# Patient Record
Sex: FEMALE | Race: WHITE | NOT HISPANIC OR LATINO | Employment: FULL TIME | RURAL
[De-identification: names, ages, dates, MRNs, and addresses within clinical notes are randomized per-mention and may not be internally consistent; named-entity substitution may affect disease eponyms.]

---

## 2018-09-10 ENCOUNTER — CONVERSION ENCOUNTER (OUTPATIENT)
Dept: FAMILY MEDICINE CLINIC | Facility: CLINIC | Age: 63
End: 2018-09-10

## 2018-09-11 LAB
ALBUMIN SERPL-MCNC: 4 G/DL (ref 3.6–5.1)
ALP SERPL-CCNC: 137 U/L (ref 33–130)
ALT SERPL-CCNC: 26 U/L (ref 6–29)
AST SERPL-CCNC: 21 U/L (ref 10–35)
BASOPHILS # BLD AUTO: 39 CELLS/UL (ref 0–200)
BASOPHILS NFR BLD AUTO: 0.7 %
BILIRUB SERPL-MCNC: 0.7 MG/DL (ref 0.2–1.2)
BUN SERPL-MCNC: 21 MG/DL (ref 7–25)
BUN/CREAT SERPL: ABNORMAL (CALC) (ref 6–22)
CALCIUM SERPL-MCNC: 10.3 MG/DL (ref 8.6–10.4)
CHLORIDE SERPL-SCNC: 106 MMOL/L (ref 98–110)
CHOLEST SERPL-MCNC: 159 MG/DL
CHOLEST/HDLC SERPL: 3.8 (CALC)
CONV CO2: 25 MMOL/L (ref 20–32)
CONV TOTAL PROTEIN: 6.7 G/DL (ref 6.1–8.1)
CREAT UR-MCNC: 0.92 MG/DL (ref 0.5–0.99)
EOSINOPHIL # BLD AUTO: 151 CELLS/UL (ref 15–500)
EOSINOPHIL # BLD AUTO: 2.7 %
ERYTHROCYTE [DISTWIDTH] IN BLOOD BY AUTOMATED COUNT: 14.4 % (ref 11–15)
GLOBULIN UR ELPH-MCNC: 2.7 MG/DL (ref 1.9–3.7)
GLUCOSE UR QL: 134 MG/DL (ref 65–99)
HCT VFR BLD AUTO: 42.7 % (ref 35–45)
HDLC SERPL-MCNC: 42 MG/DL
HGB BLD-MCNC: 14 G/DL (ref 11.7–15.5)
INSULIN SERPL-ACNC: 1.5 (CALC) (ref 1–2.5)
LDLC SERPL CALC-MCNC: 90 MG/DL
LYMPHOCYTES # BLD AUTO: 1462 CELLS/UL (ref 850–3900)
LYMPHOCYTES NFR BLD AUTO: 26.1 %
MCH RBC QN AUTO: 28.1 PG (ref 27–33)
MCHC RBC AUTO-ENTMCNC: 32.8 G/DL (ref 32–36)
MCV RBC AUTO: 85.6 FL (ref 80–100)
MONOCYTES # BLD AUTO: 381 CELLS/UL (ref 200–950)
MONOCYTES NFR BLD AUTO: 6.8 %
NEUTROPHILS # BLD AUTO: 3567 CELLS/UL (ref 1500–7800)
NEUTROPHILS NFR BLD AUTO: 63.7 %
NONHDLC SERPL-MCNC: 117 MG/DL
PLATELET # BLD AUTO: 204 10*3/UL (ref 140–400)
PMV BLD AUTO: 10.2 FL (ref 7.5–12.5)
POTASSIUM SERPL-SCNC: 4.2 MMOL/L (ref 3.5–5.3)
RBC # BLD AUTO: 4.99 MILLION/UL (ref 3.8–5.1)
SODIUM SERPL-SCNC: 138 MMOL/L (ref 135–146)
TRIGL SERPL-MCNC: 161 MG/DL
TSH SERPL-ACNC: 2.07 MIU/L (ref 0.4–4.5)
WBC # BLD AUTO: 5.6 10*3/UL (ref 3.8–10.8)

## 2019-04-06 ENCOUNTER — CONVERSION ENCOUNTER (OUTPATIENT)
Dept: FAMILY MEDICINE CLINIC | Facility: CLINIC | Age: 64
End: 2019-04-06

## 2019-04-07 LAB
ALBUMIN SERPL-MCNC: 4 G/DL (ref 3.6–5.1)
ALP SERPL-CCNC: 161 U/L (ref 33–130)
ALT SERPL-CCNC: 30 U/L (ref 6–29)
AST SERPL-CCNC: 19 U/L (ref 10–35)
BILIRUB SERPL-MCNC: 0.6 MG/DL (ref 0.2–1.2)
BUN SERPL-MCNC: 21 MG/DL (ref 7–25)
BUN/CREAT SERPL: ABNORMAL (CALC) (ref 6–22)
CALCIUM SERPL-MCNC: 10.2 MG/DL (ref 8.6–10.4)
CHLORIDE SERPL-SCNC: 106 MMOL/L (ref 98–110)
CHOLEST SERPL-MCNC: 146 MG/DL
CHOLEST/HDLC SERPL: 3.7 (CALC)
CONV CO2: 26 MMOL/L (ref 20–32)
CONV TOTAL PROTEIN: 6.4 G/DL (ref 6.1–8.1)
CREAT UR-MCNC: 0.94 MG/DL (ref 0.5–0.99)
GLOBULIN UR ELPH-MCNC: 2.4 MG/DL (ref 1.9–3.7)
GLUCOSE UR QL: 122 MG/DL (ref 65–99)
HBA1C MFR BLD: 5.6 %
HDLC SERPL-MCNC: 39 MG/DL
INSULIN SERPL-ACNC: 1.7 (CALC) (ref 1–2.5)
LDLC SERPL CALC-MCNC: 82 MG/DL
NONHDLC SERPL-MCNC: 107 MG/DL
POTASSIUM SERPL-SCNC: 4.5 MMOL/L (ref 3.5–5.3)
SODIUM SERPL-SCNC: 139 MMOL/L (ref 135–146)
TRIGL SERPL-MCNC: 152 MG/DL

## 2019-06-17 ENCOUNTER — OFFICE VISIT (OUTPATIENT)
Dept: FAMILY MEDICINE CLINIC | Facility: CLINIC | Age: 64
End: 2019-06-17

## 2019-06-17 VITALS
DIASTOLIC BLOOD PRESSURE: 89 MMHG | OXYGEN SATURATION: 99 % | HEART RATE: 69 BPM | WEIGHT: 247 LBS | RESPIRATION RATE: 18 BRPM | HEIGHT: 64 IN | TEMPERATURE: 98.7 F | SYSTOLIC BLOOD PRESSURE: 136 MMHG | BODY MASS INDEX: 42.17 KG/M2

## 2019-06-17 DIAGNOSIS — M54.50 ACUTE LEFT-SIDED LOW BACK PAIN WITHOUT SCIATICA: Primary | ICD-10-CM

## 2019-06-17 DIAGNOSIS — I10 ESSENTIAL HYPERTENSION: ICD-10-CM

## 2019-06-17 DIAGNOSIS — E66.01 MORBID OBESITY (HCC): ICD-10-CM

## 2019-06-17 PROCEDURE — 99213 OFFICE O/P EST LOW 20 MIN: CPT | Performed by: FAMILY MEDICINE

## 2019-06-17 PROCEDURE — 96372 THER/PROPH/DIAG INJ SC/IM: CPT | Performed by: FAMILY MEDICINE

## 2019-06-17 RX ORDER — KETOROLAC TROMETHAMINE 30 MG/ML
60 INJECTION, SOLUTION INTRAMUSCULAR; INTRAVENOUS ONCE
Status: COMPLETED | OUTPATIENT
Start: 2019-06-17 | End: 2019-06-17

## 2019-06-17 RX ORDER — POTASSIUM CHLORIDE 1.5 G/1.77G
POWDER, FOR SOLUTION ORAL
COMMUNITY
Start: 2013-04-19 | End: 2019-10-18

## 2019-06-17 RX ORDER — METHOCARBAMOL 500 MG/1
500 TABLET, FILM COATED ORAL 4 TIMES DAILY PRN
Qty: 30 TABLET | Refills: 2 | Status: SHIPPED | OUTPATIENT
Start: 2019-06-17 | End: 2019-10-18

## 2019-06-17 RX ORDER — PREDNISONE 20 MG/1
20 TABLET ORAL 2 TIMES DAILY
Qty: 10 TABLET | Refills: 0 | Status: SHIPPED | OUTPATIENT
Start: 2019-06-17 | End: 2019-06-22

## 2019-06-17 RX ORDER — RAMIPRIL 10 MG/1
CAPSULE ORAL
COMMUNITY
Start: 2013-04-19 | End: 2019-11-19 | Stop reason: SDUPTHER

## 2019-06-17 RX ORDER — MONTELUKAST SODIUM 10 MG/1
1 TABLET ORAL DAILY
COMMUNITY
Start: 2013-04-19 | End: 2019-11-19 | Stop reason: SDUPTHER

## 2019-06-17 RX ORDER — FUROSEMIDE 20 MG/1
TABLET ORAL
COMMUNITY
Start: 2013-04-19 | End: 2019-11-22

## 2019-06-17 RX ADMIN — KETOROLAC TROMETHAMINE 60 MG: 30 INJECTION, SOLUTION INTRAMUSCULAR; INTRAVENOUS at 14:40

## 2019-06-17 NOTE — PROGRESS NOTES
"Subjective   Brianna Caldera is a 63 y.o. female.     Back Pain   This is a new problem. The current episode started in the past 7 days. The problem occurs constantly. The problem is unchanged. The pain is present in the lumbar spine. The quality of the pain is described as shooting. The pain radiates to the left thigh. The pain is at a severity of 6/10. The pain is moderate. The symptoms are aggravated by sitting, bending, standing and position. Pertinent negatives include no abdominal pain, fever, numbness, pelvic pain, perianal numbness, tingling or weakness. She has tried NSAIDs and heat (alternating Aleve and Tylenol) for the symptoms. The treatment provided mild (temporary relief) relief.   May have started after lifting her 42 lb granddaughter.    The following portions of the patient's history were reviewed and updated as appropriate: allergies, current medications, past family history, past medical history, past social history, past surgical history and problem list.    Review of Systems   Constitutional: Positive for activity change. Negative for fever.   Gastrointestinal: Negative for abdominal pain and constipation.   Genitourinary: Negative.  Negative for decreased urine volume and pelvic pain.   Musculoskeletal: Positive for back pain and gait problem.   Neurological: Negative for dizziness, tingling, weakness and numbness.       Objective    Vitals:    06/17/19 1334   BP: 136/89   BP Location: Left arm   Patient Position: Sitting   Cuff Size: Large Adult   Pulse: 69   Resp: 18   Temp: 98.7 °F (37.1 °C)   TempSrc: Oral   SpO2: 99%   Weight: 112 kg (247 lb)   Height: 162.6 cm (64\")     Physical Exam   HENT:   Head: Normocephalic and atraumatic.   Mouth/Throat: Oropharynx is clear and moist.   Eyes: Conjunctivae and EOM are normal. Pupils are equal, round, and reactive to light.   Neck: Normal range of motion. Neck supple. No edema present.   Cardiovascular: Normal rate, regular rhythm and normal heart " sounds.   Pulmonary/Chest: Effort normal and breath sounds normal.   Abdominal: Soft. Bowel sounds are normal.   Musculoskeletal:        Lumbar back: She exhibits tenderness and spasm. She exhibits no swelling, no edema and no deformity.        Back:    Neurological: She is alert. She has normal strength. No cranial nerve deficit.   Reflex Scores:       Patellar reflexes are 0 on the right side and 0 on the left side.  Bilateral LE strength equal   Skin: No rash noted.   Psychiatric: She has a normal mood and affect. Thought content normal.         Assessment/Plan   Brianna was seen today for back pain.    Diagnoses and all orders for this visit:    Acute left-sided low back pain without sciatica  Comments:  Likely lumbar strain.  Treatment as below.  Warning signs discussed.  F/U in 5-7 days if not improved.  Orders:  -     methocarbamol (ROBAXIN) 500 MG tablet; Take 1 tablet by mouth 4 (Four) Times a Day As Needed for Muscle Spasms.  -     predniSONE (DELTASONE) 20 MG tablet; Take 1 tablet by mouth 2 (Two) Times a Day for 5 days.  -     ketorolac (TORADOL) injection 60 mg    Essential hypertension  Comments:  Stable.  Continue current medication.    Morbid obesity (CMS/HCC)    Other orders  -     Cancel: X-ray lumbar spine 2 or 3 views

## 2019-06-17 NOTE — PATIENT INSTRUCTIONS
Acute Back Pain, Adult  Acute back pain is sudden and usually short-lived. It is often caused by an injury to the muscles and tissues in the back. The injury may result from:  · A muscle or ligament getting overstretched or torn (strained). Ligaments are tissues that connect bones to each other. Lifting something improperly can cause a back strain.  · Wear and tear (degeneration) of the spinal disks. Spinal disks are circular tissue that provides cushioning between the bones of the spine (vertebrae).  · Twisting motions, such as while playing sports or doing yard work.  · A hit to the back.  · Arthritis.    You may have a physical exam, lab tests, and imaging tests to find the cause of your pain. Acute back pain usually goes away with rest and home care.  Follow these instructions at home:  Managing pain, stiffness, and swelling  · Take over-the-counter and prescription medicines only as told by your health care provider.  · Your health care provider may recommend applying ice during the first 24-48 hours after your pain starts. To do this:  ? Put ice in a plastic bag.  ? Place a towel between your skin and the bag.  ? Leave the ice on for 20 minutes, 2-3 times a day.  · If directed, apply heat to the affected area as often as told by your health care provider. Use the heat source that your health care provider recommends, such as a moist heat pack or a heating pad.  ? Place a towel between your skin and the heat source.  ? Leave the heat on for 20-30 minutes.  ? Remove the heat if your skin turns bright red. This is especially important if you are unable to feel pain, heat, or cold. You have a greater risk of getting burned.  Activity  · Do not stay in bed. Staying in bed for more than 1-2 days can delay your recovery.  · Sit up and stand up straight. Avoid leaning forward when you sit, or hunching over when you stand.  ? If you work at a desk, sit close to it so you do not need to lean over. Keep your chin  "tucked in. Keep your neck drawn back, and keep your elbows bent at a right angle. Your arms should look like the letter \"L.\"  ? Sit high and close to the steering wheel when you drive. Add lower back (lumbar) support to your car seat, if needed.  · Take short walks on even surfaces as soon as you are able. Try to increase the length of time you walk each day.  · Do not sit, drive, or  one place for more than 30 minutes at a time. Sitting or standing for long periods of time can put stress on your back.  · Do not drive or use heavy machinery while taking prescription pain medicine.  · Use proper lifting techniques. When you bend and lift, use positions that put less stress on your back:  ? Bend your knees.  ? Keep the load close to your body.  ? Avoid twisting.  · Exercise regularly as told by your health care provider. Exercising helps your back heal faster and helps prevent back injuries by keeping muscles strong and flexible.  · Work with a physical therapist to make a safe exercise program, as recommended by your health care provider. Do any exercises as told by your physical therapist.  Lifestyle  · Maintain a healthy weight. Extra weight puts stress on your back and makes it difficult to have good posture.  · Avoid activities or situations that make you feel anxious or stressed. Stress and anxiety increase muscle tension and can make back pain worse. Learn ways to manage anxiety and stress, such as through exercise.  General instructions  · Sleep on a firm mattress in a comfortable position. Try lying on your side with your knees slightly bent. If you lie on your back, put a pillow under your knees.  · Follow your treatment plan as told by your health care provider. This may include:  ? Cognitive or behavioral therapy.  ? Acupuncture or massage therapy.  ? Meditation or yoga.  Contact a health care provider if:  · You have pain that is not relieved with rest or medicine.  · You have increasing pain " going down into your legs or buttocks.  · Your pain does not improve after 2 weeks.  · You have pain at night.  · You lose weight without trying.  · You have a fever or chills.  Get help right away if:  · You develop new bowel or bladder control problems.  · You have unusual weakness or numbness in your arms or legs.  · You develop nausea or vomiting.  · You develop abdominal pain.  · You feel faint.  Summary  · Acute back pain is sudden and usually short-lived.  · Use proper lifting techniques. When you bend and lift, use positions that put less stress on your back.  · Take over-the-counter and prescription medicines and apply heat or ice as directed by your health care provider.  This information is not intended to replace advice given to you by your health care provider. Make sure you discuss any questions you have with your health care provider.  Document Released: 12/18/2006 Document Revised: 08/01/2018 Document Reviewed: 08/01/2018  [facility] Interactive Patient Education © 2019 [employer]

## 2019-06-17 NOTE — ASSESSMENT & PLAN NOTE
Hypertension is unchanged/stable.  Continue current treatment regimen.  Blood pressure will be reassessed at the next regular appointment.

## 2019-06-26 PROBLEM — E78.49 OTHER HYPERLIPIDEMIA: Status: ACTIVE | Noted: 2019-06-26

## 2019-06-26 PROBLEM — Z12.31 ENCOUNTER FOR SCREENING MAMMOGRAM FOR MALIGNANT NEOPLASM OF BREAST: Status: ACTIVE | Noted: 2019-06-26

## 2019-06-26 PROBLEM — Z13.9 ENCOUNTER FOR SCREENING: Status: ACTIVE | Noted: 2019-06-26

## 2019-06-26 PROBLEM — K21.9 GERD (GASTROESOPHAGEAL REFLUX DISEASE): Status: ACTIVE | Noted: 2019-06-26

## 2019-06-26 PROBLEM — R73.02 GLUCOSE INTOLERANCE (IMPAIRED GLUCOSE TOLERANCE): Status: ACTIVE | Noted: 2019-06-26

## 2019-06-26 PROBLEM — E66.01 MORBID OBESITY: Status: ACTIVE | Noted: 2019-06-26

## 2019-06-26 RX ORDER — POTASSIUM CHLORIDE 750 MG/1
10 TABLET, EXTENDED RELEASE ORAL DAILY PRN
COMMUNITY
Start: 2018-08-28

## 2019-06-26 RX ORDER — OMEPRAZOLE 10 MG/1
10 CAPSULE, DELAYED RELEASE ORAL EVERY EVENING
COMMUNITY
Start: 2018-08-27

## 2019-10-14 DIAGNOSIS — I10 ESSENTIAL HYPERTENSION: Primary | ICD-10-CM

## 2019-10-14 DIAGNOSIS — E78.49 OTHER HYPERLIPIDEMIA: ICD-10-CM

## 2019-10-14 DIAGNOSIS — I10 ESSENTIAL HYPERTENSION: ICD-10-CM

## 2019-10-14 DIAGNOSIS — R73.02 GLUCOSE INTOLERANCE (IMPAIRED GLUCOSE TOLERANCE): ICD-10-CM

## 2019-10-15 LAB
ALBUMIN SERPL-MCNC: 4.7 G/DL (ref 3.6–4.8)
ALBUMIN/GLOB SERPL: 2 {RATIO} (ref 1.2–2.2)
ALP SERPL-CCNC: 155 IU/L (ref 39–117)
ALT SERPL-CCNC: 37 IU/L (ref 0–32)
AST SERPL-CCNC: 24 IU/L (ref 0–40)
BASOPHILS # BLD AUTO: 0 X10E3/UL (ref 0–0.2)
BASOPHILS NFR BLD AUTO: 1 %
BILIRUB SERPL-MCNC: 0.8 MG/DL (ref 0–1.2)
BUN SERPL-MCNC: 16 MG/DL (ref 8–27)
BUN/CREAT SERPL: 23 (ref 12–28)
CALCIUM SERPL-MCNC: 10.9 MG/DL (ref 8.7–10.3)
CHLORIDE SERPL-SCNC: 103 MMOL/L (ref 96–106)
CHOLEST SERPL-MCNC: 175 MG/DL (ref 100–199)
CHOLEST/HDLC SERPL: 3.8 RATIO (ref 0–4.4)
CO2 SERPL-SCNC: 24 MMOL/L (ref 20–29)
CREAT SERPL-MCNC: 0.71 MG/DL (ref 0.57–1)
EOSINOPHIL # BLD AUTO: 0.2 X10E3/UL (ref 0–0.4)
EOSINOPHIL NFR BLD AUTO: 3 %
ERYTHROCYTE [DISTWIDTH] IN BLOOD BY AUTOMATED COUNT: 14.9 % (ref 12.3–15.4)
GLOBULIN SER CALC-MCNC: 2.3 G/DL (ref 1.5–4.5)
GLUCOSE SERPL-MCNC: 88 MG/DL (ref 65–99)
HBA1C MFR BLD: 5.7 % (ref 4.8–5.6)
HCT VFR BLD AUTO: 43.4 % (ref 34–46.6)
HDLC SERPL-MCNC: 46 MG/DL
HGB BLD-MCNC: 14.3 G/DL (ref 11.1–15.9)
IMM GRANULOCYTES # BLD AUTO: 0 X10E3/UL (ref 0–0.1)
IMM GRANULOCYTES NFR BLD AUTO: 0 %
LDLC SERPL CALC-MCNC: 101 MG/DL (ref 0–99)
LYMPHOCYTES # BLD AUTO: 2.4 X10E3/UL (ref 0.7–3.1)
LYMPHOCYTES NFR BLD AUTO: 35 %
MCH RBC QN AUTO: 29.3 PG (ref 26.6–33)
MCHC RBC AUTO-ENTMCNC: 32.9 G/DL (ref 31.5–35.7)
MCV RBC AUTO: 89 FL (ref 79–97)
MONOCYTES # BLD AUTO: 0.4 X10E3/UL (ref 0.1–0.9)
MONOCYTES NFR BLD AUTO: 6 %
NEUTROPHILS # BLD AUTO: 3.9 X10E3/UL (ref 1.4–7)
NEUTROPHILS NFR BLD AUTO: 55 %
PLATELET # BLD AUTO: 244 X10E3/UL (ref 150–450)
POTASSIUM SERPL-SCNC: 4.4 MMOL/L (ref 3.5–5.2)
PROT SERPL-MCNC: 7 G/DL (ref 6–8.5)
RBC # BLD AUTO: 4.88 X10E6/UL (ref 3.77–5.28)
SODIUM SERPL-SCNC: 141 MMOL/L (ref 134–144)
TRIGL SERPL-MCNC: 141 MG/DL (ref 0–149)
VLDLC SERPL CALC-MCNC: 28 MG/DL (ref 5–40)
WBC # BLD AUTO: 6.9 X10E3/UL (ref 3.4–10.8)

## 2019-10-18 ENCOUNTER — OFFICE VISIT (OUTPATIENT)
Dept: FAMILY MEDICINE CLINIC | Facility: CLINIC | Age: 64
End: 2019-10-18

## 2019-10-18 VITALS
DIASTOLIC BLOOD PRESSURE: 84 MMHG | TEMPERATURE: 97.9 F | OXYGEN SATURATION: 98 % | HEART RATE: 87 BPM | HEIGHT: 64 IN | BODY MASS INDEX: 41.38 KG/M2 | SYSTOLIC BLOOD PRESSURE: 128 MMHG | RESPIRATION RATE: 16 BRPM | WEIGHT: 242.4 LBS

## 2019-10-18 DIAGNOSIS — R10.33 PERIUMBILICAL ABDOMINAL PAIN: ICD-10-CM

## 2019-10-18 DIAGNOSIS — R73.02 GLUCOSE INTOLERANCE (IMPAIRED GLUCOSE TOLERANCE): Primary | ICD-10-CM

## 2019-10-18 DIAGNOSIS — Z23 FLU VACCINE NEED: ICD-10-CM

## 2019-10-18 LAB
BILIRUB BLD-MCNC: NEGATIVE MG/DL
CLARITY, POC: CLEAR
COLOR UR: YELLOW
GLUCOSE BLDC GLUCOMTR-MCNC: 83 MG/DL (ref 70–130)
GLUCOSE UR STRIP-MCNC: NEGATIVE MG/DL
KETONES UR QL: NEGATIVE
LEUKOCYTE EST, POC: NEGATIVE
NITRITE UR-MCNC: NEGATIVE MG/ML
PH UR: 6 [PH] (ref 5–8)
POC MICROALBUMIN URINE: 20
PROT UR STRIP-MCNC: NEGATIVE MG/DL
RBC # UR STRIP: NEGATIVE /UL
SP GR UR: 1.01 (ref 1–1.03)
UROBILINOGEN UR QL: NORMAL

## 2019-10-18 PROCEDURE — 90471 IMMUNIZATION ADMIN: CPT | Performed by: FAMILY MEDICINE

## 2019-10-18 PROCEDURE — 81003 URINALYSIS AUTO W/O SCOPE: CPT | Performed by: FAMILY MEDICINE

## 2019-10-18 PROCEDURE — 82962 GLUCOSE BLOOD TEST: CPT | Performed by: FAMILY MEDICINE

## 2019-10-18 PROCEDURE — 82044 UR ALBUMIN SEMIQUANTITATIVE: CPT | Performed by: FAMILY MEDICINE

## 2019-10-18 PROCEDURE — 99214 OFFICE O/P EST MOD 30 MIN: CPT | Performed by: FAMILY MEDICINE

## 2019-10-18 PROCEDURE — 90674 CCIIV4 VAC NO PRSV 0.5 ML IM: CPT | Performed by: FAMILY MEDICINE

## 2019-10-18 NOTE — PROGRESS NOTES
Chief Complaint   Patient presents with   • Diabetes       Subjective   Brianna Caldera is a 64 y.o. female.     Diabetes   She presents for her follow-up diabetic visit. She has type 2 diabetes mellitus. Her disease course has been stable. Pertinent negatives for hypoglycemia include no confusion, dizziness, headaches or sweats. Associated symptoms include blurred vision (has cataracts, being evaluated.). There are no hypoglycemic complications. Symptoms are stable. There are no diabetic complications. Current diabetic treatment includes diet. She is compliant with treatment most of the time.     Abdominal pain   is described as the following:  The onset of the abdominal pain has been gradual and has been occurring in a persistent pattern for months.  Last discussion of this complaint April 2019.  The course has been recurrent. The abdominal pain is described as a moderate. The abdominal pain is described as being located in the epigastrium/umbilicus.   The abdominal pain does not radiate. The symptoms have been associated with abdominal distention.       I have reviewed and updated her medications, medical history and problem list during today's office visit.     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   • Arthralgia 11/21/2013   • Allergic rhinitis 04/19/2013   • Encounter for screening mammogram for malignant neoplasm of breast 06/26/2019   • Fasting hyperglycemia 11/21/2013   • GERD (gastroesophageal reflux disease) 06/26/2019   • Glucose intolerance (impaired glucose tolerance) 06/26/2019   • Morbid obesity (CMS/[facility]) 06/26/2019   • Need for immunization against influenza 01/26/2012   • Other hyperlipidemia 06/26/2019   • Encounter for screening 06/26/2019   • Transient ischemic attack 01/26/2012     Resolved Ambulatory Problems     Diagnosis Date Noted   • No Resolved Ambulatory Problems     Past Medical History:   Diagnosis  "Date   • Candidiasis    • Detached vitreous humor    • Encounter for screening mammogram for malignant neoplasm of breast    • GERD (gastroesophageal reflux disease)    • Glucose intolerance (impaired glucose tolerance)    • Hypertension    • Morbid obesity (CMS/HCC)    • Other hyperlipidemia    • Overweight    • Physical exam, annual    • Screening for depression    • Seasonal allergic rhinitis      Current Outpatient Medications on File Prior to Visit   Medication Sig Dispense Refill   • furosemide (LASIX) 20 MG tablet FUROSEMIDE 20 MG TABS     • montelukast (SINGULAIR) 10 MG tablet Take 1 tablet by mouth Daily.     • omeprazole (PRILOSEC) 10 MG capsule Take  by mouth Daily.     • potassium chloride (K-DUR,KLOR-CON) 10 MEQ CR tablet Take  by mouth.     • ramipril (ALTACE) 10 MG capsule RAMIPRIL 10 MG CAPS       No current facility-administered medications on file prior to visit.          Social History     Tobacco Use   • Smoking status: Never Smoker   Substance Use Topics   • Alcohol use: No     Frequency: Never       Review of Systems   Eyes: Positive for blurred vision (has cataracts, being evaluated.).   Gastrointestinal: Positive for abdominal distention (epigastric), abdominal pain (epigastric) and constipation (a few months ago). Negative for diarrhea, vomiting, GERD and indigestion.   Neurological: Negative for dizziness and confusion.       Objective   Vitals:    10/18/19 1616   BP: 128/84   Pulse: 87   Resp: 16   Temp: 97.9 °F (36.6 °C)   SpO2: 98%   Weight: 110 kg (242 lb 6.4 oz)   Height: 162.6 cm (64\")     Body mass index is 41.61 kg/m².  Physical Exam   Constitutional: She appears well-developed and well-nourished. No distress.   HENT:   Head: Normocephalic and atraumatic.   Mouth/Throat: Oropharynx is clear and moist.   Eyes: Conjunctivae and EOM are normal. Pupils are equal, round, and reactive to light. No scleral icterus.   Neck: Normal range of motion. Neck supple. No JVD present. No edema " present.   Cardiovascular: Normal rate, regular rhythm and normal heart sounds.   Pulmonary/Chest: Effort normal and breath sounds normal.   Abdominal: Soft. Normal appearance and bowel sounds are normal. There is tenderness in the periumbilical area. There is no rigidity, no rebound and no guarding.   Musculoskeletal: She exhibits no edema.   Neurological: She is alert. She has normal strength. No cranial nerve deficit.   Reflex Scores:       Patellar reflexes are 0 on the right side and 0 on the left side.  Bilateral LE strength equal   Skin: Skin is warm. Capillary refill takes less than 2 seconds. No rash noted.   Psychiatric: She has a normal mood and affect. Thought content normal.         Lab Results   Component Value Date    GLU 88 10/14/2019    BUN 16 10/14/2019    CREATININE 0.71 10/14/2019    EGFRIFNONA 92 10/14/2019    EGFRIFAFRI 106 10/14/2019     10/14/2019    K 4.4 10/14/2019     10/14/2019    CALCIUM 10.9 (H) 10/14/2019    ALBUMIN 4.7 10/14/2019    BILITOT 0.8 10/14/2019    ALKPHOS 155 (H) 10/14/2019    AST 24 10/14/2019    ALT 37 (H) 10/14/2019    CHLPL 175 10/14/2019    TRIG 141 10/14/2019    HDL 46 10/14/2019    VLDL 28 10/14/2019     (H) 10/14/2019    WBC 6.9 10/14/2019    RBC 4.88 10/14/2019    HCT 43.4 10/14/2019    MCV 89 10/14/2019    MCH 29.3 10/14/2019        Lab Results   Component Value Date    HGBA1C 5.7 (H) 10/14/2019    HGBA1C 5.6 04/06/2019    HGBA1C 6.2 09/14/2018         Assessment/Plan       Diagnoses and all orders for this visit:    1. Glucose intolerance (impaired glucose tolerance) (Primary)  -     POC Glucose  -     POC Urinalysis Dipstick, Automated  -     POC Microalbumin    2. Flu vaccine need  -     Flucelvax Quad=>4Years (PFS)    3. Periumbilical abdominal pain  -     CT Abdomen Pelvis With Contrast; Future      Blood sugars stable.  Continue current treatment.  Proceed with CT abd/pel to eval for hernia.    Return in about 6 months (around 4/18/2020)  for Recheck - blood sugars and blood pressure.

## 2019-11-01 ENCOUNTER — TELEPHONE (OUTPATIENT)
Dept: FAMILY MEDICINE CLINIC | Facility: CLINIC | Age: 64
End: 2019-11-01

## 2019-11-01 DIAGNOSIS — R10.33 PERIUMBILICAL ABDOMINAL PAIN: ICD-10-CM

## 2019-11-01 DIAGNOSIS — K43.9 ABDOMINAL WALL HERNIA: Primary | ICD-10-CM

## 2019-11-07 ENCOUNTER — OFFICE VISIT (OUTPATIENT)
Dept: SURGERY | Facility: CLINIC | Age: 64
End: 2019-11-07

## 2019-11-07 ENCOUNTER — PREP FOR SURGERY (OUTPATIENT)
Dept: OTHER | Facility: HOSPITAL | Age: 64
End: 2019-11-07

## 2019-11-07 VITALS
DIASTOLIC BLOOD PRESSURE: 82 MMHG | HEART RATE: 69 BPM | HEIGHT: 64 IN | SYSTOLIC BLOOD PRESSURE: 150 MMHG | TEMPERATURE: 98.2 F | OXYGEN SATURATION: 98 % | WEIGHT: 241.4 LBS | BODY MASS INDEX: 41.21 KG/M2

## 2019-11-07 DIAGNOSIS — K43.2 INCISIONAL HERNIA: Primary | ICD-10-CM

## 2019-11-07 DIAGNOSIS — K43.0 INCISIONAL HERNIA WITH OBSTRUCTION BUT NO GANGRENE: Primary | ICD-10-CM

## 2019-11-07 PROCEDURE — 99203 OFFICE O/P NEW LOW 30 MIN: CPT | Performed by: SURGERY

## 2019-11-07 RX ORDER — SODIUM CHLORIDE 9 MG/ML
100 INJECTION, SOLUTION INTRAVENOUS CONTINUOUS
Status: CANCELLED | OUTPATIENT
Start: 2019-11-07

## 2019-11-07 NOTE — PROGRESS NOTES
Subjective   Brianna Caldera is a 64 y.o. female.     History of present illness  Ms. Caldera is seen in the office today at the request of Dr. Mohr for an abdominal wall hernia.  She had an exploratory laparotomy for small bowel intussusception and resection a year or so ago and has developed a midline incisional hernia above the umbilicus between the umbilicus and the xiphoid.  CT shows wide separation of the muscles so she is going to need component release bilaterally to be able to reconstruct this with mesh.  The office we discussed that with her and drawn her some pictures.  She understands and agrees to proceed.  She also understands she is at increased risk for infection with mesh and that will need to leave drain tubes both intraperitoneal and in the subcu layer.    Past Medical History:   Diagnosis Date   • Candidiasis    • Detached vitreous humor     Comments: right   • Encounter for screening mammogram for malignant neoplasm of breast    • GERD (gastroesophageal reflux disease)     Impression: Continue OTC medications.   • Glucose intolerance (impaired glucose tolerance)     Impression: With very mild elevation in triglycerides. Improved with diet and weight loss. Continue dietary modifications discussed. No medications needed at this time. Follow-up 6 mo.   • Hypertension     Impression: Stable.   • Morbid obesity (CMS/HCC)     >40   • Other hyperlipidemia     Impression: I recommended dietary modifications. Try Mediterranean diet. Follow-up 6 mo with fasting labs prior to visit.   • Overweight     >25   • Physical exam, annual     Impression: Questions and concerns addressed. Pap smear no longer needed. Colon cancer screening current. Mammogram ordered. Fasting labs ordered. Follow-up yearly or as needed.   • Screening for depression     Negative Depression Screening (4 or less) ()   • Seasonal allergic rhinitis        Past Surgical History:   Procedure Laterality Date   •  SECTION       two   • EXPLORATORY LAPAROTOMY  2017    for small bowel obstruction/intussusception   • LAPAROSCOPIC CHOLECYSTECTOMY  2001   • TONSILLECTOMY  1967   • TOTAL ABDOMINAL HYSTERECTOMY WITH SALPINGO OOPHORECTOMY  2003   • WRIST FRACTURE SURGERY      s/p pin placement in 1990 and repair (bone shortening) in 2016 - [facility]       Outpatient Encounter Medications as of 11/7/2019   Medication Sig Dispense Refill   • furosemide (LASIX) 20 MG tablet FUROSEMIDE 20 MG TABS     • montelukast (SINGULAIR) 10 MG tablet Take 1 tablet by mouth Daily.     • omeprazole (PRILOSEC) 10 MG capsule Take  by mouth Daily.     • potassium chloride (K-DUR,KLOR-CON) 10 MEQ CR tablet Take  by mouth.     • ramipril (ALTACE) 10 MG capsule RAMIPRIL 10 MG CAPS       No facility-administered encounter medications on file as of 11/7/2019.        No Known Allergies    Family History   Problem Relation Age of Onset   • Colon cancer Mother    • Kidney cancer Father    • Brain cancer Brother    • Breast cancer Maternal Grandmother    • Cancer Maternal Grandfather         Bladder       Social History     Socioeconomic History   • Marital status:      Spouse name: Not on file   • Number of children: Not on file   • Years of education: Not on file   • Highest education level: Not on file   Tobacco Use   • Smoking status: Never Smoker   • Smokeless tobacco: Never Used   Substance and Sexual Activity   • Alcohol use: No     Frequency: Never   • Drug use: No   Lifestyle   • Physical activity:     Days per week: 3 days     Minutes per session: 30 min   • Stress: Not at all       The following portions of the patient's history were reviewed and updated as appropriate: allergies, current medications, past family history, past medical history, past social history, past surgical history and problem list.    Objective       Assessment/Plan   There are no diagnoses linked to this encounter.    Complete review of systems is done and unremarkable with  exception of the chief complaint.    School exam shows a pleasant 64-year-old female.  HEENT is negative.  Heart regular.  Lungs are clear.  Abdomen is soft.  She has wide separation of the muscles in the midline as noted above.  Extremities show equal range of motion in the upper and lower extremities.  She has symmetrical strength and usage.  Neuro shows no obvious focal deficit.    Impression: Incisional hernia with wide separation of the muscle.    Recommendation open incisional hernia repair with bilateral component release and mesh placement           Tomas Alamo DO  11/7/2019  9:29 AM

## 2019-11-07 NOTE — H&P
Subjective   Brianna Caldera is a 64 y.o. female.     History of present illness  Ms. Caldera is seen in the office today at the request of Dr. Mohr for an abdominal wall hernia.  She had an exploratory laparotomy for small bowel intussusception and resection a year or so ago and has developed a midline incisional hernia above the umbilicus between the umbilicus and the xiphoid.  CT shows wide separation of the muscles so she is going to need component release bilaterally to be able to reconstruct this with mesh.  The office we discussed that with her and drawn her some pictures.  She understands and agrees to proceed.  She also understands she is at increased risk for infection with mesh and that will need to leave drain tubes both intraperitoneal and in the subcu layer.    Past Medical History:   Diagnosis Date   • Candidiasis    • Detached vitreous humor     Comments: right   • Encounter for screening mammogram for malignant neoplasm of breast    • GERD (gastroesophageal reflux disease)     Impression: Continue OTC medications.   • Glucose intolerance (impaired glucose tolerance)     Impression: With very mild elevation in triglycerides. Improved with diet and weight loss. Continue dietary modifications discussed. No medications needed at this time. Follow-up 6 mo.   • Hypertension     Impression: Stable.   • Morbid obesity (CMS/HCC)     >40   • Other hyperlipidemia     Impression: I recommended dietary modifications. Try Mediterranean diet. Follow-up 6 mo with fasting labs prior to visit.   • Overweight     >25   • Physical exam, annual     Impression: Questions and concerns addressed. Pap smear no longer needed. Colon cancer screening current. Mammogram ordered. Fasting labs ordered. Follow-up yearly or as needed.   • Screening for depression     Negative Depression Screening (4 or less) ()   • Seasonal allergic rhinitis        Past Surgical History:   Procedure Laterality Date   •  SECTION       two   • EXPLORATORY LAPAROTOMY  2017    for small bowel obstruction/intussusception   • LAPAROSCOPIC CHOLECYSTECTOMY  2001   • TONSILLECTOMY  1967   • TOTAL ABDOMINAL HYSTERECTOMY WITH SALPINGO OOPHORECTOMY  2003   • WRIST FRACTURE SURGERY      s/p pin placement in 1990 and repair (bone shortening) in 2016 - [facility]       Outpatient Encounter Medications as of 11/7/2019   Medication Sig Dispense Refill   • furosemide (LASIX) 20 MG tablet FUROSEMIDE 20 MG TABS     • montelukast (SINGULAIR) 10 MG tablet Take 1 tablet by mouth Daily.     • omeprazole (PRILOSEC) 10 MG capsule Take  by mouth Daily.     • potassium chloride (K-DUR,KLOR-CON) 10 MEQ CR tablet Take  by mouth.     • ramipril (ALTACE) 10 MG capsule RAMIPRIL 10 MG CAPS       No facility-administered encounter medications on file as of 11/7/2019.        No Known Allergies    Family History   Problem Relation Age of Onset   • Colon cancer Mother    • Kidney cancer Father    • Brain cancer Brother    • Breast cancer Maternal Grandmother    • Cancer Maternal Grandfather         Bladder       Social History     Socioeconomic History   • Marital status:      Spouse name: Not on file   • Number of children: Not on file   • Years of education: Not on file   • Highest education level: Not on file   Tobacco Use   • Smoking status: Never Smoker   • Smokeless tobacco: Never Used   Substance and Sexual Activity   • Alcohol use: No     Frequency: Never   • Drug use: No   Lifestyle   • Physical activity:     Days per week: 3 days     Minutes per session: 30 min   • Stress: Not at all       The following portions of the patient's history were reviewed and updated as appropriate: allergies, current medications, past family history, past medical history, past social history, past surgical history and problem list.    Objective       Assessment/Plan   There are no diagnoses linked to this encounter.    Complete review of systems is done and unremarkable with  exception of the chief complaint.    School exam shows a pleasant 64-year-old female.  HEENT is negative.  Heart regular.  Lungs are clear.  Abdomen is soft.  She has wide separation of the muscles in the midline as noted above.  Extremities show equal range of motion in the upper and lower extremities.  She has symmetrical strength and usage.  Neuro shows no obvious focal deficit.    Impression: Incisional hernia with wide separation of the muscle.    Recommendation open incisional hernia repair with bilateral component release and mesh placement           Tomas Alamo DO  11/7/2019  9:31 AM

## 2019-11-19 RX ORDER — RAMIPRIL 10 MG/1
CAPSULE ORAL
Qty: 180 CAPSULE | Refills: 1 | Status: SHIPPED | OUTPATIENT
Start: 2019-11-19 | End: 2019-11-22

## 2019-11-19 RX ORDER — MONTELUKAST SODIUM 10 MG/1
TABLET ORAL
Qty: 90 TABLET | Refills: 1 | Status: SHIPPED | OUTPATIENT
Start: 2019-11-19 | End: 2020-05-28

## 2019-11-22 ENCOUNTER — HOSPITAL ENCOUNTER (OUTPATIENT)
Dept: GENERAL RADIOLOGY | Facility: HOSPITAL | Age: 64
Discharge: HOME | End: 2019-11-22
Admitting: SURGERY

## 2019-11-22 ENCOUNTER — APPOINTMENT (OUTPATIENT)
Dept: PREADMISSION TESTING | Facility: HOSPITAL | Age: 64
End: 2019-11-22

## 2019-11-22 VITALS
HEART RATE: 79 BPM | HEIGHT: 64 IN | SYSTOLIC BLOOD PRESSURE: 99 MMHG | OXYGEN SATURATION: 97 % | BODY MASS INDEX: 41.19 KG/M2 | WEIGHT: 241.25 LBS | DIASTOLIC BLOOD PRESSURE: 47 MMHG

## 2019-11-22 DIAGNOSIS — K43.2 INCISIONAL HERNIA: ICD-10-CM

## 2019-11-22 LAB
ABO GROUP BLD: NORMAL
ALBUMIN SERPL-MCNC: 4.1 G/DL (ref 3.5–5.2)
ALBUMIN/GLOB SERPL: 1.3 G/DL
ALP SERPL-CCNC: 126 U/L (ref 39–117)
ALT SERPL W P-5'-P-CCNC: 18 U/L (ref 1–33)
ANION GAP SERPL CALCULATED.3IONS-SCNC: 11 MMOL/L (ref 5–15)
APTT PPP: 25.3 SECONDS (ref 24–31)
AST SERPL-CCNC: 14 U/L (ref 1–32)
BASOPHILS # BLD AUTO: 0 10*3/MM3 (ref 0–0.2)
BASOPHILS NFR BLD AUTO: 0.7 % (ref 0–1.5)
BILIRUB SERPL-MCNC: 0.5 MG/DL (ref 0.2–1.2)
BLD GP AB SCN SERPL QL: NEGATIVE
BUN BLD-MCNC: 16 MG/DL (ref 8–23)
BUN/CREAT SERPL: 15 (ref 7–25)
CALCIUM SPEC-SCNC: 10.7 MG/DL (ref 8.6–10.5)
CHLORIDE SERPL-SCNC: 104 MMOL/L (ref 98–107)
CO2 SERPL-SCNC: 27 MMOL/L (ref 22–29)
CREAT BLD-MCNC: 1.07 MG/DL (ref 0.57–1)
DEPRECATED RDW RBC AUTO: 45.1 FL (ref 37–54)
EOSINOPHIL # BLD AUTO: 0.1 10*3/MM3 (ref 0–0.4)
EOSINOPHIL NFR BLD AUTO: 1.5 % (ref 0.3–6.2)
ERYTHROCYTE [DISTWIDTH] IN BLOOD BY AUTOMATED COUNT: 14.5 % (ref 12.3–15.4)
GFR SERPL CREATININE-BSD FRML MDRD: 52 ML/MIN/1.73
GLOBULIN UR ELPH-MCNC: 3.1 GM/DL
GLUCOSE BLD-MCNC: 117 MG/DL (ref 65–99)
HCT VFR BLD AUTO: 40 % (ref 34–46.6)
HGB BLD-MCNC: 13.8 G/DL (ref 12–15.9)
INR PPP: 0.96 (ref 0.9–1.1)
LYMPHOCYTES # BLD AUTO: 1.4 10*3/MM3 (ref 0.7–3.1)
LYMPHOCYTES NFR BLD AUTO: 24.8 % (ref 19.6–45.3)
MCH RBC QN AUTO: 30.1 PG (ref 26.6–33)
MCHC RBC AUTO-ENTMCNC: 34.4 G/DL (ref 31.5–35.7)
MCV RBC AUTO: 87.6 FL (ref 79–97)
MONOCYTES # BLD AUTO: 0.4 10*3/MM3 (ref 0.1–0.9)
MONOCYTES NFR BLD AUTO: 7 % (ref 5–12)
NEUTROPHILS # BLD AUTO: 3.8 10*3/MM3 (ref 1.7–7)
NEUTROPHILS NFR BLD AUTO: 66 % (ref 42.7–76)
NRBC BLD AUTO-RTO: 0.1 /100 WBC (ref 0–0.2)
PLATELET # BLD AUTO: 193 10*3/MM3 (ref 140–450)
PMV BLD AUTO: 7.7 FL (ref 6–12)
POTASSIUM BLD-SCNC: 3.7 MMOL/L (ref 3.5–5.2)
PROT SERPL-MCNC: 7.2 G/DL (ref 6–8.5)
PROTHROMBIN TIME: 10.1 SECONDS (ref 9.6–11.7)
RBC # BLD AUTO: 4.57 10*6/MM3 (ref 3.77–5.28)
RH BLD: NEGATIVE
SODIUM BLD-SCNC: 142 MMOL/L (ref 136–145)
T&S EXPIRATION DATE: NORMAL
WBC NRBC COR # BLD: 5.8 10*3/MM3 (ref 3.4–10.8)

## 2019-11-22 PROCEDURE — 93005 ELECTROCARDIOGRAM TRACING: CPT

## 2019-11-22 PROCEDURE — 86850 RBC ANTIBODY SCREEN: CPT | Performed by: SURGERY

## 2019-11-22 PROCEDURE — 80053 COMPREHEN METABOLIC PANEL: CPT | Performed by: SURGERY

## 2019-11-22 PROCEDURE — 36415 COLL VENOUS BLD VENIPUNCTURE: CPT

## 2019-11-22 PROCEDURE — 86900 BLOOD TYPING SEROLOGIC ABO: CPT

## 2019-11-22 PROCEDURE — 71046 X-RAY EXAM CHEST 2 VIEWS: CPT

## 2019-11-22 PROCEDURE — 93010 ELECTROCARDIOGRAM REPORT: CPT | Performed by: INTERNAL MEDICINE

## 2019-11-22 PROCEDURE — 87081 CULTURE SCREEN ONLY: CPT | Performed by: SURGERY

## 2019-11-22 PROCEDURE — 86900 BLOOD TYPING SEROLOGIC ABO: CPT | Performed by: SURGERY

## 2019-11-22 PROCEDURE — 86901 BLOOD TYPING SEROLOGIC RH(D): CPT | Performed by: SURGERY

## 2019-11-22 PROCEDURE — 85610 PROTHROMBIN TIME: CPT | Performed by: SURGERY

## 2019-11-22 PROCEDURE — 85730 THROMBOPLASTIN TIME PARTIAL: CPT | Performed by: SURGERY

## 2019-11-22 PROCEDURE — 86901 BLOOD TYPING SEROLOGIC RH(D): CPT

## 2019-11-22 PROCEDURE — 85025 COMPLETE CBC W/AUTO DIFF WBC: CPT | Performed by: SURGERY

## 2019-11-22 RX ORDER — RAMIPRIL 10 MG/1
20 CAPSULE ORAL EVERY MORNING
COMMUNITY
End: 2020-05-28

## 2019-11-22 RX ORDER — FUROSEMIDE 20 MG/1
20 TABLET ORAL DAILY PRN
COMMUNITY
End: 2020-07-02 | Stop reason: SDUPTHER

## 2019-11-23 LAB — MRSA SPEC QL CULT: NORMAL

## 2019-12-02 ENCOUNTER — HOSPITAL ENCOUNTER (INPATIENT)
Facility: HOSPITAL | Age: 64
LOS: 4 days | Discharge: HOME | End: 2019-12-06
Attending: SURGERY | Admitting: SURGERY

## 2019-12-02 ENCOUNTER — ANESTHESIA (OUTPATIENT)
Dept: PERIOP | Facility: HOSPITAL | Age: 64
End: 2019-12-02

## 2019-12-02 ENCOUNTER — ANESTHESIA EVENT (OUTPATIENT)
Dept: PERIOP | Facility: HOSPITAL | Age: 64
End: 2019-12-02

## 2019-12-02 DIAGNOSIS — K43.2 INCISIONAL HERNIA: ICD-10-CM

## 2019-12-02 PROCEDURE — C1781 MESH (IMPLANTABLE): HCPCS | Performed by: SURGERY

## 2019-12-02 PROCEDURE — 25010000002 PROPOFOL 200 MG/20ML EMULSION: Performed by: ANESTHESIOLOGIST ASSISTANT

## 2019-12-02 PROCEDURE — 49568 PR IMPLANT MESH HERNIA REPAIR/DEBRIDEMENT CLOSURE: CPT | Performed by: SURGERY

## 2019-12-02 PROCEDURE — 25010000002 DEXAMETHASONE PER 1 MG: Performed by: ANESTHESIOLOGIST ASSISTANT

## 2019-12-02 PROCEDURE — 15734 MUSCLE-SKIN GRAFT TRUNK: CPT | Performed by: SURGERY

## 2019-12-02 PROCEDURE — 25010000003 CEFAZOLIN PER 500 MG: Performed by: SURGERY

## 2019-12-02 PROCEDURE — 25010000002 HYDROMORPHONE PER 4 MG: Performed by: SURGERY

## 2019-12-02 PROCEDURE — 25010000002 FENTANYL CITRATE (PF) 100 MCG/2ML SOLUTION: Performed by: ANESTHESIOLOGIST ASSISTANT

## 2019-12-02 PROCEDURE — 49560 PR REPAIR INCISIONAL HERNIA,REDUCIBLE: CPT | Performed by: REGISTERED NURSE

## 2019-12-02 PROCEDURE — 99222 1ST HOSP IP/OBS MODERATE 55: CPT | Performed by: HOSPITALIST

## 2019-12-02 PROCEDURE — 25010000002 DIPHENHYDRAMINE PER 50 MG: Performed by: ANESTHESIOLOGIST ASSISTANT

## 2019-12-02 PROCEDURE — 49560 PR REPAIR INCISIONAL HERNIA,REDUCIBLE: CPT | Performed by: SURGERY

## 2019-12-02 PROCEDURE — 25010000002 MIDAZOLAM PER 1 MG: Performed by: ANESTHESIOLOGIST ASSISTANT

## 2019-12-02 PROCEDURE — 15734 MUSCLE-SKIN GRAFT TRUNK: CPT | Performed by: REGISTERED NURSE

## 2019-12-02 PROCEDURE — 49568 PR IMPLANT MESH HERNIA REPAIR/DEBRIDEMENT CLOSURE: CPT | Performed by: REGISTERED NURSE

## 2019-12-02 PROCEDURE — 0WQF0ZZ REPAIR ABDOMINAL WALL, OPEN APPROACH: ICD-10-PCS | Performed by: SURGERY

## 2019-12-02 PROCEDURE — 25010000002 ONDANSETRON PER 1 MG: Performed by: ANESTHESIOLOGIST ASSISTANT

## 2019-12-02 PROCEDURE — 88302 TISSUE EXAM BY PATHOLOGIST: CPT | Performed by: SURGERY

## 2019-12-02 PROCEDURE — 25010000002 HYDROMORPHONE PER 4 MG: Performed by: ANESTHESIOLOGIST ASSISTANT

## 2019-12-02 DEVICE — BARD COMPOSIX L/P MESH
Type: IMPLANTABLE DEVICE | Site: ABDOMEN | Status: FUNCTIONAL
Brand: BARD COMPOSIX L/P MESH

## 2019-12-02 DEVICE — BARD MESH
Type: IMPLANTABLE DEVICE | Site: ABDOMEN | Status: FUNCTIONAL
Brand: BARD MESH

## 2019-12-02 RX ORDER — FLUMAZENIL 0.1 MG/ML
0.2 INJECTION INTRAVENOUS AS NEEDED
Status: DISCONTINUED | OUTPATIENT
Start: 2019-12-02 | End: 2019-12-02 | Stop reason: HOSPADM

## 2019-12-02 RX ORDER — PHENYLEPHRINE HCL IN 0.9% NACL 0.5 MG/5ML
SYRINGE (ML) INTRAVENOUS AS NEEDED
Status: DISCONTINUED | OUTPATIENT
Start: 2019-12-02 | End: 2019-12-02 | Stop reason: SURG

## 2019-12-02 RX ORDER — NALOXONE HCL 0.4 MG/ML
0.4 VIAL (ML) INJECTION
Status: DISCONTINUED | OUTPATIENT
Start: 2019-12-02 | End: 2019-12-06 | Stop reason: HOSPADM

## 2019-12-02 RX ORDER — NALOXONE HCL 0.4 MG/ML
0.1 VIAL (ML) INJECTION
Status: DISCONTINUED | OUTPATIENT
Start: 2019-12-02 | End: 2019-12-06 | Stop reason: HOSPADM

## 2019-12-02 RX ORDER — GLYCOPYRROLATE 0.2 MG/ML
INJECTION INTRAMUSCULAR; INTRAVENOUS AS NEEDED
Status: DISCONTINUED | OUTPATIENT
Start: 2019-12-02 | End: 2019-12-02 | Stop reason: SURG

## 2019-12-02 RX ORDER — SODIUM CHLORIDE 9 MG/ML
100 INJECTION, SOLUTION INTRAVENOUS CONTINUOUS
Status: DISCONTINUED | OUTPATIENT
Start: 2019-12-02 | End: 2019-12-06 | Stop reason: HOSPADM

## 2019-12-02 RX ORDER — MORPHINE SULFATE 4 MG/ML
4 INJECTION, SOLUTION INTRAMUSCULAR; INTRAVENOUS
Status: DISCONTINUED | OUTPATIENT
Start: 2019-12-02 | End: 2019-12-06 | Stop reason: HOSPADM

## 2019-12-02 RX ORDER — ONDANSETRON 2 MG/ML
4 INJECTION INTRAMUSCULAR; INTRAVENOUS ONCE AS NEEDED
Status: DISCONTINUED | OUTPATIENT
Start: 2019-12-02 | End: 2019-12-02 | Stop reason: HOSPADM

## 2019-12-02 RX ORDER — LABETALOL HYDROCHLORIDE 5 MG/ML
5 INJECTION, SOLUTION INTRAVENOUS
Status: DISCONTINUED | OUTPATIENT
Start: 2019-12-02 | End: 2019-12-02 | Stop reason: HOSPADM

## 2019-12-02 RX ORDER — PHENYLEPHRINE HCL IN 0.9% NACL 0.5 MG/5ML
.5-3 SYRINGE (ML) INTRAVENOUS
Status: DISCONTINUED | OUTPATIENT
Start: 2019-12-02 | End: 2019-12-02 | Stop reason: HOSPADM

## 2019-12-02 RX ORDER — ONDANSETRON 2 MG/ML
4 INJECTION INTRAMUSCULAR; INTRAVENOUS EVERY 6 HOURS PRN
Status: DISCONTINUED | OUTPATIENT
Start: 2019-12-02 | End: 2019-12-06 | Stop reason: HOSPADM

## 2019-12-02 RX ORDER — HYDRALAZINE HYDROCHLORIDE 20 MG/ML
5 INJECTION INTRAMUSCULAR; INTRAVENOUS
Status: DISCONTINUED | OUTPATIENT
Start: 2019-12-02 | End: 2019-12-02 | Stop reason: HOSPADM

## 2019-12-02 RX ORDER — NALOXONE HCL 0.4 MG/ML
0.4 VIAL (ML) INJECTION AS NEEDED
Status: DISCONTINUED | OUTPATIENT
Start: 2019-12-02 | End: 2019-12-02 | Stop reason: HOSPADM

## 2019-12-02 RX ORDER — HYDROMORPHONE HCL 110MG/55ML
0.25 PATIENT CONTROLLED ANALGESIA SYRINGE INTRAVENOUS
Status: DISCONTINUED | OUTPATIENT
Start: 2019-12-02 | End: 2019-12-02 | Stop reason: HOSPADM

## 2019-12-02 RX ORDER — MIDAZOLAM HYDROCHLORIDE 1 MG/ML
1 INJECTION INTRAMUSCULAR; INTRAVENOUS
Status: DISCONTINUED | OUTPATIENT
Start: 2019-12-02 | End: 2019-12-02 | Stop reason: HOSPADM

## 2019-12-02 RX ORDER — RAMIPRIL 5 MG/1
20 CAPSULE ORAL EVERY MORNING
Status: DISCONTINUED | OUTPATIENT
Start: 2019-12-02 | End: 2019-12-06 | Stop reason: HOSPADM

## 2019-12-02 RX ORDER — PANTOPRAZOLE SODIUM 40 MG/1
40 TABLET, DELAYED RELEASE ORAL
Status: DISCONTINUED | OUTPATIENT
Start: 2019-12-03 | End: 2019-12-06 | Stop reason: HOSPADM

## 2019-12-02 RX ORDER — PROMETHAZINE HYDROCHLORIDE 25 MG/ML
6.25 INJECTION, SOLUTION INTRAMUSCULAR; INTRAVENOUS ONCE AS NEEDED
Status: DISCONTINUED | OUTPATIENT
Start: 2019-12-02 | End: 2019-12-02 | Stop reason: HOSPADM

## 2019-12-02 RX ORDER — PROMETHAZINE HYDROCHLORIDE 25 MG/1
25 TABLET ORAL ONCE AS NEEDED
Status: DISCONTINUED | OUTPATIENT
Start: 2019-12-02 | End: 2019-12-02 | Stop reason: HOSPADM

## 2019-12-02 RX ORDER — DIPHENHYDRAMINE HYDROCHLORIDE 50 MG/ML
INJECTION INTRAMUSCULAR; INTRAVENOUS AS NEEDED
Status: DISCONTINUED | OUTPATIENT
Start: 2019-12-02 | End: 2019-12-02 | Stop reason: SURG

## 2019-12-02 RX ORDER — PROMETHAZINE HYDROCHLORIDE 25 MG/1
25 SUPPOSITORY RECTAL ONCE AS NEEDED
Status: DISCONTINUED | OUTPATIENT
Start: 2019-12-02 | End: 2019-12-02 | Stop reason: HOSPADM

## 2019-12-02 RX ORDER — LABETALOL HYDROCHLORIDE 5 MG/ML
INJECTION, SOLUTION INTRAVENOUS AS NEEDED
Status: DISCONTINUED | OUTPATIENT
Start: 2019-12-02 | End: 2019-12-02 | Stop reason: SURG

## 2019-12-02 RX ORDER — DIPHENHYDRAMINE HYDROCHLORIDE 50 MG/ML
12.5 INJECTION INTRAMUSCULAR; INTRAVENOUS
Status: DISCONTINUED | OUTPATIENT
Start: 2019-12-02 | End: 2019-12-02 | Stop reason: HOSPADM

## 2019-12-02 RX ORDER — ONDANSETRON 4 MG/1
4 TABLET, FILM COATED ORAL EVERY 6 HOURS PRN
Status: DISCONTINUED | OUTPATIENT
Start: 2019-12-02 | End: 2019-12-06 | Stop reason: HOSPADM

## 2019-12-02 RX ORDER — FENTANYL CITRATE 50 UG/ML
INJECTION, SOLUTION INTRAMUSCULAR; INTRAVENOUS AS NEEDED
Status: DISCONTINUED | OUTPATIENT
Start: 2019-12-02 | End: 2019-12-02 | Stop reason: SURG

## 2019-12-02 RX ORDER — LIDOCAINE HYDROCHLORIDE 10 MG/ML
INJECTION, SOLUTION EPIDURAL; INFILTRATION; INTRACAUDAL; PERINEURAL AS NEEDED
Status: DISCONTINUED | OUTPATIENT
Start: 2019-12-02 | End: 2019-12-02 | Stop reason: SURG

## 2019-12-02 RX ORDER — MONTELUKAST SODIUM 10 MG/1
10 TABLET ORAL DAILY
Status: DISCONTINUED | OUTPATIENT
Start: 2019-12-02 | End: 2019-12-06 | Stop reason: HOSPADM

## 2019-12-02 RX ORDER — FENTANYL CITRATE 50 UG/ML
25 INJECTION, SOLUTION INTRAMUSCULAR; INTRAVENOUS
Status: DISCONTINUED | OUTPATIENT
Start: 2019-12-02 | End: 2019-12-02 | Stop reason: HOSPADM

## 2019-12-02 RX ORDER — HYDROCODONE BITARTRATE AND ACETAMINOPHEN 7.5; 325 MG/1; MG/1
1 TABLET ORAL EVERY 4 HOURS PRN
Status: DISCONTINUED | OUTPATIENT
Start: 2019-12-02 | End: 2019-12-06 | Stop reason: HOSPADM

## 2019-12-02 RX ORDER — ROCURONIUM BROMIDE 10 MG/ML
INJECTION, SOLUTION INTRAVENOUS AS NEEDED
Status: DISCONTINUED | OUTPATIENT
Start: 2019-12-02 | End: 2019-12-02 | Stop reason: SURG

## 2019-12-02 RX ORDER — SODIUM CHLORIDE, SODIUM LACTATE, POTASSIUM CHLORIDE, CALCIUM CHLORIDE 600; 310; 30; 20 MG/100ML; MG/100ML; MG/100ML; MG/100ML
INJECTION, SOLUTION INTRAVENOUS CONTINUOUS PRN
Status: DISCONTINUED | OUTPATIENT
Start: 2019-12-02 | End: 2019-12-02 | Stop reason: SURG

## 2019-12-02 RX ORDER — PROPOFOL 10 MG/ML
INJECTION, EMULSION INTRAVENOUS AS NEEDED
Status: DISCONTINUED | OUTPATIENT
Start: 2019-12-02 | End: 2019-12-02 | Stop reason: SURG

## 2019-12-02 RX ORDER — OXYCODONE HYDROCHLORIDE 5 MG/1
10 TABLET ORAL EVERY 4 HOURS PRN
Status: DISCONTINUED | OUTPATIENT
Start: 2019-12-02 | End: 2019-12-06 | Stop reason: HOSPADM

## 2019-12-02 RX ORDER — HYDROMORPHONE HCL 110MG/55ML
PATIENT CONTROLLED ANALGESIA SYRINGE INTRAVENOUS AS NEEDED
Status: DISCONTINUED | OUTPATIENT
Start: 2019-12-02 | End: 2019-12-02 | Stop reason: SURG

## 2019-12-02 RX ORDER — DEXAMETHASONE SODIUM PHOSPHATE 4 MG/ML
INJECTION, SOLUTION INTRA-ARTICULAR; INTRALESIONAL; INTRAMUSCULAR; INTRAVENOUS; SOFT TISSUE AS NEEDED
Status: DISCONTINUED | OUTPATIENT
Start: 2019-12-02 | End: 2019-12-02 | Stop reason: SURG

## 2019-12-02 RX ORDER — ALUMINA, MAGNESIA, AND SIMETHICONE 2400; 2400; 240 MG/30ML; MG/30ML; MG/30ML
15 SUSPENSION ORAL EVERY 4 HOURS PRN
Status: DISCONTINUED | OUTPATIENT
Start: 2019-12-02 | End: 2019-12-06 | Stop reason: HOSPADM

## 2019-12-02 RX ORDER — MIDAZOLAM HYDROCHLORIDE 1 MG/ML
INJECTION INTRAMUSCULAR; INTRAVENOUS AS NEEDED
Status: DISCONTINUED | OUTPATIENT
Start: 2019-12-02 | End: 2019-12-02 | Stop reason: SURG

## 2019-12-02 RX ORDER — HYDROMORPHONE HCL 110MG/55ML
0.5 PATIENT CONTROLLED ANALGESIA SYRINGE INTRAVENOUS
Status: DISCONTINUED | OUTPATIENT
Start: 2019-12-02 | End: 2019-12-06 | Stop reason: HOSPADM

## 2019-12-02 RX ORDER — IPRATROPIUM BROMIDE AND ALBUTEROL SULFATE 2.5; .5 MG/3ML; MG/3ML
3 SOLUTION RESPIRATORY (INHALATION) ONCE AS NEEDED
Status: DISCONTINUED | OUTPATIENT
Start: 2019-12-02 | End: 2019-12-02 | Stop reason: HOSPADM

## 2019-12-02 RX ORDER — PROMETHAZINE HYDROCHLORIDE 25 MG/ML
12.5 INJECTION, SOLUTION INTRAMUSCULAR; INTRAVENOUS EVERY 6 HOURS PRN
Status: DISCONTINUED | OUTPATIENT
Start: 2019-12-02 | End: 2019-12-06 | Stop reason: HOSPADM

## 2019-12-02 RX ORDER — NEOSTIGMINE METHYLSULFATE 5 MG/5 ML
SYRINGE (ML) INTRAVENOUS AS NEEDED
Status: DISCONTINUED | OUTPATIENT
Start: 2019-12-02 | End: 2019-12-02 | Stop reason: SURG

## 2019-12-02 RX ORDER — EPHEDRINE SULFATE 50 MG/ML
5 INJECTION, SOLUTION INTRAVENOUS ONCE AS NEEDED
Status: DISCONTINUED | OUTPATIENT
Start: 2019-12-02 | End: 2019-12-02 | Stop reason: HOSPADM

## 2019-12-02 RX ADMIN — HYDROMORPHONE HYDROCHLORIDE 0.5 MG: 2 INJECTION, SOLUTION INTRAMUSCULAR; INTRAVENOUS; SUBCUTANEOUS at 15:04

## 2019-12-02 RX ADMIN — DEXAMETHASONE SODIUM PHOSPHATE 4 MG: 4 INJECTION, SOLUTION INTRAMUSCULAR; INTRAVENOUS at 07:42

## 2019-12-02 RX ADMIN — FENTANYL CITRATE 100 MCG: 50 INJECTION, SOLUTION INTRAMUSCULAR; INTRAVENOUS at 07:26

## 2019-12-02 RX ADMIN — MIDAZOLAM 2 MG: 1 INJECTION INTRAMUSCULAR; INTRAVENOUS at 07:26

## 2019-12-02 RX ADMIN — HYDROMORPHONE HYDROCHLORIDE 0.5 MG: 2 INJECTION INTRAMUSCULAR; INTRAVENOUS; SUBCUTANEOUS at 10:25

## 2019-12-02 RX ADMIN — SODIUM CHLORIDE 100 ML/HR: 900 INJECTION, SOLUTION INTRAVENOUS at 06:44

## 2019-12-02 RX ADMIN — ROCURONIUM BROMIDE 50 MG: 10 INJECTION, SOLUTION INTRAVENOUS at 07:26

## 2019-12-02 RX ADMIN — LIDOCAINE HYDROCHLORIDE 50 MG: 10 INJECTION, SOLUTION EPIDURAL; INFILTRATION; INTRACAUDAL; PERINEURAL at 07:26

## 2019-12-02 RX ADMIN — FENTANYL CITRATE 25 MCG: 50 INJECTION, SOLUTION INTRAMUSCULAR; INTRAVENOUS at 10:55

## 2019-12-02 RX ADMIN — HYDROMORPHONE HYDROCHLORIDE 0.5 MG: 2 INJECTION INTRAMUSCULAR; INTRAVENOUS; SUBCUTANEOUS at 08:40

## 2019-12-02 RX ADMIN — CEFAZOLIN SODIUM 2 G: 1 INJECTION, POWDER, FOR SOLUTION INTRAMUSCULAR; INTRAVENOUS at 07:39

## 2019-12-02 RX ADMIN — ALUMINUM HYDROXIDE, MAGNESIUM HYDROXIDE, AND DIMETHICONE 15 ML: 400; 400; 40 SUSPENSION ORAL at 19:06

## 2019-12-02 RX ADMIN — PROPOFOL 200 MG: 10 INJECTION, EMULSION INTRAVENOUS at 07:26

## 2019-12-02 RX ADMIN — SODIUM CHLORIDE, SODIUM LACTATE, POTASSIUM CHLORIDE, AND CALCIUM CHLORIDE: .6; .31; .03; .02 INJECTION, SOLUTION INTRAVENOUS at 08:20

## 2019-12-02 RX ADMIN — RAMIPRIL 20 MG: 5 CAPSULE ORAL at 13:29

## 2019-12-02 RX ADMIN — FENTANYL CITRATE 25 MCG: 50 INJECTION, SOLUTION INTRAMUSCULAR; INTRAVENOUS at 11:16

## 2019-12-02 RX ADMIN — Medication 3 MG: at 10:22

## 2019-12-02 RX ADMIN — HYDROMORPHONE HYDROCHLORIDE 0.5 MG: 2 INJECTION INTRAMUSCULAR; INTRAVENOUS; SUBCUTANEOUS at 08:50

## 2019-12-02 RX ADMIN — ONDANSETRON 4 MG: 2 INJECTION INTRAMUSCULAR; INTRAVENOUS at 10:15

## 2019-12-02 RX ADMIN — LABETALOL 20 MG/4 ML (5 MG/ML) INTRAVENOUS SYRINGE 5 MG: at 09:03

## 2019-12-02 RX ADMIN — PHENYLEPHRINE HYDROCHLORIDE 100 MCG: 10 INJECTION INTRAVENOUS at 07:45

## 2019-12-02 RX ADMIN — FENTANYL CITRATE 25 MCG: 50 INJECTION, SOLUTION INTRAMUSCULAR; INTRAVENOUS at 08:18

## 2019-12-02 RX ADMIN — OXYCODONE HYDROCHLORIDE 10 MG: 5 TABLET ORAL at 19:06

## 2019-12-02 RX ADMIN — ROCURONIUM BROMIDE 10 MG: 10 INJECTION, SOLUTION INTRAVENOUS at 08:23

## 2019-12-02 RX ADMIN — CEFAZOLIN 1 G: 1 INJECTION, POWDER, FOR SOLUTION INTRAMUSCULAR; INTRAVENOUS at 13:29

## 2019-12-02 RX ADMIN — MONTELUKAST SODIUM 10 MG: 10 TABLET, COATED ORAL at 13:29

## 2019-12-02 RX ADMIN — SODIUM CHLORIDE 100 ML/HR: 900 INJECTION, SOLUTION INTRAVENOUS at 12:40

## 2019-12-02 RX ADMIN — HYDROMORPHONE HYDROCHLORIDE 0.5 MG: 2 INJECTION INTRAMUSCULAR; INTRAVENOUS; SUBCUTANEOUS at 08:58

## 2019-12-02 RX ADMIN — FENTANYL CITRATE 50 MCG: 50 INJECTION, SOLUTION INTRAMUSCULAR; INTRAVENOUS at 07:49

## 2019-12-02 RX ADMIN — HYDROMORPHONE HYDROCHLORIDE 0.5 MG: 2 INJECTION, SOLUTION INTRAMUSCULAR; INTRAVENOUS; SUBCUTANEOUS at 13:03

## 2019-12-02 RX ADMIN — ROCURONIUM BROMIDE 10 MG: 10 INJECTION, SOLUTION INTRAVENOUS at 09:03

## 2019-12-02 RX ADMIN — CEFAZOLIN 1 G: 1 INJECTION, POWDER, FOR SOLUTION INTRAMUSCULAR; INTRAVENOUS at 22:43

## 2019-12-02 RX ADMIN — GLYCOPYRROLATE 0.4 MG: 0.2 INJECTION, SOLUTION INTRAMUSCULAR; INTRAVENOUS at 10:22

## 2019-12-02 RX ADMIN — DIPHENHYDRAMINE HYDROCHLORIDE 12.5 MG: 50 INJECTION, SOLUTION INTRAMUSCULAR; INTRAVENOUS at 07:34

## 2019-12-02 RX ADMIN — FENTANYL CITRATE 25 MCG: 50 INJECTION, SOLUTION INTRAMUSCULAR; INTRAVENOUS at 08:23

## 2019-12-02 RX ADMIN — ALUMINUM HYDROXIDE, MAGNESIUM HYDROXIDE, AND DIMETHICONE 15 ML: 400; 400; 40 SUSPENSION ORAL at 13:29

## 2019-12-02 NOTE — ANESTHESIA PROCEDURE NOTES
Airway  Urgency: elective    Date/Time: 12/2/2019 7:30 AM  Airway not difficult    General Information and Staff    Patient location during procedure: OR  Anesthesiologist: Desean Alberto MD  CRNA: Otf Walker AA    Indications and Patient Condition  Indications for airway management: airway protection    Preoxygenated: yes  MILS maintained throughout  Mask difficulty assessment: 1 - vent by mask    Final Airway Details  Final airway type: endotracheal airway      Successful airway: ETT  Cuffed: yes   Successful intubation technique: direct laryngoscopy  Endotracheal tube insertion site: oral  Blade: Shantal  Blade size: 3  ETT size (mm): 7.5  Cormack-Lehane Classification: grade IIa - partial view of glottis  Placement verified by: chest auscultation and capnometry   Cuff volume (mL): 10  Measured from: lips  ETT/EBT  to lips (cm): 23  Number of attempts at approach: 1  Assessment: lips, teeth, and gum same as pre-op and atraumatic intubation    Additional Comments  Atraumatic intubation. Soft gauze bite block inserted

## 2019-12-02 NOTE — OP NOTE
VENTRAL/INCISIONAL HERNIA REPAIR  Procedure Report    Patient Name:  Brianna Caldera  YOB: 1955    Date of Surgery:  12/2/2019     Indications: Large incisional hernia with wide separation of muscle    Pre-op Diagnosis:   Incisional hernia [K43.2]       Post-Op Diagnosis Codes:     * Incisional hernia [K43.2]    Procedure/CPT® Codes:      Procedure(s):  OPEN INCISIONAL HERNIA REPAIR BILATERAL COMPONENT RELEASE WITH MESH.    Staff:  Surgeon(s):  Tomas Alamo DO    Assistant: Palma Goldberg RNFA    Anesthesia: General    Anesthetist: PAVEL Hartman    Estimated Blood Loss: 200 mL    Implants:    Implant Name Type Inv. Item Serial No.  Lot No. LRB No. Used   MESH BRIELLE COMPOSIX LP 8X10IN ELIPS - [id number] Implant MESH BRIELLE COMPOSIX LP 8X10IN ELIPS  [employer]  ([employer]) [id number] N/A 1   MESH BRIELLE FLUT SHT [id number] - [id number] Implant MESH BRIELLE FLUT SHT [id number]  [employer]  ([employer]) [id number] N/A 1       Specimen:          Specimens     ID Source Type Tests Collected By Collected At Frozen?      A Hernia, Sac Tissue · TISSUE PATHOLOGY EXAM   Tomas Alamo DO 12/2/19 0804 No     Description: HERNIA SAC              Findings: Large upper midline incisional hernia with wide muscle separation    Complications: None    Description of Procedure: Mrs. Caldera is a very pleasant female with a large incisional hernia in the upper midline.  Exploratory surgery for bowel issues sometime ago and has developed wide separation of the muscles with a large hernia sac.  Is getting larger and she is ready at this point to proceed with definitive surgical treatment.  I have explained because of the wide separation of the muscle that she would of necessity require an open repair with bilateral component release and placement of mesh.  We discussed the procedure and risks of mesh infection etc.  She understands those accepts those and wishes to proceed.    Patient was taken operating room  placed in the supine position.  General was done by PAVEL Hartman and covering anesthesiologist.  Abdomen prepped and draped in the usual manner after 3-minute dry time.  Timeout done and identity verified.  Proposed incision was outlined with a skin marker from the xiphoid to just below the umbilicus.  Incision was carried through skin with scalpel and then deeper with cautery through the thick abdominal fat layer down to the muscle.  The muscle and the hernia sac were identified and the hernia sac entered.  Finger was then placed in the hernia sac protecting intra-abdominal contents while we continued to extend the incision.  She had some small bowel and omentum stuck in the hernia sac and this was taken down.  Once all the omentum and bowel was freed from the anterior abdominal wall on both sides then we excised the large hernia sac using cautery.  It then became apparent that we were going to have to do a bilateral component release to get the muscle to touch in the midline so we then got in the correct plane above the anterior abdominal wall musculature and carried it out laterally to the anterior axillary line on the both sides. Bleeders  were pinpoint cauterized.  We then incised the external oblique fascia from  the rib cage to down near the hip bone on both sides.  This allowed the muscle then to be able to be pulled back to the midline and touch.  We then chose a Davol dual Composix 8 x 10 inch mesh for the intraperitoneal portion and it was secured transversely with the 10inch wide portion  being placed transversely .It was secured with interrupted #1 Prolene stitches and 2 rows on each side.  This gave us good support to the closure of the muscle at the midline.  Once the mesh was secured then the muscle of the midline was closed with interrupted #1 Prolenes and with each bite of the Prolene we incorporated the polypropylene portion of the mesh to hold the mesh to the anterior abdominal wall  appropriately.  Once that was all accomplished then we felt we needed to place polypropylene mesh over the divided external oblique for fear that she would develop a hernia there so 10 x 14 mesh was chosen for onlay and it was trimmed to size and then it was secured with interrupted 2-0 Prolenes throughout firmly securing the mesh to the anterior abdominal wall.  Prior to closing the muscle we placed a vial of Irrisept within the abdominal cavity and we also placed a 19 Regan drain that exited in the midline above the pubis and secured it with a 2-0 nylon.  We then after the muscle was closed and the second mesh applied we irrigated the subcu space with another vial of Irrisept leaving it for 2 minutes.  We then placed 2 #15 Regan drains one on each side of the abdominal wall to place in the subcu space to keep it adequately drained while the mesh incorporates over the next week or so.  We then approximated the dermis with interrupted 0 Vicryl stitches and then the skin was approximated with a skin stapler.  Sterile dressing was applied and an abdominal binder was then applied.  She tolerated the procedure well was awakened and transferred to recovery in satisfactory condition, the final sponge, instrument and needle counts were correct .  She was transferred to the recovery room in satisfactory condition.  Tomas Alamo DO     Date: 12/2/2019  Time: 10:28 AM

## 2019-12-02 NOTE — ANESTHESIA PREPROCEDURE EVALUATION
Anesthesia Evaluation     Patient summary reviewed and Nursing notes reviewed   NPO Solid Status: > 8 hours  NPO Liquid Status: > 8 hours           Airway   Mallampati: II  TM distance: >3 FB  Neck ROM: full  No difficulty expected  Dental      Pulmonary    Cardiovascular   Exercise tolerance: good (4-7 METS)    (+) hypertension, hyperlipidemia,       Neuro/Psych  (+) TIA,     GI/Hepatic/Renal/Endo    (+) obesity, morbid obesity, GERD,      Musculoskeletal     Abdominal    Substance History      OB/GYN          Other                        Anesthesia Plan    ASA 3     general   (Benadryl proph for h/o post op itching)    Anesthetic plan, all risks, benefits, and alternatives have been provided, discussed and informed consent has been obtained with: patient.

## 2019-12-02 NOTE — ANESTHESIA POSTPROCEDURE EVALUATION
Patient: Brianna Caldera    Procedure Summary     Date:  12/02/19 Room / Location:  [facility] OR 08 / [facility] MAIN OR    Anesthesia Start:  0722 Anesthesia Stop:  1038    Procedure:  OPEN INCISIONAL HERNIA REPAIR BILATERAL COMPONENT RELEASE WITH MESH. (N/A Abdomen) Diagnosis:       Incisional hernia      (Incisional hernia [K43.2])    Surgeon:  Tomas Alamo DO Provider:  Desean Alberto MD    Anesthesia Type:  general ASA Status:  3          Anesthesia Type: general  Last vitals  BP   143/98 (12/02/19 1050)   Temp   98.5 °F (36.9 °C) (12/02/19 1035)   Pulse   72 (12/02/19 1050)   Resp   20 (12/02/19 1050)     SpO2   94 % (12/02/19 1050)     Post Anesthesia Care and Evaluation    Patient location during evaluation: PACU  Patient participation: complete - patient participated  Level of consciousness: awake and alert  Pain score: 1  Pain management: adequate  Airway patency: patent  Anesthetic complications: No anesthetic complications  PONV Status: none  Cardiovascular status: acceptable  Respiratory status: acceptable  Hydration status: acceptable

## 2019-12-03 LAB
ALBUMIN SERPL-MCNC: 3.6 G/DL (ref 3.5–5.2)
ALBUMIN/GLOB SERPL: 1.2 G/DL
ALP SERPL-CCNC: 116 U/L (ref 39–117)
ALT SERPL W P-5'-P-CCNC: 18 U/L (ref 1–33)
ANION GAP SERPL CALCULATED.3IONS-SCNC: 9 MMOL/L (ref 5–15)
AST SERPL-CCNC: 17 U/L (ref 1–32)
BASOPHILS # BLD AUTO: 0 10*3/MM3 (ref 0–0.2)
BASOPHILS NFR BLD AUTO: 0.3 % (ref 0–1.5)
BILIRUB SERPL-MCNC: 0.7 MG/DL (ref 0.2–1.2)
BUN BLD-MCNC: 17 MG/DL (ref 8–23)
BUN/CREAT SERPL: 21.8 (ref 7–25)
CALCIUM SPEC-SCNC: 9.8 MG/DL (ref 8.6–10.5)
CHLORIDE SERPL-SCNC: 103 MMOL/L (ref 98–107)
CO2 SERPL-SCNC: 24 MMOL/L (ref 22–29)
CREAT BLD-MCNC: 0.78 MG/DL (ref 0.57–1)
DEPRECATED RDW RBC AUTO: 45.5 FL (ref 37–54)
EOSINOPHIL # BLD AUTO: 0.1 10*3/MM3 (ref 0–0.4)
EOSINOPHIL NFR BLD AUTO: 0.7 % (ref 0.3–6.2)
ERYTHROCYTE [DISTWIDTH] IN BLOOD BY AUTOMATED COUNT: 14.8 % (ref 12.3–15.4)
GFR SERPL CREATININE-BSD FRML MDRD: 75 ML/MIN/1.73
GLOBULIN UR ELPH-MCNC: 2.9 GM/DL
GLUCOSE BLD-MCNC: 136 MG/DL (ref 65–99)
HCT VFR BLD AUTO: 40.1 % (ref 34–46.6)
HGB BLD-MCNC: 13.7 G/DL (ref 12–15.9)
LYMPHOCYTES # BLD AUTO: 0.9 10*3/MM3 (ref 0.7–3.1)
LYMPHOCYTES NFR BLD AUTO: 9 % (ref 19.6–45.3)
MAGNESIUM SERPL-MCNC: 1.9 MG/DL (ref 1.6–2.4)
MCH RBC QN AUTO: 29.9 PG (ref 26.6–33)
MCHC RBC AUTO-ENTMCNC: 34.2 G/DL (ref 31.5–35.7)
MCV RBC AUTO: 87.5 FL (ref 79–97)
MONOCYTES # BLD AUTO: 1 10*3/MM3 (ref 0.1–0.9)
MONOCYTES NFR BLD AUTO: 9.7 % (ref 5–12)
NEUTROPHILS # BLD AUTO: 8.2 10*3/MM3 (ref 1.7–7)
NEUTROPHILS NFR BLD AUTO: 80.3 % (ref 42.7–76)
NRBC BLD AUTO-RTO: 0 /100 WBC (ref 0–0.2)
PHOSPHATE SERPL-MCNC: 1.8 MG/DL (ref 2.5–4.5)
PLATELET # BLD AUTO: 243 10*3/MM3 (ref 140–450)
PMV BLD AUTO: 7.7 FL (ref 6–12)
POTASSIUM BLD-SCNC: 3.7 MMOL/L (ref 3.5–5.2)
PROT SERPL-MCNC: 6.5 G/DL (ref 6–8.5)
RBC # BLD AUTO: 4.58 10*6/MM3 (ref 3.77–5.28)
SODIUM BLD-SCNC: 136 MMOL/L (ref 136–145)
WBC NRBC COR # BLD: 10.2 10*3/MM3 (ref 3.4–10.8)

## 2019-12-03 PROCEDURE — 99232 SBSQ HOSP IP/OBS MODERATE 35: CPT | Performed by: HOSPITALIST

## 2019-12-03 PROCEDURE — 83735 ASSAY OF MAGNESIUM: CPT | Performed by: SURGERY

## 2019-12-03 PROCEDURE — 80053 COMPREHEN METABOLIC PANEL: CPT | Performed by: SURGERY

## 2019-12-03 PROCEDURE — 85025 COMPLETE CBC W/AUTO DIFF WBC: CPT | Performed by: SURGERY

## 2019-12-03 PROCEDURE — 25010000003 CEFAZOLIN PER 500 MG: Performed by: SURGERY

## 2019-12-03 PROCEDURE — 84100 ASSAY OF PHOSPHORUS: CPT | Performed by: SURGERY

## 2019-12-03 RX ORDER — ONDANSETRON 2 MG/ML
INJECTION INTRAMUSCULAR; INTRAVENOUS AS NEEDED
Status: DISCONTINUED | OUTPATIENT
Start: 2019-12-02 | End: 2019-12-03 | Stop reason: SURG

## 2019-12-03 RX ADMIN — HYDROCODONE BITARTRATE AND ACETAMINOPHEN 1 TABLET: 7.5; 325 TABLET ORAL at 22:32

## 2019-12-03 RX ADMIN — HYDROCODONE BITARTRATE AND ACETAMINOPHEN 1 TABLET: 7.5; 325 TABLET ORAL at 02:21

## 2019-12-03 RX ADMIN — CEFAZOLIN 1 G: 1 INJECTION, POWDER, FOR SOLUTION INTRAMUSCULAR; INTRAVENOUS at 08:13

## 2019-12-03 RX ADMIN — HYDROCODONE BITARTRATE AND ACETAMINOPHEN 1 TABLET: 7.5; 325 TABLET ORAL at 12:59

## 2019-12-03 RX ADMIN — RAMIPRIL 20 MG: 5 CAPSULE ORAL at 06:11

## 2019-12-03 RX ADMIN — CEFAZOLIN 1 G: 1 INJECTION, POWDER, FOR SOLUTION INTRAMUSCULAR; INTRAVENOUS at 16:35

## 2019-12-03 RX ADMIN — PANTOPRAZOLE SODIUM 40 MG: 40 TABLET, DELAYED RELEASE ORAL at 06:10

## 2019-12-03 RX ADMIN — CEFAZOLIN 1 G: 1 INJECTION, POWDER, FOR SOLUTION INTRAMUSCULAR; INTRAVENOUS at 22:32

## 2019-12-03 RX ADMIN — HYDROCODONE BITARTRATE AND ACETAMINOPHEN 1 TABLET: 7.5; 325 TABLET ORAL at 08:13

## 2019-12-03 RX ADMIN — SODIUM CHLORIDE 100 ML/HR: 900 INJECTION, SOLUTION INTRAVENOUS at 08:15

## 2019-12-03 RX ADMIN — HYDROCODONE BITARTRATE AND ACETAMINOPHEN 1 TABLET: 7.5; 325 TABLET ORAL at 18:21

## 2019-12-03 RX ADMIN — MONTELUKAST SODIUM 10 MG: 10 TABLET, COATED ORAL at 08:13

## 2019-12-03 NOTE — PLAN OF CARE
Problem: Patient Care Overview  Goal: Plan of Care Review  Outcome: Ongoing (interventions implemented as appropriate)   12/03/19 0000   Coping/Psychosocial   Plan of Care Reviewed With patient   Plan of Care Review   Progress improving   OTHER   Outcome Summary Patient pain is controlled and minimal. States more gas discomfort. Patient encouraged to ambulate

## 2019-12-03 NOTE — PROGRESS NOTES
LOS: 1 day   Patient Care Team:  Maegan Mohr MD as PCP - General (Family Medicine)    Reason for follow-up: Postop    Subjective   Patient seen and examined.  Is been up with help to the chair.  Barragan not removed yet    Objective   Dressings clean dry and intact.  Drain tubes with appropriate color and output    Vital Signs  Vitals:    12/02/19 1837 12/02/19 2209 12/03/19 0240 12/03/19 0607   BP: 155/89 155/87 136/79 132/78   BP Location:  Right arm  Right arm   Patient Position:  Lying  Lying   Pulse: 86 100 92 85   Resp: 16 20 12 14   Temp: 99.5 °F (37.5 °C) 98.7 °F (37.1 °C) 98.6 °F (37 °C) 98.3 °F (36.8 °C)   TempSrc:  Oral  Oral   SpO2: 90% 92% 93% 92%   Weight:       Height:             Results Review:       Lab Results (last 24 hours)     Procedure Component Value Units Date/Time    Tissue Pathology Exam [[id number]] Collected:  12/02/19 0804    Specimen:  Tissue from Hernia, Sac Updated:  12/03/19 0627    Phosphorus [[id number]]  (Abnormal) Collected:  12/03/19 0333    Specimen:  Blood Updated:  12/03/19 0449     Phosphorus 1.8 mg/dL     Comprehensive Metabolic Panel [[id number]]  (Abnormal) Collected:  12/03/19 0333    Specimen:  Blood Updated:  12/03/19 0443     Glucose 136 mg/dL      BUN 17 mg/dL      Creatinine 0.78 mg/dL      Sodium 136 mmol/L      Potassium 3.7 mmol/L      Chloride 103 mmol/L      CO2 24.0 mmol/L      Calcium 9.8 mg/dL      Total Protein 6.5 g/dL      Albumin 3.60 g/dL      ALT (SGPT) 18 U/L      AST (SGOT) 17 U/L      Alkaline Phosphatase 116 U/L      Total Bilirubin 0.7 mg/dL      eGFR Non African Amer 75 mL/min/1.73      Globulin 2.9 gm/dL      A/G Ratio 1.2 g/dL      BUN/Creatinine Ratio 21.8     Anion Gap 9.0 mmol/L     Narrative:       GFR Normal >60  Chronic Kidney Disease <60  Kidney Failure <15    Magnesium [[id number]]  (Normal) Collected:  12/03/19 0333    Specimen:  Blood Updated:  12/03/19 0443     Magnesium 1.9 mg/dL     CBC & Differential [[id number]]  Collected:  12/03/19 0333    Specimen:  Blood Updated:  12/03/19 0434    Narrative:       The following orders were created for panel order CBC & Differential.  Procedure                               Abnormality         Status                     ---------                               -----------         ------                     CBC Auto Differential[[id number]]        Abnormal            Final result                 Please view results for these tests on the individual orders.    CBC Auto Differential [[id number]]  (Abnormal) Collected:  12/03/19 0333    Specimen:  Blood Updated:  12/03/19 0434     WBC 10.20 10*3/mm3      RBC 4.58 10*6/mm3      Hemoglobin 13.7 g/dL      Hematocrit 40.1 %      MCV 87.5 fL      MCH 29.9 pg      MCHC 34.2 g/dL      RDW 14.8 %      RDW-SD 45.5 fl      MPV 7.7 fL      Platelets 243 10*3/mm3      Neutrophil % 80.3 %      Lymphocyte % 9.0 %      Monocyte % 9.7 %      Eosinophil % 0.7 %      Basophil % 0.3 %      Neutrophils, Absolute 8.20 10*3/mm3      Lymphocytes, Absolute 0.90 10*3/mm3      Monocytes, Absolute 1.00 10*3/mm3      Eosinophils, Absolute 0.10 10*3/mm3      Basophils, Absolute 0.00 10*3/mm3      nRBC 0.0 /100 WBC            Imaging Results (Last 24 Hours)     ** No results found for the last 24 hours. **          Medication Review:   Current Facility-Administered Medications:   •  aluminum-magnesium hydroxide-simethicone (MAALOX MAX) 400-400-40 MG/5ML suspension 15 mL, 15 mL, Oral, Q4H PRN, Tomas Alamo DO, 15 mL at 12/02/19 1906  •  ceFAZolin 1 gm IVPB in 100 mL NS (MBP), 1 g, Intravenous, Q8H, Tomas Alamo DO, 1 g at 12/03/19 0813  •  HYDROcodone-acetaminophen (NORCO) 7.5-325 MG per tablet 1 tablet, 1 tablet, Oral, Q4H PRN, Tomas Alamo DO, 1 tablet at 12/03/19 0813  •  HYDROmorphone (DILAUDID) injection 0.5 mg, 0.5 mg, Intravenous, Q2H PRN, 0.5 mg at 12/02/19 1504 **AND** naloxone (NARCAN) injection 0.1 mg, 0.1 mg, Intravenous, Q5 Min PRN, Tomas Alamo,  DO  •  montelukast (SINGULAIR) tablet 10 mg, 10 mg, Oral, Daily, Tomas Alamo, DO, 10 mg at 12/03/19 0813  •  Morphine sulfate (PF) injection 4 mg, 4 mg, Intravenous, Q2H PRN **AND** naloxone (NARCAN) injection 0.4 mg, 0.4 mg, Intravenous, Q5 Min PRN, Tomas Alamo, DO  •  ondansetron (ZOFRAN) tablet 4 mg, 4 mg, Oral, Q6H PRN **OR** ondansetron (ZOFRAN) injection 4 mg, 4 mg, Intravenous, Q6H PRN, Tomas Alamo, DO  •  oxyCODONE (ROXICODONE) immediate release tablet 10 mg, 10 mg, Oral, Q4H PRN, Tomas Alamo, DO, 10 mg at 12/02/19 1906  •  pantoprazole (PROTONIX) EC tablet 40 mg, 40 mg, Oral, Q AM, Tomas Alamo, DO, 40 mg at 12/03/19 0610  •  promethazine (PHENERGAN) IVPB 12.5 mg, 12.5 mg, Intravenous, Q6H PRN **OR** promethazine (PHENERGAN) injection 12.5 mg, 12.5 mg, Intramuscular, Q6H PRN, Tomas Alamo, DO  •  ramipril (ALTACE) capsule 20 mg, 20 mg, Oral, QAM, Tomas Alamo, DO, 20 mg at 12/03/19 0611  •  sodium chloride 0.9 % infusion, 100 mL/hr, Intravenous, Continuous, Tomas Alamo DO, Last Rate: 100 mL/hr at 12/02/19 1240, 100 mL/hr at 12/02/19 1240  •  sodium chloride 0.9 % infusion, 100 mL/hr, Intravenous, Continuous, Tomas Alamo DO, Last Rate: 100 mL/hr at 12/03/19 0815, 100 mL/hr at 12/03/19 0815    Assessment/Plan         Incisional hernia      Impression: Stop day #1 open incisional hernia repair with bilateral component release and mesh placement    Plan: Ambulation, incentive spirometry, full liquid diet until passing flatus        Tomas Alamo DO  12/03/19  9:18 AM

## 2019-12-03 NOTE — PROGRESS NOTES
Discharge Planning Assessment  [facility]     Patient Name: Brianna Caldera  MRN: [id number]  Today's Date: 12/3/2019    Admit Date: 12/2/2019    Discharge Needs Assessment     Row Name 12/03/19 1521       Living Environment    Lives With  spouse    Current Living Arrangements  home/apartment/condo    Primary Care Provided by  self    Provides Primary Care For  no one    Family Caregiver if Needed  spouse    Quality of Family Relationships  helpful;involved    Able to Return to Prior Arrangements  yes       Resource/Environmental Concerns    Resource/Environmental Concerns  none    Transportation Concerns  car, none       Transition Planning    Patient/Family Anticipates Transition to  home with family    Patient/Family Anticipated Services at Transition  none    Transportation Anticipated  family or friend will provide       Discharge Needs Assessment    Readmission Within the Last 30 Days  no previous admission in last 30 days    Concerns to be Addressed  denies needs/concerns at this time;discharge planning    Equipment Currently Used at Home  none    Anticipated Changes Related to Illness  none    Equipment Needed After Discharge  none        Discharge Plan     Row Name 12/03/19 1521       Plan    Plan  Anticipate routine home.     Patient/Family in Agreement with Plan  yes    Plan Comments  Met with patient at bedside. She lives at home with spouse. She is normally IADLs. Patient still drives. No DME reported. PCP YASMINE Newton. No issues with affording medications. Currently denies any discharge needs or concerns at this time. DC barriers: IVF, full liquid diet.           Expected Discharge Date and Time     Expected Discharge Date Expected Discharge Time    Dec 5, 2019         Demographic Summary     Row Name 12/03/19 1518       General Information    Admission Type  inpatient    Arrived From  home    Referral Source  admission list    Reason for Consult  discharge planning    Preferred Language  English      Used During This Interaction  no        Functional Status     Row Name 12/03/19 1521       Functional Status    Usual Activity Tolerance  good    Current Activity Tolerance  good       Functional Status, IADL    Medications  independent    Meal Preparation  independent    Housekeeping  independent    Laundry  independent    Shopping  independent       Mental Status    General Appearance WDL  WDL       Mental Status Summary    Recent Changes in Mental Status/Cognitive Functioning  no changes              Sharyn Savage RN

## 2019-12-03 NOTE — H&P
[facility] Medicine Services      Patient Name: Brianna Caldera  : 1955  MRN: [id number]  Primary Care Physician: Maegan Mohr MD  Date of admission: 2019    Patient Care Team:  Maegan Mohr MD as PCP - General (Family Medicine)          Subjective   History Present Illness     Chief Complaint: No chief complaint on file.  Medical management of chronic medical issues especially hypertension and other chronic medical issues.    HPI    Ms. Caldera is a 64 y.o.  presents to [facility] underwent repair of incision hernia with OPEN INCISIONAL HERNIA REPAIR BILATERAL COMPONENT RELEASE WITH MESH. we were asked to see the patient in consult post op for medical management of hypertension, GERD and other chronic medical issues. Patient denies for any chest pain or any short of breath. .          History of Present Illness    Review of Systems   All other systems reviewed and are negative.          Personal History     Past Medical History:   Past Medical History:   Diagnosis Date   • Ankle edema, bilateral    • Cataract     bilateral   • Detached vitreous humor     Comments: right   • Encounter for screening mammogram for malignant neoplasm of breast    • GERD (gastroesophageal reflux disease)     Impression: Continue OTC medications.   • Glucose intolerance (impaired glucose tolerance)     Impression: With very mild elevation in triglycerides. Improved with diet and weight loss. Continue dietary modifications discussed. No medications needed at this time. Follow-up 6 mo.   • Hypertension     Impression: Stable.   • Lesion of lung     was told has spots on lungs, and was biopsied (looked like cancer), but was benign   • Morbid obesity (CMS/HCC)     >40   • Other hyperlipidemia     Impression: I recommended dietary modifications. Try Mediterranean diet. Follow-up 6 mo with fasting labs prior to visit.   • Overweight     >25   • Physical exam, annual      Impression: Questions and concerns addressed. Pap smear no longer needed. Colon cancer screening current. Mammogram ordered. Fasting labs ordered. Follow-up yearly or as needed.   • Prediabetes     diet controlled.  No meds.     • Screening for depression     Negative Depression Screening (4 or less) ()   • Seasonal allergic rhinitis        Surgical History:      Past Surgical History:   Procedure Laterality Date   •  SECTION      two   • COLONOSCOPY      polyps removed   • EXPLORATORY LAPAROTOMY      for small bowel obstruction/intussusception   • LAPAROSCOPIC CHOLECYSTECTOMY     • LUNG BIOPSY     • TONSILLECTOMY     • TOTAL ABDOMINAL HYSTERECTOMY WITH SALPINGO OOPHORECTOMY     • WRIST FRACTURE SURGERY      s/p pin placement in  and repair (bone shortening) in 2016 - [facility]           Family History: family history includes Brain cancer in her brother; Breast cancer in her maternal grandmother; Cancer in her maternal grandfather; Colon cancer in her mother; Kidney cancer in her father. Otherwise pertinent FHx was reviewed and unremarkable.     Social History:  reports that she has never smoked. She has never used smokeless tobacco. She reports that she does not drink alcohol or use drugs.      Medications:  Prior to Admission medications    Medication Sig Start Date End Date Taking? Authorizing Provider   omeprazole (PRILOSEC) 10 MG capsule Take  by mouth Daily As Needed. 18  Yes Rios Zuñiga MD   furosemide (LASIX) 20 MG tablet Take 20 mg by mouth Daily As Needed. Do not take day of surgery    Rios Zuñiga MD   montelukast (SINGULAIR) 10 MG tablet TAKE 1 TABLET BY MOUTH EVERY DAY 19   Maegan Mohr MD   Multiple Vitamins-Minerals (MULTIVITAMIN ADULTS PO) Take 1 tablet by mouth Daily. Stop 19 for surgery    Rios Zuñiga MD   potassium chloride (K-DUR,KLOR-CON) 10 MEQ CR tablet Take 10 mEq by mouth Daily As  Needed. 8/28/18   ProviderRios MD   ramipril (ALTACE) 10 MG capsule Take 20 mg by mouth Every Morning. Do not take 24 hours prior to surgery    ProviderRios MD       Allergies:  No Known Allergies    Objective   Objective     Vital Signs  Temp:  [97.7 °F (36.5 °C)-99.5 °F (37.5 °C)] 99.5 °F (37.5 °C)  Heart Rate:  [69-86] 86  Resp:  [13-20] 16  BP: (119-167)/(73-98) 155/89  SpO2:  [90 %-98 %] 90 %  on  Flow (L/min):  [2-4] 2;   Device (Oxygen Therapy): room air  Body mass index is 40.72 kg/m².    Physical Exam   Constitutional: She is oriented to person, place, and time. She appears well-developed and well-nourished. No distress.   HENT:   Head: Normocephalic and atraumatic.   Right Ear: External ear normal.   Left Ear: External ear normal.   Nose: Nose normal.   Mouth/Throat: Oropharynx is clear and moist. No oropharyngeal exudate.   Eyes: Conjunctivae and EOM are normal. Pupils are equal, round, and reactive to light. Right eye exhibits no discharge. Left eye exhibits no discharge. No scleral icterus.   Neck: Normal range of motion. No JVD present. No tracheal deviation present. No thyromegaly present.   Cardiovascular: Normal rate, regular rhythm, normal heart sounds and intact distal pulses. Exam reveals no gallop and no friction rub.   No murmur heard.  Pulmonary/Chest: Effort normal and breath sounds normal. No stridor. No respiratory distress. She has no wheezes. She has no rales. She exhibits no tenderness.   Abdominal: Soft. She exhibits no distension and no mass. There is no tenderness. There is no rebound and no guarding. No hernia.   Decreased.    Musculoskeletal: Normal range of motion. She exhibits no edema, tenderness or deformity.   Lymphadenopathy:     She has no cervical adenopathy.   Neurological: She is alert and oriented to person, place, and time. No cranial nerve deficit or sensory deficit. She exhibits normal muscle tone. Coordination normal.   Skin: Skin is warm and dry. No  rash noted. She is not diaphoretic. No erythema.   Psychiatric: She has a normal mood and affect. Her behavior is normal.   Nursing note and vitals reviewed.      Results Review:  I have personally reviewed most recent lab results and agree with findings, most notably: .              Invalid input(s):  ALKPHOS  Estimated Creatinine Clearance: 65.4 mL/min (A) (by C-G formula based on SCr of 1.07 mg/dL (H)).  Brief Urine Lab Results  (Last result in the past 365 days)      Color   Clarity   Blood   Leuk Est   Nitrite   Protein   CREAT   Urine HCG        10/18/19 1624 Yellow Clear Negative Negative Negative Negative               Microbiology Results (last 10 days)     ** No results found for the last 240 hours. **          ECG/EMG Results (most recent)     None                    No radiology results for the last 7 days      Estimated Creatinine Clearance: 65.4 mL/min (A) (by C-G formula based on SCr of 1.07 mg/dL (H)).    Assessment/Plan   Assessment/Plan       Active Hospital Problems:  No notes have been filed under this hospital service.  Service: Hospitalist    Assessment / Plan    Status post OPEN INCISIONAL HERNIA REPAIR BILATERAL COMPONENT RELEASE WITH MESH, post op care as per primary, advance diet as per primary    Hypertension ..... Continue Ramipril, monitor vitals ... Reviewed.    GERD ..... Continue protonix, monitor for symptoms.    Seasonal allergy .... Noted on singular    Dvt prophylaxis with SCD.    Thanks for consult    Will follow the patient with you. .            VTE Prophylaxis - SCDs.    CODE STATUS:    Code Status and Medical Interventions:   Ordered at: 12/02/19 1154     Code Status:    CPR     Medical Interventions (Level of Support Prior to Arrest):    Full       Admission Status:  I believe this patient meets inpatient  criteria.      I discussed the patients findings and my recommendations with patient.        Electronically signed by Hayden Mcdowell MD, 12/02/19, 8:31 PM.  [facility]  [facility] Hospitalist Team

## 2019-12-04 LAB
LAB AP CASE REPORT: NORMAL
PATH REPORT.FINAL DX SPEC: NORMAL
PATH REPORT.GROSS SPEC: NORMAL

## 2019-12-04 PROCEDURE — 25010000002 PROMETHAZINE PER 50 MG: Performed by: SURGERY

## 2019-12-04 PROCEDURE — 99232 SBSQ HOSP IP/OBS MODERATE 35: CPT | Performed by: HOSPITALIST

## 2019-12-04 PROCEDURE — 25010000002 ONDANSETRON PER 1 MG: Performed by: SURGERY

## 2019-12-04 PROCEDURE — 25010000002 METHYLNALTREXONE 12 MG/0.6ML SOLUTION: Performed by: SURGERY

## 2019-12-04 RX ADMIN — HYDROCODONE BITARTRATE AND ACETAMINOPHEN 1 TABLET: 7.5; 325 TABLET ORAL at 14:44

## 2019-12-04 RX ADMIN — PANTOPRAZOLE SODIUM 40 MG: 40 TABLET, DELAYED RELEASE ORAL at 05:00

## 2019-12-04 RX ADMIN — HYDROCODONE BITARTRATE AND ACETAMINOPHEN 1 TABLET: 7.5; 325 TABLET ORAL at 09:34

## 2019-12-04 RX ADMIN — MONTELUKAST SODIUM 10 MG: 10 TABLET, COATED ORAL at 07:47

## 2019-12-04 RX ADMIN — METHYLNALTREXONE BROMIDE 12 MG: 12 INJECTION, SOLUTION SUBCUTANEOUS at 11:37

## 2019-12-04 RX ADMIN — ALUMINUM HYDROXIDE, MAGNESIUM HYDROXIDE, AND DIMETHICONE 15 ML: 400; 400; 40 SUSPENSION ORAL at 13:08

## 2019-12-04 RX ADMIN — ONDANSETRON 4 MG: 2 INJECTION INTRAMUSCULAR; INTRAVENOUS at 14:44

## 2019-12-04 RX ADMIN — PROMETHAZINE HYDROCHLORIDE 12.5 MG: 25 INJECTION INTRAMUSCULAR; INTRAVENOUS at 17:45

## 2019-12-04 RX ADMIN — RAMIPRIL 20 MG: 5 CAPSULE ORAL at 05:00

## 2019-12-04 RX ADMIN — ALUMINUM HYDROXIDE, MAGNESIUM HYDROXIDE, AND DIMETHICONE 15 ML: 400; 400; 40 SUSPENSION ORAL at 20:24

## 2019-12-04 RX ADMIN — HYDROCODONE BITARTRATE AND ACETAMINOPHEN 1 TABLET: 7.5; 325 TABLET ORAL at 04:59

## 2019-12-04 NOTE — PLAN OF CARE
Problem: Patient Care Overview  Goal: Plan of Care Review  Outcome: Ongoing (interventions implemented as appropriate)   12/04/19 0122   Coping/Psychosocial   Plan of Care Reviewed With patient   Plan of Care Review   Progress improving   OTHER   Outcome Summary Patient pain controlled. Awaiting pt. bowel function to return prior to DC.

## 2019-12-04 NOTE — PLAN OF CARE
Problem: Patient Care Overview  Goal: Plan of Care Review  Outcome: Ongoing (interventions implemented as appropriate)   12/04/19 1131   Coping/Psychosocial   Plan of Care Reviewed With patient   Plan of Care Review   Progress improving       Problem: Surgery Nonspecified (Adult)  Goal: Signs and Symptoms of Listed Potential Problems Will be Absent, Minimized or Managed (Surgery Nonspecified)  Outcome: Ongoing (interventions implemented as appropriate)

## 2019-12-04 NOTE — PROGRESS NOTES
"      [facility] Medicine Services Daily Progress Note      Hospitalist Team  LOS 2 days      Patient Care Team:  Maegan Mohr MD as PCP - General (Family Medicine)    Patient Location: 4112/1      Subjective   Subjective   Says still not passing gas,     Chief Complaint / Subjective  Says not passing gas yet, no bowel movement, still has hypoactive bowel movement, denies for any chest pain, no nausea or vomiting.     Present on Admission:  **None**      Brief Synopsis of Hospital Course/HPI    Ms. Caldera is a 64 y.o.  presents to [facility] underwent repair of incision hernia with OPEN INCISIONAL HERNIA REPAIR BILATERAL COMPONENT RELEASE WITH MESH. we were asked to see the patient in consult post op for medical management of hypertension, GERD and other chronic medical issues. Patient denies for any chest pain or any short of breath.          Date::          ROS      Objective   Objective      Vital Signs  Temp:  [98.2 °F (36.8 °C)-99.2 °F (37.3 °C)] 98.5 °F (36.9 °C)  Heart Rate:  [79-96] 94  Resp:  [12-18] 13  BP: ()/(64-85) 106/69  Oxygen Therapy  SpO2: 95 %  Pulse Oximetry Type: Intermittent  Device (Oxygen Therapy): room air  Flow (L/min): 2  Flowsheet Rows      First Filed Value   Admission Height  162.6 cm (64\") Documented at 12/02/2019 0617   Admission Weight  108 kg (237 lb 3.4 oz) Documented at 12/02/2019 0617        Intake & Output (last 3 days)       12/01 0701 - 12/02 0700 12/02 0701 - 12/03 0700 12/03 0701 - 12/04 0700 12/04 0701 - 12/05 0700    P.O.  490 1300 900    I.V. (mL/kg)  2500 (23.1)      Total Intake(mL/kg)  2990 (27.7) 1300 (12) 900 (8.3)    Urine (mL/kg/hr)  1175 (0.5) 2100 (0.8) 1400 (1.5)    Drains  510 185 150    Blood  200      Total Output  1885 2285 1550    Net  +1105 -985 -650                Lines, Drains & Airways    Active LDAs     Name:   Placement date:   Placement time:   Site:   Days:    Peripheral IV 12/02/19 0640 " Left;Posterior Hand   12/02/19    0640    Hand   1    Closed/Suction Drain 1 Anterior Abdomen Bulb 19 Fr.   12/02/19    0859    Abdomen   1    Closed/Suction Drain 2 Anterior;Right Abdomen Bulb 15 Fr.   12/02/19    0900    Abdomen   1    Closed/Suction Drain 3 Anterior;Left Abdomen Bulb 15 Fr.   12/02/19    1017    Abdomen   1                  Physical Exam:    Physical Exam   Constitutional: She is oriented to person, place, and time. She appears well-developed and well-nourished. No distress.   HENT:   Head: Normocephalic and atraumatic.   Right Ear: External ear normal.   Left Ear: External ear normal.   Nose: Nose normal.   Mouth/Throat: Oropharynx is clear and moist. No oropharyngeal exudate.   Eyes: Conjunctivae and EOM are normal. Pupils are equal, round, and reactive to light. Right eye exhibits no discharge. Left eye exhibits no discharge. No scleral icterus.   Neck: Normal range of motion. No JVD present. No tracheal deviation present. No thyromegaly present.   Cardiovascular: Normal rate, regular rhythm, normal heart sounds and intact distal pulses. Exam reveals no gallop and no friction rub.   No murmur heard.  Pulmonary/Chest: Effort normal and breath sounds normal. No stridor. No respiratory distress. She has no wheezes. She has no rales. She exhibits no tenderness.   Abdominal: Soft. Bowel sounds are normal. She exhibits no distension and no mass. There is no tenderness. There is no rebound and no guarding. No hernia.   Musculoskeletal: Normal range of motion. She exhibits no edema, tenderness or deformity.   Lymphadenopathy:     She has no cervical adenopathy.   Neurological: She is alert and oriented to person, place, and time. No cranial nerve deficit or sensory deficit. She exhibits normal muscle tone. Coordination normal.   Skin: Skin is warm and dry. No rash noted. She is not diaphoretic. No erythema.   Psychiatric: She has a normal mood and affect. Her behavior is normal.   Nursing note and  "vitals reviewed.        Procedures:    Procedure(s):  OPEN INCISIONAL HERNIA REPAIR BILATERAL COMPONENT RELEASE WITH MESH.          Results Review:     I reviewed the patient's new clinical results.      Lab Results (last 24 hours)     Procedure Component Value Units Date/Time    Tissue Pathology Exam [[id number]] Collected:  12/02/19 0804    Specimen:  Tissue from Hernia, Sac Updated:  12/04/19 1325     Case Report --     Surgical Pathology Report                         Case: [id number]                                  Authorizing Provider:  Tomas Alamo DO        Collected:           12/02/2019 08:04 AM          Ordering Location:     [facility] MAIN  Received:            12/03/2019 06:27 AM                                 OR                                                                           Pathologist:           Devan Fernández MD                                                            Specimen:    Hernia, Sac, HERNIA SAC                                                                     Final Diagnosis --     Soft tissue, site not specified, herniorrhaphy:    Benign mesothelial lined fibrovascular and adipose tissue consistent with clinical history    DEBRA/tkd        Gross Description --     Received in formalin designated \"Hernia sac\" are two unoriented portion of yellow to pink fibrofatty and membranous tissue measuring 12 x 5.3 x 2.2 cm in aggregate. Sectioning reveals generally thin walls. No nodules or masses are identified. Representative sections of fibrofatty tissue are submitted in one cassette.     DEBRA/tkd            No results found for: HGBA1C                Microbiology Results (last 10 days)     ** No results found for the last 240 hours. **          ECG/EMG Results (most recent)     None                    No radiology results for the last 7 days    Xrays, labs reviewed personally by physician.    Medication Review:   I have reviewed the patient's current medication " list      Scheduled Meds    methylnaltrexone 12 mg Subcutaneous Daily   montelukast 10 mg Oral Daily   pantoprazole 40 mg Oral Q AM   ramipril 20 mg Oral QAM       Meds Infusions    sodium chloride 100 mL/hr Last Rate: 100 mL/hr (12/02/19 1240)   sodium chloride 100 mL/hr Last Rate: 100 mL/hr (12/03/19 0815)       Meds PRN  •  aluminum-magnesium hydroxide-simethicone  •  HYDROcodone-acetaminophen  •  HYDROmorphone **AND** naloxone  •  Morphine **AND** naloxone  •  ondansetron **OR** ondansetron  •  oxyCODONE  •  promethazine **OR** promethazine        Assessment/Plan   Assessment/Plan     Active Hospital Problems:  No notes have been filed under this hospital service.  Service: Hospitalist          Resolved Hospital Problems:  No notes have been filed under this hospital service.  Service: Hospitalist    Assessment and Plan.    Status post OPEN INCISIONAL HERNIA REPAIR BILATERAL COMPONENT RELEASE WITH MESH, post op care as per primary, advance diet as per primary, labs reviewed.      Hypertension vital reviewed. .... Continue Ramipril, monitor vitals ... Reviewed.     GERD ..... Continue protonix, monitor for symptoms.     Seasonal allergy .... Noted on singular     Dvt prophylaxis with SCD.          VTE Prophylaxis - SCDs.      Code Status -   Code Status and Medical Interventions:   Ordered at: 12/02/19 1154     Code Status:    CPR     Medical Interventions (Level of Support Prior to Arrest):    Full       Discharge Planning    Destination      No service coordination in this encounter.      Durable Medical Equipment      No service coordination in this encounter.      Dialysis/Infusion      No service coordination in this encounter.      Home Medical Care      No service coordination in this encounter.      Therapy      No service coordination in this encounter.      Community Resources      No service coordination in this encounter.            Electronically signed by Hayden Mcdowell MD, 12/04/19, 3:36  PM.  [facility] Hospitalist Team

## 2019-12-04 NOTE — PAYOR COMM NOTE
"Sahil Caldera (64 y.o. Female)   Request for continued stay review attached  [id number]    AUTHORIZATION PENDING.  PLEASE CALL OR FAX FINAL DETERMINATION TO:    RETURN CONTACT:  PANCHO STEINER RN  [facility]  [facility].R./  PH:[phone]  FAX:[phone]      Date of Birth Social Security Number Address Home Phone MRN    1955  [address] [id number] [phone] [id number]    Latter-day Marital Status          Unknown        Admission Date Admission Type Admitting Provider Attending Provider Department, Room/Bed    19 Elective Tomas Alamo, Tomas Vásquez DO [facility] SURGICAL INPATIENT,     Discharge Date Discharge Disposition Discharge Destination                       Attending Provider:  Tomas Alamo DO    Allergies:  No Known Allergies    Isolation:  None   Infection:  None   Code Status:  CPR    Ht:  162.6 cm (64\")   Wt:  108 kg (237 lb 3.4 oz)    Admission Cmt:  None   Principal Problem:  Incisional hernia [K43.2] More...                 Active Insurance as of 2019     Primary Coverage     Payor Plan Insurance Group Employer/Plan Group    [employer]HE[employer] BLUE CROSS [employer] BLUE CROSS BLUE SHIELD PPO [id number]     Payor Plan Address Payor Plan Phone Number Payor Plan Fax Number Effective Dates    [address] [phone]  2019 - None Entered    [place] [address]       Subscriber Name Subscriber Birth Date Member ID       SAHIL CALDERA 1955 [id number]                 Emergency Contacts      (Rel.) Home Phone Work Phone Mobile Phone    SERAFIN CALDERA \"LOUISA\" (Spouse) -- -- [phone]    Mai Floyd (Daughter) -- -- --               History & Physical      Hayden Mcdowell MD at 19                [facility] Medicine Services      Patient Name: Sahil Caldera  : 1955  MRN: [id number]  Primary Care Physician: Maegan Mohr MD  Date of admission: " 12/2/2019    Patient Care Team:  Maegan Mohr MD as PCP - General (Family Medicine)          Subjective   History Present Illness     Chief Complaint: No chief complaint on file.  Medical management of chronic medical issues especially hypertension and other chronic medical issues.    HPI    Ms. Caldera is a 64 y.o.  [facility] incision hernia with OPEN INCISIONAL HERNIA REPAIR BILATERAL COMPONENT RELEASE WITH MESH. we were asked to see the patient in consult post op for medical management of hypertension, GERD and other chronic medical issues. Patient denies for any chest pain or any short of breath. .          History of Present Illness    Review of Systems   All other systems reviewed and are negative.          Personal History     Past Medical History:   Past Medical History:   Diagnosis Date   • Ankle edema, bilateral    • Cataract     bilateral   • Detached vitreous humor     Comments: right   • Encounter for screening mammogram for malignant neoplasm of breast    • GERD (gastroesophageal reflux disease)     Impression: Continue OTC medications.   • Glucose intolerance (impaired glucose tolerance)     Impression: With very mild elevation in triglycerides. Improved with diet and weight loss. Continue dietary modifications discussed. No medications needed at this time. Follow-up 6 mo.   • Hypertension     Impression: Stable.   • Lesion of lung     was told has spots on lungs, and was biopsied (looked like cancer), but was benign   • Morbid obesity (CMS/HCC)     >40   • Other hyperlipidemia     Impression: I recommended dietary modifications. Try Mediterranean diet. Follow-up 6 mo with fasting labs prior to visit.   • Overweight     >25   • Physical exam, annual     Impression: Questions and concerns addressed. Pap smear no longer needed. Colon cancer screening current. Mammogram ordered. Fasting labs ordered. Follow-up yearly or as needed.   • Prediabetes      diet controlled.  No meds.     • Screening for depression     Negative Depression Screening (4 or less) ()   • Seasonal allergic rhinitis        Surgical History:      Past Surgical History:   Procedure Laterality Date   •  SECTION      two   • COLONOSCOPY      polyps removed   • EXPLORATORY LAPAROTOMY      for small bowel obstruction/intussusception   • LAPAROSCOPIC CHOLECYSTECTOMY     • LUNG BIOPSY     • TONSILLECTOMY     • TOTAL ABDOMINAL HYSTERECTOMY WITH SALPINGO OOPHORECTOMY     • WRIST FRACTURE SURGERY      s/p pin placement in  and repair (bone shortening) in  - [place]           Family History: family history includes Brain cancer in her brother; Breast cancer in her maternal grandmother; Cancer in her maternal grandfather; Colon cancer in her mother; Kidney cancer in her father. Otherwise pertinent FHx was reviewed and unremarkable.     Social History:  reports that she has never smoked. She has never used smokeless tobacco. She reports that she does not drink alcohol or use drugs.      Medications:  Prior to Admission medications    Medication Sig Start Date End Date Taking? Authorizing Provider   omeprazole (PRILOSEC) 10 MG capsule Take  by mouth Daily As Needed. 18  Yes Rios Zuñiga MD   furosemide (LASIX) 20 MG tablet Take 20 mg by mouth Daily As Needed. Do not take day of surgery    Rios Zuñiga MD   montelukast (SINGULAIR) 10 MG tablet TAKE 1 TABLET BY MOUTH EVERY DAY 19   Maegan Mohr MD   Multiple Vitamins-Minerals (MULTIVITAMIN ADULTS PO) Take 1 tablet by mouth Daily. Stop 19 for surgery    Rios Zuñiga MD   potassium chloride (K-DUR,KLOR-CON) 10 MEQ CR tablet Take 10 mEq by mouth Daily As Needed. 18   Riso Zuñiga MD   ramipril (ALTACE) 10 MG capsule Take 20 mg by mouth Every Morning. Do not take 24 hours prior to surgery    Rios Zuñiga MD       Allergies:  No  Known Allergies    Objective   Objective     Vital Signs  Temp:  [97.7 °F (36.5 °C)-99.5 °F (37.5 °C)] 99.5 °F (37.5 °C)  Heart Rate:  [69-86] 86  Resp:  [13-20] 16  BP: (119-167)/(73-98) 155/89  SpO2:  [90 %-98 %] 90 %  on  Flow (L/min):  [2-4] 2;   Device (Oxygen Therapy): room air  Body mass index is 40.72 kg/m².    Physical Exam   Constitutional: She is oriented to person, place, and time. She appears well-developed and well-nourished. No distress.   HENT:   Head: Normocephalic and atraumatic.   Right Ear: External ear normal.   Left Ear: External ear normal.   Nose: Nose normal.   Mouth/Throat: Oropharynx is clear and moist. No oropharyngeal exudate.   Eyes: Conjunctivae and EOM are normal. Pupils are equal, round, and reactive to light. Right eye exhibits no discharge. Left eye exhibits no discharge. No scleral icterus.   Neck: Normal range of motion. No JVD present. No tracheal deviation present. No thyromegaly present.   Cardiovascular: Normal rate, regular rhythm, normal heart sounds and intact distal pulses. Exam reveals no gallop and no friction rub.   No murmur heard.  Pulmonary/Chest: Effort normal and breath sounds normal. No stridor. No respiratory distress. She has no wheezes. She has no rales. She exhibits no tenderness.   Abdominal: Soft. She exhibits no distension and no mass. There is no tenderness. There is no rebound and no guarding. No hernia.   Decreased.    Musculoskeletal: Normal range of motion. She exhibits no edema, tenderness or deformity.   Lymphadenopathy:     She has no cervical adenopathy.   Neurological: She is alert and oriented to person, place, and time. No cranial nerve deficit or sensory deficit. She exhibits normal muscle tone. Coordination normal.   Skin: Skin is warm and dry. No rash noted. She is not diaphoretic. No erythema.   Psychiatric: She has a normal mood and affect. Her behavior is normal.   Nursing note and vitals reviewed.      Results Review:  I have personally  reviewed most recent lab results and agree with findings, most notably: .              Invalid input(s):  ALKPHOS  Estimated Creatinine Clearance: 65.4 mL/min (A) (by C-G formula based on SCr of 1.07 mg/dL (H)).  Brief Urine Lab Results  (Last result in the past 365 days)      Color   Clarity   Blood   Leuk Est   Nitrite   Protein   CREAT   Urine HCG        10/18/19 1624 Yellow Clear Negative Negative Negative Negative               Microbiology Results (last 10 days)     ** No results found for the last 240 hours. **          ECG/EMG Results (most recent)     None                    No radiology results for the last 7 days      Estimated Creatinine Clearance: 65.4 mL/min (A) (by C-G formula based on SCr of 1.07 mg/dL (H)).    Assessment/Plan   Assessment/Plan       Active Hospital Problems:  No notes have been filed under this hospital service.  Service: Hospitalist    Assessment / Plan    Status post OPEN INCISIONAL HERNIA REPAIR BILATERAL COMPONENT RELEASE WITH MESH, post op care as per primary, advance diet as per primary    Hypertension ..... Continue Ramipril, monitor vitals ... Reviewed.    GERD ..... Continue protonix, monitor for symptoms.    Seasonal allergy .... Noted on singular    Dvt prophylaxis with SCD.    Thanks for consult    Will follow the patient with you. .            VTE Prophylaxis - SCDs.    CODE STATUS:    Code Status and Medical Interventions:   Ordered at: 12/02/19 1154     Code Status:    CPR     Medical Interventions (Level of Support Prior to Arrest):    Full       Admission Status:  I believe this patient meets inpatient  criteria.      I discussed the patients findings and my recommendations with patient.        Electronically signed by Hayden Mcdowell MD, 12/02/19, 8:31 PM.  [facility], [place], operated by [facility] Hospitalist Team          Electronically signed by Hayden Mcdowell MD at 12/02/19 2040     Tomas Alamo DO at 12/02/19 0709          H&P reviewed. The patient was examined and there  are no changes to the H&P.          Electronically signed by Tomas Alamo DO at 12/02/19 4783   Source Note             Subjective   Brianna Caldera is a 64 y.o. female.     History of present illness  Ms. Caldera is seen in the office today at the request of Dr. Mohr for an abdominal wall hernia.  She had an exploratory laparotomy for small bowel intussusception and resection a year or so ago and has developed a midline incisional hernia above the umbilicus between the umbilicus and the xiphoid.  CT shows wide separation of the muscles so she is going to need component release bilaterally to be able to reconstruct this with mesh.  The office we discussed that with her and drawn her some pictures.  She understands and agrees to proceed.  She also understands she is at increased risk for infection with mesh and that will need to leave drain tubes both intraperitoneal and in the subcu layer.    Past Medical History:   Diagnosis Date   • Candidiasis    • Detached vitreous humor     Comments: right   • Encounter for screening mammogram for malignant neoplasm of breast    • GERD (gastroesophageal reflux disease)     Impression: Continue OTC medications.   • Glucose intolerance (impaired glucose tolerance)     Impression: With very mild elevation in triglycerides. Improved with diet and weight loss. Continue dietary modifications discussed. No medications needed at this time. Follow-up 6 mo.   • Hypertension     Impression: Stable.   • Morbid obesity (CMS/HCC)     >40   • Other hyperlipidemia     Impression: I recommended dietary modifications. Try Mediterranean diet. Follow-up 6 mo with fasting labs prior to visit.   • Overweight     >25   • Physical exam, annual     Impression: Questions and concerns addressed. Pap smear no longer needed. Colon cancer screening current. Mammogram ordered. Fasting labs ordered. Follow-up yearly or as needed.   • Screening for depression     Negative Depression Screening (4 or  less) ()   • Seasonal allergic rhinitis        Past Surgical History:   Procedure Laterality Date   •  SECTION      two   • EXPLORATORY LAPAROTOMY  2017    for small bowel obstruction/intussusception   • LAPAROSCOPIC CHOLECYSTECTOMY     • TONSILLECTOMY     • TOTAL ABDOMINAL HYSTERECTOMY WITH SALPINGO OOPHORECTOMY     • WRIST FRACTURE SURGERY      s/p pin placement in  and repair (bone shortening) in  - [facility] and [facility]ie       Outpatient Encounter Medications as of 2019   Medication Sig Dispense Refill   • furosemide (LASIX) 20 MG tablet FUROSEMIDE 20 MG TABS     • montelukast (SINGULAIR) 10 MG tablet Take 1 tablet by mouth Daily.     • omeprazole (PRILOSEC) 10 MG capsule Take  by mouth Daily.     • potassium chloride (K-DUR,KLOR-CON) 10 MEQ CR tablet Take  by mouth.     • ramipril (ALTACE) 10 MG capsule RAMIPRIL 10 MG CAPS       No facility-administered encounter medications on file as of 2019.        No Known Allergies    Family History   Problem Relation Age of Onset   • Colon cancer Mother    • Kidney cancer Father    • Brain cancer Brother    • Breast cancer Maternal Grandmother    • Cancer Maternal Grandfather         Bladder       Social History     Socioeconomic History   • Marital status:      Spouse name: Not on file   • Number of children: Not on file   • Years of education: Not on file   • Highest education level: Not on file   Tobacco Use   • Smoking status: Never Smoker   • Smokeless tobacco: Never Used   Substance and Sexual Activity   • Alcohol use: No     Frequency: Never   • Drug use: No   Lifestyle   • Physical activity:     Days per week: 3 days     Minutes per session: 30 min   • Stress: Not at all       The following portions of the patient's history were reviewed and updated as appropriate: allergies, current medications, past family history, past medical history, past social history, past surgical history and problem list.    Objective        Assessment/Plan   There are no diagnoses linked to this encounter.    Complete review of systems is done and unremarkable with exception of the chief complaint.    School exam shows a pleasant 64-year-old female.  HEENT is negative.  Heart regular.  Lungs are clear.  Abdomen is soft.  She has wide separation of the muscles in the midline as noted above.  Extremities show equal range of motion in the upper and lower extremities.  She has symmetrical strength and usage.  Neuro shows no obvious focal deficit.    Impression: Incisional hernia with wide separation of the muscle.    Recommendation open incisional hernia repair with bilateral component release and mesh placement           Tomas Alamo DO  11/7/2019  9:31 AM       Electronically signed by Tomas Alamo DO at 11/07/19 0931             Tomas Alamo DO at 11/07/19 0931          Subjective   Brianna Caldera is a 64 y.o. female.     History of present illness  Ms. Caldera is seen in the office today at the request of Dr. Mohr for an abdominal wall hernia.  She had an exploratory laparotomy for small bowel intussusception and resection a year or so ago and has developed a midline incisional hernia above the umbilicus between the umbilicus and the xiphoid.  CT shows wide separation of the muscles so she is going to need component release bilaterally to be able to reconstruct this with mesh.  The office we discussed that with her and drawn her some pictures.  She understands and agrees to proceed.  She also understands she is at increased risk for infection with mesh and that will need to leave drain tubes both intraperitoneal and in the subcu layer.    Past Medical History:   Diagnosis Date   • Candidiasis    • Detached vitreous humor     Comments: right   • Encounter for screening mammogram for malignant neoplasm of breast    • GERD (gastroesophageal reflux disease)     Impression: Continue OTC medications.   • Glucose intolerance (impaired  glucose tolerance)     Impression: With very mild elevation in triglycerides. Improved with diet and weight loss. Continue dietary modifications discussed. No medications needed at this time. Follow-up 6 mo.   • Hypertension     Impression: Stable.   • Morbid obesity (CMS/HCC)     >40   • Other hyperlipidemia     Impression: I recommended dietary modifications. Try Mediterranean diet. Follow-up 6 mo with fasting labs prior to visit.   • Overweight     >25   • Physical exam, annual     Impression: Questions and concerns addressed. Pap smear no longer needed. Colon cancer screening current. Mammogram ordered. Fasting labs ordered. Follow-up yearly or as needed.   • Screening for depression     Negative Depression Screening (4 or less) ()   • Seasonal allergic rhinitis        Past Surgical History:   Procedure Laterality Date   •  SECTION      two   • EXPLORATORY LAPAROTOMY      for small bowel obstruction/intussusception   • LAPAROSCOPIC CHOLECYSTECTOMY     • TONSILLECTOMY     • TOTAL ABDOMINAL HYSTERECTOMY WITH SALPINGO OOPHORECTOMY     • WRIST FRACTURE SURGERY      s/p pin placement in  and repair (bone shortening) in  - [facility]lin[facility] and Josie       Outpatient Encounter Medications as of 2019   Medication Sig Dispense Refill   • furosemide (LASIX) 20 MG tablet FUROSEMIDE 20 MG TABS     • montelukast (SINGULAIR) 10 MG tablet Take 1 tablet by mouth Daily.     • omeprazole (PRILOSEC) 10 MG capsule Take  by mouth Daily.     • potassium chloride (K-DUR,KLOR-CON) 10 MEQ CR tablet Take  by mouth.     • ramipril (ALTACE) 10 MG capsule RAMIPRIL 10 MG CAPS       No facility-administered encounter medications on file as of 2019.        No Known Allergies    Family History   Problem Relation Age of Onset   • Colon cancer Mother    • Kidney cancer Father    • Brain cancer Brother    • Breast cancer Maternal Grandmother    • Cancer Maternal Grandfather         Bladder       Social History      Socioeconomic History   • Marital status:      Spouse name: Not on file   • Number of children: Not on file   • Years of education: Not on file   • Highest education level: Not on file   Tobacco Use   • Smoking status: Never Smoker   • Smokeless tobacco: Never Used   Substance and Sexual Activity   • Alcohol use: No     Frequency: Never   • Drug use: No   Lifestyle   • Physical activity:     Days per week: 3 days     Minutes per session: 30 min   • Stress: Not at all       The following portions of the patient's history were reviewed and updated as appropriate: allergies, current medications, past family history, past medical history, past social history, past surgical history and problem list.    Objective       Assessment/Plan   There are no diagnoses linked to this encounter.    Complete review of systems is done and unremarkable with exception of the chief complaint.    School exam shows a pleasant 64-year-old female.  HEENT is negative.  Heart regular.  Lungs are clear.  Abdomen is soft.  She has wide separation of the muscles in the midline as noted above.  Extremities show equal range of motion in the upper and lower extremities.  She has symmetrical strength and usage.  Neuro shows no obvious focal deficit.    Impression: Incisional hernia with wide separation of the muscle.    Recommendation open incisional hernia repair with bilateral component release and mesh placement           Tomas Alamo DO  11/7/2019  9:31 AM      Electronically signed by Tomas Alamo DO at 11/07/19 0934          Physician Progress Notes (last 24 hours) (Notes from 12/03/19 1307 through 12/04/19 1307)      Tomas Alamo DO at 12/04/19 0959             LOS: 2 days   Patient Care Team:  Maegan Mohr MD as PCP - General (Family Medicine)    Reason for follow-up: Postop    Subjective   Patient seen and examined.  Only complaint is soreness.  No flatus yet.  She is belching.    Objective   Visions  clean dry and intact without infection.  Drain tubes with appropriate color and output    Vital Signs  Vitals:    12/03/19 1857 12/03/19 2243 12/04/19 0300 12/04/19 0500   BP: 98/64 105/71 117/77 144/85   BP Location: Right arm   Right arm   Patient Position: Lying   Sitting   Pulse: 80 85 85 96   Resp: 18 17 16 18   Temp: 98.7 °F (37.1 °C) 99.2 °F (37.3 °C) 98.5 °F (36.9 °C) 98.2 °F (36.8 °C)   TempSrc: Oral Oral  Oral   SpO2: 94% 95% 95% 93%   Weight:       Height:             Results Review:       Lab Results (last 24 hours)     ** No results found for the last 24 hours. **           Imaging Results (Last 24 Hours)     ** No results found for the last 24 hours. **          Medication Review:   Current Facility-Administered Medications:   •  aluminum-magnesium hydroxide-simethicone (MAALOX MAX) 400-400-40 MG/5ML suspension 15 mL, 15 mL, Oral, Q4H PRN, Tomas Alamo, , 15 mL at 12/02/19 1906  •  HYDROcodone-acetaminophen (NORCO) 7.5-325 MG per tablet 1 tablet, 1 tablet, Oral, Q4H PRN, Tomas Alamo, DO, 1 tablet at 12/04/19 0934  •  HYDROmorphone (DILAUDID) injection 0.5 mg, 0.5 mg, Intravenous, Q2H PRN, 0.5 mg at 12/02/19 1504 **AND** naloxone (NARCAN) injection 0.1 mg, 0.1 mg, Intravenous, Q5 Min PRN, Tomas Alamo, DO  •  montelukast (SINGULAIR) tablet 10 mg, 10 mg, Oral, Daily, Tomas Alamo DO, 10 mg at 12/04/19 0747  •  Morphine sulfate (PF) injection 4 mg, 4 mg, Intravenous, Q2H PRN **AND** naloxone (NARCAN) injection 0.4 mg, 0.4 mg, Intravenous, Q5 Min PRN, Tomas Alamo, DO  •  ondansetron (ZOFRAN) tablet 4 mg, 4 mg, Oral, Q6H PRN **OR** ondansetron (ZOFRAN) injection 4 mg, 4 mg, Intravenous, Q6H PRN, Tomas Alamo, DO  •  oxyCODONE (ROXICODONE) immediate release tablet 10 mg, 10 mg, Oral, Q4H PRN, Tomas Alamo DO, 10 mg at 12/02/19 1906  •  pantoprazole (PROTONIX) EC tablet 40 mg, 40 mg, Oral, Q AM, Tomas Alamo DO, 40 mg at 12/04/19 0500  •  promethazine (PHENERGAN) IVPB 12.5 mg,  12.5 mg, Intravenous, Q6H PRN **OR** promethazine (PHENERGAN) injection 12.5 mg, 12.5 mg, Intramuscular, Q6H PRN, Tomas Alamo DO  •  ramipril (ALTACE) capsule 20 mg, 20 mg, Oral, QAM, Tomas Alamo DO, 20 mg at 12/04/19 0500  •  sodium chloride 0.9 % infusion, 100 mL/hr, Intravenous, Continuous, Tomas Alamo DO, Last Rate: 100 mL/hr at 12/02/19 1240, 100 mL/hr at 12/02/19 1240  •  sodium chloride 0.9 % infusion, 100 mL/hr, Intravenous, Continuous, Tomas Alamo DO, Last Rate: 100 mL/hr at 12/03/19 0815, 100 mL/hr at 12/03/19 0815    Assessment/Plan         Incisional hernia      Impression: Postop day #2 open incisional hernia repair with bilateral component release and mesh placement    Plan: MiraLAX , will start Relistor as well, continue frequent ambulation and spirometry use.  Continue antibiotics      Tomas Alamo DO  12/04/19  9:59 AM            Electronically signed by Tomas Alamo DO at 12/04/19 1002     Hayden Mcdowell MD at 12/03/19 2143                [facility] Medicine Services Daily Progress Note      Hospitalist Team  LOS 1 days      Patient Care Team:  Maegan Mohr MD as PCP - General (Family Medicine)    Patient [facility]      Subjective   Subjective   No chest pain, no nausea or vomiting, no abdominal pain.    Chief Complaint / Subjective  No chief complaint on file.   denies for any new complaint, no nausea or vomiting, no abdominal pain.     Present on Admission:  **None**      Brief Synopsis of Hospital Course/HPI    Ms. Caldera is a 64 y.o.  presents to [facility] underwent repair of incision hernia with OPEN INCISIONAL HERNIA REPAIR BILATERAL COMPONENT RELEASE WITH MESH. we were asked to see the patient in consult post op for medical management of hypertension, GERD and other chronic medical issues. Patient denies for any chest pain or any short of breath. .         Date::          ROS      Objective   Objective   "    Vital Signs  Temp:  [98 °F (36.7 °C)-98.7 °F (37.1 °C)] 98.7 °F (37.1 °C)  Heart Rate:  [] 80  Resp:  [12-20] 18  BP: ()/(64-87) 98/64  Oxygen Therapy  SpO2: 94 %  Pulse Oximetry Type: Continuous  Device (Oxygen Therapy): room air  Flow (L/min): 2  Flowsheet Rows      First Filed Value   Admission Height  162.6 cm (64\") Documented at 12/02/2019 0617   Admission Weight  108 kg (237 lb 3.4 oz) Documented at 12/02/2019 0617        Intake & Output (last 3 days)       12/01 0701 - 12/02 0700 12/02 0701 - 12/03 0700 12/03 0701 - 12/04 0700    P.O.  490 840    I.V. (mL/kg)  2500 (23.1)     Total Intake(mL/kg)  2990 (27.7) 840 (7.8)    Urine (mL/kg/hr)  1175 (0.5) 1500 (0.9)    Drains  510 62.5    Blood  200     Total Output  1885 1562.5    Net  +1105 -722.5               Lines, Drains & Airways    Active LDAs     Name:   Placement date:   Placement time:   Site:   Days:    Peripheral IV 12/02/19 0640 Left;Posterior Hand   12/02/19    0640    Hand   1    Closed/Suction Drain 1 Anterior Abdomen Bulb 19 Fr.   12/02/19    0859    Abdomen   1    Closed/Suction Drain 2 Anterior;Right Abdomen Bulb 15 Fr.   12/02/19    0900    Abdomen   1    Closed/Suction Drain 3 Anterior;Left Abdomen Bulb 15 Fr.   12/02/19    1017    Abdomen   1                  Physical Exam:    Physical Exam   Constitutional: She is oriented to person, place, and time. She appears well-developed and well-nourished. No distress.   HENT:   Head: Normocephalic and atraumatic.   Right Ear: External ear normal.   Left Ear: External ear normal.   Nose: Nose normal.   Mouth/Throat: Oropharynx is clear and moist. No oropharyngeal exudate.   Eyes: Conjunctivae and EOM are normal. Pupils are equal, round, and reactive to light. Right eye exhibits no discharge. Left eye exhibits no discharge. No scleral icterus.   Neck: Normal range of motion. No JVD present. No tracheal deviation present. No thyromegaly present.   Cardiovascular: Normal rate, regular " rhythm, normal heart sounds and intact distal pulses. Exam reveals no gallop and no friction rub.   No murmur heard.  Pulmonary/Chest: Effort normal and breath sounds normal. No stridor. No respiratory distress. She has no wheezes. She has no rales. She exhibits no tenderness.   Abdominal: Soft. Bowel sounds are normal. She exhibits no distension and no mass. There is no tenderness. There is no rebound and no guarding. No hernia.   Musculoskeletal: Normal range of motion. She exhibits no edema, tenderness or deformity.   Lymphadenopathy:     She has no cervical adenopathy.   Neurological: She is alert and oriented to person, place, and time. No cranial nerve deficit or sensory deficit. She exhibits normal muscle tone. Coordination normal.   Skin: Skin is warm and dry. No rash noted. She is not diaphoretic. No erythema.   Psychiatric: She has a normal mood and affect. Her behavior is normal.   Nursing note and vitals reviewed.        Procedures:    Procedure(s):  OPEN INCISIONAL HERNIA REPAIR BILATERAL COMPONENT RELEASE WITH MESH.          Results Review:     I reviewed the patient's new clinical results.      Lab Results (last 24 hours)     Procedure Component Value Units Date/Time    Tissue Pathology Exam [[id number]] Collected:  12/02/19 0804    Specimen:  Tissue from Hernia, Sac Updated:  12/03/19 0627    Phosphorus [[id number]]  (Abnormal) Collected:  12/03/19 0333    Specimen:  Blood Updated:  12/03/19 0449     Phosphorus 1.8 mg/dL     Comprehensive Metabolic Panel [[id number]]  (Abnormal) Collected:  12/03/19 0333    Specimen:  Blood Updated:  12/03/19 0443     Glucose 136 mg/dL      BUN 17 mg/dL      Creatinine 0.78 mg/dL      Sodium 136 mmol/L      Potassium 3.7 mmol/L      Chloride 103 mmol/L      CO2 24.0 mmol/L      Calcium 9.8 mg/dL      Total Protein 6.5 g/dL      Albumin 3.60 g/dL      ALT (SGPT) 18 U/L      AST (SGOT) 17 U/L      Alkaline Phosphatase 116 U/L      Total Bilirubin 0.7 mg/dL      eGFR Non   Amer 75 mL/min/1.73      Globulin 2.9 gm/dL      A/G Ratio 1.2 g/dL      BUN/Creatinine Ratio 21.8     Anion Gap 9.0 mmol/L     Narrative:       GFR Normal >60  Chronic Kidney Disease <60  Kidney Failure <15    Magnesium [[id number]]  (Normal) Collected:  12/03/19 0333    Specimen:  Blood Updated:  12/03/19 0443     Magnesium 1.9 mg/dL     CBC & Differential [[id number]] Collected:  12/03/19 0333    Specimen:  Blood Updated:  12/03/19 0434    Narrative:       The following orders were created for panel order CBC & Differential.  Procedure                               Abnormality         Status                     ---------                               -----------         ------                     CBC Auto Differential[[id number]]        Abnormal            Final result                 Please view results for these tests on the individual orders.    CBC Auto Differential [[id number]]  (Abnormal) Collected:  12/03/19 0333    Specimen:  Blood Updated:  12/03/19 0434     WBC 10.20 10*3/mm3      RBC 4.58 10*6/mm3      Hemoglobin 13.7 g/dL      Hematocrit 40.1 %      MCV 87.5 fL      MCH 29.9 pg      MCHC 34.2 g/dL      RDW 14.8 %      RDW-SD 45.5 fl      MPV 7.7 fL      Platelets 243 10*3/mm3      Neutrophil % 80.3 %      Lymphocyte % 9.0 %      Monocyte % 9.7 %      Eosinophil % 0.7 %      Basophil % 0.3 %      Neutrophils, Absolute 8.20 10*3/mm3      Lymphocytes, Absolute 0.90 10*3/mm3      Monocytes, Absolute 1.00 10*3/mm3      Eosinophils, Absolute 0.10 10*3/mm3      Basophils, Absolute 0.00 10*3/mm3      nRBC 0.0 /100 WBC         No results found for: HGBA1C                Microbiology Results (last 10 days)     ** No results found for the last 240 hours. **          ECG/EMG Results (most recent)     None                    No radiology results for the last 7 days    Xrays, labs reviewed personally by physician.    Medication Review:   I have reviewed the patient's current medication list      Scheduled  Meds    ceFAZolin 1 g Intravenous Q8H   montelukast 10 mg Oral Daily   pantoprazole 40 mg Oral Q AM   ramipril 20 mg Oral QAM       Meds Infusions    sodium chloride 100 mL/hr Last Rate: 100 mL/hr (12/02/19 1240)   sodium chloride 100 mL/hr Last Rate: 100 mL/hr (12/03/19 0815)       Meds PRN  •  aluminum-magnesium hydroxide-simethicone  •  HYDROcodone-acetaminophen  •  HYDROmorphone **AND** naloxone  •  Morphine **AND** naloxone  •  ondansetron **OR** ondansetron  •  oxyCODONE  •  promethazine **OR** promethazine        Assessment/Plan   Assessment/Plan     Active Hospital Problems:  No notes have been filed under this hospital service.  Service: Hospitalist          Resolved Hospital Problems:  No notes have been filed under this hospital service.  Service: Hospitalist    Assessment and Plan.    Status post OPEN INCISIONAL HERNIA REPAIR BILATERAL COMPONENT RELEASE WITH MESH, post op care as per primary, advance diet as per primary     Hypertension ..... Continue Ramipril, monitor vitals ... Reviewed.     GERD ..... Continue protonix, monitor for symptoms.     Seasonal allergy .... Noted on singular     Dvt prophylaxis with SCD.          VTE Prophylaxis - SCDs.      Code Status -   Code Status and Medical Interventions:   Ordered at: 12/02/19 1154     Code Status:    CPR     Medical Interventions (Level of Support Prior to Arrest):    Full       Discharge Planning    Destination      No service coordination in this encounter.      Durable Medical Equipment      No service coordination in this encounter.      Dialysis/Infusion      No service coordination in this encounter.      Home Medical Care      No service coordination in this encounter.      Therapy      No service coordination in this encounter.      Community Resources      No service coordination in this encounter.            Electronically signed by Hayden Mcdowell MD, 12/03/19, 9:43 PM.  [facility]ist Team        Electronically signed by  Hayden Mcdowell MD at 12/03/19 2145       Consult Notes (last 24 hours) (Notes from 12/03/19 1307 through 12/04/19 1307)     No notes of this type exist for this encounter.

## 2019-12-04 NOTE — PROGRESS NOTES
"      [facility] Daily Progress Note      Hospitalist Team  LOS 1 days      Patient Care Team:  Maegan Mohr MD as PCP - General (Family Medicine)    Patient Location: 4112/1      Subjective   Subjective   No chest pain, no nausea or vomiting, no abdominal pain.    Chief Complaint / Subjective  No chief complaint on file.   denies for any new complaint, no nausea or vomiting, no abdominal pain.     Present on Admission:  **None**      Brief Synopsis of Hospital Course/HPI    Ms. Caldera is a 64 y.o.  presents to [facility] underwent repair of incision hernia with OPEN INCISIONAL HERNIA REPAIR BILATERAL COMPONENT RELEASE WITH MESH. we were asked to see the patient in consult post op for medical management of hypertension, GERD and other chronic medical issues. Patient denies for any chest pain or any short of breath. .         Date::          ROS      Objective   Objective      Vital Signs  Temp:  [98 °F (36.7 °C)-98.7 °F (37.1 °C)] 98.7 °F (37.1 °C)  Heart Rate:  [] 80  Resp:  [12-20] 18  BP: ()/(64-87) 98/64  Oxygen Therapy  SpO2: 94 %  Pulse Oximetry Type: Continuous  Device (Oxygen Therapy): room air  Flow (L/min): 2  Flowsheet Rows      First Filed Value   Admission Height  162.6 cm (64\") Documented at 12/02/2019 0617   Admission Weight  108 kg (237 lb 3.4 oz) Documented at 12/02/2019 0617        Intake & Output (last 3 days)       12/01 0701 - 12/02 0700 12/02 0701 - 12/03 0700 12/03 0701 - 12/04 0700    P.O.  490 840    I.V. (mL/kg)  2500 (23.1)     Total Intake(mL/kg)  2990 (27.7) 840 (7.8)    Urine (mL/kg/hr)  1175 (0.5) 1500 (0.9)    Drains  510 62.5    Blood  200     Total Output  1885 1562.5    Net  +1105 -722.5               Lines, Drains & Airways    Active LDAs     Name:   Placement date:   Placement time:   Site:   Days:    Peripheral IV 12/02/19 0640 Left;Posterior Hand   12/02/19 0640    Hand   1    Closed/Suction Drain 1 " Anterior Abdomen Bulb 19 Fr.   12/02/19    0859    Abdomen   1    Closed/Suction Drain 2 Anterior;Right Abdomen Bulb 15 Fr.   12/02/19    0900    Abdomen   1    Closed/Suction Drain 3 Anterior;Left Abdomen Bulb 15 Fr.   12/02/19    1017    Abdomen   1                  Physical Exam:    Physical Exam   Constitutional: She is oriented to person, place, and time. She appears well-developed and well-nourished. No distress.   HENT:   Head: Normocephalic and atraumatic.   Right Ear: External ear normal.   Left Ear: External ear normal.   Nose: Nose normal.   Mouth/Throat: Oropharynx is clear and moist. No oropharyngeal exudate.   Eyes: Conjunctivae and EOM are normal. Pupils are equal, round, and reactive to light. Right eye exhibits no discharge. Left eye exhibits no discharge. No scleral icterus.   Neck: Normal range of motion. No JVD present. No tracheal deviation present. No thyromegaly present.   Cardiovascular: Normal rate, regular rhythm, normal heart sounds and intact distal pulses. Exam reveals no gallop and no friction rub.   No murmur heard.  Pulmonary/Chest: Effort normal and breath sounds normal. No stridor. No respiratory distress. She has no wheezes. She has no rales. She exhibits no tenderness.   Abdominal: Soft. Bowel sounds are normal. She exhibits no distension and no mass. There is no tenderness. There is no rebound and no guarding. No hernia.   Musculoskeletal: Normal range of motion. She exhibits no edema, tenderness or deformity.   Lymphadenopathy:     She has no cervical adenopathy.   Neurological: She is alert and oriented to person, place, and time. No cranial nerve deficit or sensory deficit. She exhibits normal muscle tone. Coordination normal.   Skin: Skin is warm and dry. No rash noted. She is not diaphoretic. No erythema.   Psychiatric: She has a normal mood and affect. Her behavior is normal.   Nursing note and vitals reviewed.        Procedures:    Procedure(s):  OPEN INCISIONAL HERNIA  REPAIR BILATERAL COMPONENT RELEASE WITH MESH.          Results Review:     I reviewed the patient's new clinical results.      Lab Results (last 24 hours)     Procedure Component Value Units Date/Time    Tissue Pathology Exam [[id number]] Collected:  12/02/19 0804    Specimen:  Tissue from Hernia, Sac Updated:  12/03/19 0627    Phosphorus [[id number]]  (Abnormal) Collected:  12/03/19 0333    Specimen:  Blood Updated:  12/03/19 0449     Phosphorus 1.8 mg/dL     Comprehensive Metabolic Panel [[id number]]  (Abnormal) Collected:  12/03/19 0333    Specimen:  Blood Updated:  12/03/19 0443     Glucose 136 mg/dL      BUN 17 mg/dL      Creatinine 0.78 mg/dL      Sodium 136 mmol/L      Potassium 3.7 mmol/L      Chloride 103 mmol/L      CO2 24.0 mmol/L      Calcium 9.8 mg/dL      Total Protein 6.5 g/dL      Albumin 3.60 g/dL      ALT (SGPT) 18 U/L      AST (SGOT) 17 U/L      Alkaline Phosphatase 116 U/L      Total Bilirubin 0.7 mg/dL      eGFR Non African Amer 75 mL/min/1.73      Globulin 2.9 gm/dL      A/G Ratio 1.2 g/dL      BUN/Creatinine Ratio 21.8     Anion Gap 9.0 mmol/L     Narrative:       GFR Normal >60  Chronic Kidney Disease <60  Kidney Failure <15    Magnesium [[id number]]  (Normal) Collected:  12/03/19 0333    Specimen:  Blood Updated:  12/03/19 0443     Magnesium 1.9 mg/dL     CBC & Differential [[id number]] Collected:  12/03/19 0333    Specimen:  Blood Updated:  12/03/19 0434    Narrative:       The following orders were created for panel order CBC & Differential.  Procedure                               Abnormality         Status                     ---------                               -----------         ------                     CBC Auto Differential[[id number]]        Abnormal            Final result                 Please view results for these tests on the individual orders.    CBC Auto Differential [[id number]]  (Abnormal) Collected:  12/03/19 0333    Specimen:  Blood Updated:  12/03/19 0434     WBC 10.20  10*3/mm3      RBC 4.58 10*6/mm3      Hemoglobin 13.7 g/dL      Hematocrit 40.1 %      MCV 87.5 fL      MCH 29.9 pg      MCHC 34.2 g/dL      RDW 14.8 %      RDW-SD 45.5 fl      MPV 7.7 fL      Platelets 243 10*3/mm3      Neutrophil % 80.3 %      Lymphocyte % 9.0 %      Monocyte % 9.7 %      Eosinophil % 0.7 %      Basophil % 0.3 %      Neutrophils, Absolute 8.20 10*3/mm3      Lymphocytes, Absolute 0.90 10*3/mm3      Monocytes, Absolute 1.00 10*3/mm3      Eosinophils, Absolute 0.10 10*3/mm3      Basophils, Absolute 0.00 10*3/mm3      nRBC 0.0 /100 WBC         No results found for: HGBA1C                Microbiology Results (last 10 days)     ** No results found for the last 240 hours. **          ECG/EMG Results (most recent)     None                    No radiology results for the last 7 days    Xrays, labs reviewed personally by physician.    Medication Review:   I have reviewed the patient's current medication list      Scheduled Meds    ceFAZolin 1 g Intravenous Q8H   montelukast 10 mg Oral Daily   pantoprazole 40 mg Oral Q AM   ramipril 20 mg Oral QAM       Meds Infusions    sodium chloride 100 mL/hr Last Rate: 100 mL/hr (12/02/19 1240)   sodium chloride 100 mL/hr Last Rate: 100 mL/hr (12/03/19 0815)       Meds PRN  •  aluminum-magnesium hydroxide-simethicone  •  HYDROcodone-acetaminophen  •  HYDROmorphone **AND** naloxone  •  Morphine **AND** naloxone  •  ondansetron **OR** ondansetron  •  oxyCODONE  •  promethazine **OR** promethazine        Assessment/Plan   Assessment/Plan     Active Hospital Problems:  No notes have been filed under this hospital service.  Service: Hospitalist          Resolved Hospital Problems:  No notes have been filed under this hospital service.  Service: Hospitalist    Assessment and Plan.    Status post OPEN INCISIONAL HERNIA REPAIR BILATERAL COMPONENT RELEASE WITH MESH, post op care as per primary, advance diet as per primary     Hypertension ..... Continue Ramipril, monitor vitals  ... Reviewed.     GERD ..... Continue protonix, monitor for symptoms.     Seasonal allergy .... Noted on singular     Dvt prophylaxis with SCD.          VTE Prophylaxis - SCDs.      Code Status -   Code Status and Medical Interventions:   Ordered at: 12/02/19 1154     Code Status:    CPR     Medical Interventions (Level of Support Prior to Arrest):    Full       Discharge Planning    Destination      No service coordination in this encounter.      Durable Medical Equipment      No service coordination in this encounter.      Dialysis/Infusion      No service coordination in this encounter.      Home Medical Care      No service coordination in this encounter.      Therapy      No service coordination in this encounter.      Community Resources      No service coordination in this encounter.            Electronically signed by Hayden Mcdowell MD, 12/03/19, 9:43 PM.  [facility] Hospitalist Team

## 2019-12-04 NOTE — PROGRESS NOTES
LOS: 2 days   Patient Care Team:  Maegan Mohr MD as PCP - General (Family Medicine)    Reason for follow-up: Postop    Subjective   Patient seen and examined.  Only complaint is soreness.  No flatus yet.  She is belching.    Objective   Visions clean dry and intact without infection.  Drain tubes with appropriate color and output    Vital Signs  Vitals:    12/03/19 1857 12/03/19 2243 12/04/19 0300 12/04/19 0500   BP: 98/64 105/71 117/77 144/85   BP Location: Right arm   Right arm   Patient Position: Lying   Sitting   Pulse: 80 85 85 96   Resp: 18 17 16 18   Temp: 98.7 °F (37.1 °C) 99.2 °F (37.3 °C) 98.5 °F (36.9 °C) 98.2 °F (36.8 °C)   TempSrc: Oral Oral  Oral   SpO2: 94% 95% 95% 93%   Weight:       Height:             Results Review:       Lab Results (last 24 hours)     ** No results found for the last 24 hours. **           Imaging Results (Last 24 Hours)     ** No results found for the last 24 hours. **          Medication Review:   Current Facility-Administered Medications:   •  aluminum-magnesium hydroxide-simethicone (MAALOX MAX) 400-400-40 MG/5ML suspension 15 mL, 15 mL, Oral, Q4H PRN, Tomas Alamo, DO, 15 mL at 12/02/19 1906  •  HYDROcodone-acetaminophen (NORCO) 7.5-325 MG per tablet 1 tablet, 1 tablet, Oral, Q4H PRN, Tomas Alamo, DO, 1 tablet at 12/04/19 0934  •  HYDROmorphone (DILAUDID) injection 0.5 mg, 0.5 mg, Intravenous, Q2H PRN, 0.5 mg at 12/02/19 1504 **AND** naloxone (NARCAN) injection 0.1 mg, 0.1 mg, Intravenous, Q5 Min PRN, Tomas Alamo, DO  •  montelukast (SINGULAIR) tablet 10 mg, 10 mg, Oral, Daily, Tomas Alamo, DO, 10 mg at 12/04/19 0747  •  Morphine sulfate (PF) injection 4 mg, 4 mg, Intravenous, Q2H PRN **AND** naloxone (NARCAN) injection 0.4 mg, 0.4 mg, Intravenous, Q5 Min PRN, Tomas Alamo, DO  •  ondansetron (ZOFRAN) tablet 4 mg, 4 mg, Oral, Q6H PRN **OR** ondansetron (ZOFRAN) injection 4 mg, 4 mg, Intravenous, Q6H PRN, Tomas Alamo, DO  •   oxyCODONE (ROXICODONE) immediate release tablet 10 mg, 10 mg, Oral, Q4H PRN, Tomas Alamo DO, 10 mg at 12/02/19 1906  •  pantoprazole (PROTONIX) EC tablet 40 mg, 40 mg, Oral, Q AM, Tomas Alamo DO, 40 mg at 12/04/19 0500  •  promethazine (PHENERGAN) IVPB 12.5 mg, 12.5 mg, Intravenous, Q6H PRN **OR** promethazine (PHENERGAN) injection 12.5 mg, 12.5 mg, Intramuscular, Q6H PRN, Tomas Alamo DO  •  ramipril (ALTACE) capsule 20 mg, 20 mg, Oral, QAM, Tomas Alamo DO, 20 mg at 12/04/19 0500  •  sodium chloride 0.9 % infusion, 100 mL/hr, Intravenous, Continuous, Tomas Alamo DO, Last Rate: 100 mL/hr at 12/02/19 1240, 100 mL/hr at 12/02/19 1240  •  sodium chloride 0.9 % infusion, 100 mL/hr, Intravenous, Continuous, Tomas Alamo DO, Last Rate: 100 mL/hr at 12/03/19 0815, 100 mL/hr at 12/03/19 0815    Assessment/Plan         Incisional hernia      Impression: Postop day #2 open incisional hernia repair with bilateral component release and mesh placement    Plan: MiraLAX , will start Relistor as well, continue frequent ambulation and spirometry use.  Continue antibiotics      Tomas Alamo DO  12/04/19  9:59 AM

## 2019-12-05 PROCEDURE — 25010000002 METHYLNALTREXONE 12 MG/0.6ML SOLUTION: Performed by: SURGERY

## 2019-12-05 PROCEDURE — 99232 SBSQ HOSP IP/OBS MODERATE 35: CPT | Performed by: HOSPITALIST

## 2019-12-05 RX ORDER — OXYCODONE AND ACETAMINOPHEN 10; 325 MG/1; MG/1
1 TABLET ORAL EVERY 4 HOURS PRN
Qty: 40 TABLET | Refills: 0 | Status: SHIPPED | OUTPATIENT
Start: 2019-12-05 | End: 2019-12-31

## 2019-12-05 RX ORDER — SULFAMETHOXAZOLE AND TRIMETHOPRIM 800; 160 MG/1; MG/1
1 TABLET ORAL 2 TIMES DAILY
Qty: 14 TABLET | Refills: 0 | Status: SHIPPED | OUTPATIENT
Start: 2019-12-05 | End: 2020-01-21

## 2019-12-05 RX ADMIN — ALUMINUM HYDROXIDE, MAGNESIUM HYDROXIDE, AND DIMETHICONE 15 ML: 400; 400; 40 SUSPENSION ORAL at 03:50

## 2019-12-05 RX ADMIN — HYDROCODONE BITARTRATE AND ACETAMINOPHEN 1 TABLET: 7.5; 325 TABLET ORAL at 03:50

## 2019-12-05 RX ADMIN — RAMIPRIL 20 MG: 5 CAPSULE ORAL at 05:40

## 2019-12-05 RX ADMIN — MONTELUKAST SODIUM 10 MG: 10 TABLET, COATED ORAL at 09:19

## 2019-12-05 RX ADMIN — ALUMINUM HYDROXIDE, MAGNESIUM HYDROXIDE, AND DIMETHICONE 15 ML: 400; 400; 40 SUSPENSION ORAL at 07:58

## 2019-12-05 RX ADMIN — PANTOPRAZOLE SODIUM 40 MG: 40 TABLET, DELAYED RELEASE ORAL at 05:40

## 2019-12-05 RX ADMIN — METHYLNALTREXONE BROMIDE 12 MG: 12 INJECTION, SOLUTION SUBCUTANEOUS at 09:19

## 2019-12-05 RX ADMIN — OXYCODONE HYDROCHLORIDE 10 MG: 5 TABLET ORAL at 20:21

## 2019-12-05 RX ADMIN — OXYCODONE HYDROCHLORIDE 10 MG: 5 TABLET ORAL at 13:29

## 2019-12-05 NOTE — PAYOR COMM NOTE
"Clinical UD for Sahil Caldera  1955  Ref #[id number]     NOTE:  MD has not rounded at this time.  Notes from  @ 1534    AUTHORIZATION PENDING -  PLEASE FORWARD DETERMINATION TO FOLLOWING CONTACT:    SANDEEP ARMIJO LPN [facility]    [phone]    Sahil Caldera (64 y.o. Female)     Date of Birth Social Security Number Address Home Phone MRN    1955  [address] [place] [address] [phone] [id number]    Christianity Marital Status          Unknown        Admission Date Admission Type Admitting Provider Attending Provider Department, Room/Bed    19 Elective Tomas Alamo, Tomas Vásquez DO [facility] SURGICAL INPATIENT,     Discharge Date Discharge Disposition Discharge Destination                       Attending Provider:  Tomas Alamo DO    Allergies:  No Known Allergies    Isolation:  None   Infection:  None   Code Status:  CPR    Ht:  162.6 cm (64\")   Wt:  108 kg (237 lb 3.4 oz)    Admission Cmt:  None   Principal Problem:  Incisional hernia [K43.2] More...                 Active Insurance as of 2019     Primary Coverage     Payor Plan Insurance Group Employer/Plan Group    [email or web address] [facility] [email or web address] BLUE [employer] PPO [id number]     Payor Plan Address Payor Plan Phone Number Payor Plan Fax Number Effective Dates    [address] [phone]  2019 - None Entered    [place] [address]       Subscriber Name Subscriber Birth Date Member ID       SAHIL CALDERA 1955 [id number]                 Emergency Contacts      (Rel.) Home Phone Work Phone Mobile Phone    EVIESERAFIN \"LOUISA\" (Spouse) -- -- [phone]    JermaineMai rosales (Daughter) -- -- --               Physician Progress Notes (last 48 hours) (Notes from 19 1407 through 19 1407)      Hayden Mcdowell MD at 19 1530                [facility] Medicine Services Daily Progress Note      Hospitalist " "Team  LOS 2 days      Patient Care Team:  Maegan Mohr MD as PCP - General (Family Medicine)    Patient Location: 4112/1      Subjective   Subjective   Says still not passing gas,     Chief Complaint / Subjective  Says not passing gas yet, no bowel movement, still has hypoactive bowel movement, denies for any chest pain, no nausea or vomiting.     Present on Admission:  **None**      Brief Synopsis of Hospital Course/HPI    Ms. Caldera is a 64 y.o.  [facility] underwent repair of incision hernia with OPEN INCISIONAL HERNIA REPAIR BILATERAL COMPONENT RELEASE WITH MESH. we were asked to see the patient in consult post op for medical management of hypertension, GERD and other chronic medical issues. Patient denies for any chest pain or any short of breath.          Date::          ROS      Objective   Objective      Vital Signs  Temp:  [98.2 °F (36.8 °C)-99.2 °F (37.3 °C)] 98.5 °F (36.9 °C)  Heart Rate:  [79-96] 94  Resp:  [12-18] 13  BP: ()/(64-85) 106/69  Oxygen Therapy  SpO2: 95 %  Pulse Oximetry Type: Intermittent  Device (Oxygen Therapy): room air  Flow (L/min): 2  Flowsheet Rows      First Filed Value   Admission Height  162.6 cm (64\") Documented at 12/02/2019 0617   Admission Weight  108 kg (237 lb 3.4 oz) Documented at 12/02/2019 0617        Intake & Output (last 3 days)       12/01 0701 - 12/02 0700 12/02 0701 - 12/03 0700 12/03 0701 - 12/04 0700 12/04 0701 - 12/05 0700    P.O.  490 1300 900    I.V. (mL/kg)  2500 (23.1)      Total Intake(mL/kg)  2990 (27.7) 1300 (12) 900 (8.3)    Urine (mL/kg/hr)  1175 (0.5) 2100 (0.8) 1400 (1.5)    Drains  510 185 150    Blood  200      Total Output  1885 2285 1550    Net  +1105 -985 -650                Lines, Drains & Airways    Active LDAs     Name:   Placement date:   Placement time:   Site:   Days:    Peripheral IV 12/02/19 0640 Left;Posterior Hand   12/02/19    0640    Hand   1    Closed/Suction Drain 1 Anterior Abdomen Bulb 19 " Fr.   12/02/19    0859    Abdomen   1    Closed/Suction Drain 2 Anterior;Right Abdomen Bulb 15 Fr.   12/02/19    0900    Abdomen   1    Closed/Suction Drain 3 Anterior;Left Abdomen Bulb 15 Fr.   12/02/19    1017    Abdomen   1                  Physical Exam:    Physical Exam   Constitutional: She is oriented to person, place, and time. She appears well-developed and well-nourished. No distress.   HENT:   Head: Normocephalic and atraumatic.   Right Ear: External ear normal.   Left Ear: External ear normal.   Nose: Nose normal.   Mouth/Throat: Oropharynx is clear and moist. No oropharyngeal exudate.   Eyes: Conjunctivae and EOM are normal. Pupils are equal, round, and reactive to light. Right eye exhibits no discharge. Left eye exhibits no discharge. No scleral icterus.   Neck: Normal range of motion. No JVD present. No tracheal deviation present. No thyromegaly present.   Cardiovascular: Normal rate, regular rhythm, normal heart sounds and intact distal pulses. Exam reveals no gallop and no friction rub.   No murmur heard.  Pulmonary/Chest: Effort normal and breath sounds normal. No stridor. No respiratory distress. She has no wheezes. She has no rales. She exhibits no tenderness.   Abdominal: Soft. Bowel sounds are normal. She exhibits no distension and no mass. There is no tenderness. There is no rebound and no guarding. No hernia.   Musculoskeletal: Normal range of motion. She exhibits no edema, tenderness or deformity.   Lymphadenopathy:     She has no cervical adenopathy.   Neurological: She is alert and oriented to person, place, and time. No cranial nerve deficit or sensory deficit. She exhibits normal muscle tone. Coordination normal.   Skin: Skin is warm and dry. No rash noted. She is not diaphoretic. No erythema.   Psychiatric: She has a normal mood and affect. Her behavior is normal.   Nursing note and vitals reviewed.        Procedures:    Procedure(s):  OPEN INCISIONAL HERNIA REPAIR BILATERAL COMPONENT  "RELEASE WITH MESH.          Results Review:     I reviewed the patient's new clinical results.      Lab Results (last 24 hours)     Procedure Component Value Units Date/Time    Tissue Pathology Exam [[id number]] Collected:  12/02/19 0804    Specimen:  Tissue from Hernia, Sac Updated:  12/04/19 1325     Case Report --     Surgical Pathology Report                         Case: [id number]                                  Authorizing Provider:  Tomas Alamo DO        Collected:           12/02/2019 08:04 AM          Ordering Location:     [facility] MAIN  Received:            12/03/2019 06:27 AM                                 OR                                                                           Pathologist:           Devan Fernández MD                                                            Specimen:    Hernia, Sac, HERNIA SAC                                                                     Final Diagnosis --     Soft tissue, site not specified, herniorrhaphy:    Benign mesothelial lined fibrovascular and adipose tissue consistent with clinical history    DEBRA/tkd        Gross Description --     Received in formalin designated \"Hernia sac\" are two unoriented portion of yellow to pink fibrofatty and membranous tissue measuring 12 x 5.3 x 2.2 cm in aggregate. Sectioning reveals generally thin walls. No nodules or masses are identified. Representative sections of fibrofatty tissue are submitted in one cassette.     DEBRA/tkd            No results found for: HGBA1C                Microbiology Results (last 10 days)     ** No results found for the last 240 hours. **          ECG/EMG Results (most recent)     None                    No radiology results for the last 7 days    Xrays, labs reviewed personally by physician.    Medication Review:   I have reviewed the patient's current medication list      Scheduled Meds    methylnaltrexone 12 mg Subcutaneous Daily   montelukast 10 mg Oral Daily "   pantoprazole 40 mg Oral Q AM   ramipril 20 mg Oral QAM       Meds Infusions    sodium chloride 100 mL/hr Last Rate: 100 mL/hr (12/02/19 1240)   sodium chloride 100 mL/hr Last Rate: 100 mL/hr (12/03/19 0815)       Meds PRN  •  aluminum-magnesium hydroxide-simethicone  •  HYDROcodone-acetaminophen  •  HYDROmorphone **AND** naloxone  •  Morphine **AND** naloxone  •  ondansetron **OR** ondansetron  •  oxyCODONE  •  promethazine **OR** promethazine        Assessment/Plan   Assessment/Plan     Active Hospital Problems:  No notes have been filed under this hospital service.  Service: Hospitalist          Resolved Hospital Problems:  No notes have been filed under this hospital service.  Service: Hospitalist    Assessment and Plan.    Status post OPEN INCISIONAL HERNIA REPAIR BILATERAL COMPONENT RELEASE WITH MESH, post op care as per primary, advance diet as per primary, labs reviewed.      Hypertension vital reviewed. .... Continue Ramipril, monitor vitals ... Reviewed.     GERD ..... Continue protonix, monitor for symptoms.     Seasonal allergy .... Noted on singular     Dvt prophylaxis with SCD.          VTE Prophylaxis - SCDs.      Code Status -   Code Status and Medical Interventions:   Ordered at: 12/02/19 1154     Code Status:    CPR     Medical Interventions (Level of Support Prior to Arrest):    Full       Discharge Planning    Destination      No service coordination in this encounter.      Durable Medical Equipment      No service coordination in this encounter.      Dialysis/Infusion      No service coordination in this encounter.      Home Medical Care      No service coordination in this encounter.      Therapy      No service coordination in this encounter.      Community Resources      No service coordination in this encounter.            Electronically signed by Hayden Mcdowell MD, 12/04/19, 3:36 PM.  [facility] Hospitalist Team        Electronically signed by Hayden Mcdowell MD at 12/04/19  1539     Tomas Alamo DO at 12/04/19 0959             LOS: 2 days   Patient Care Team:  Maegan Mohr MD as PCP - General (Family Medicine)    Reason for follow-up: Postop    Subjective   Patient seen and examined.  Only complaint is soreness.  No flatus yet.  She is belching.    Objective   Visions clean dry and intact without infection.  Drain tubes with appropriate color and output    Vital Signs  Vitals:    12/03/19 1857 12/03/19 2243 12/04/19 0300 12/04/19 0500   BP: 98/64 105/71 117/77 144/85   BP Location: Right arm   Right arm   Patient Position: Lying   Sitting   Pulse: 80 85 85 96   Resp: 18 17 16 18   Temp: 98.7 °F (37.1 °C) 99.2 °F (37.3 °C) 98.5 °F (36.9 °C) 98.2 °F (36.8 °C)   TempSrc: Oral Oral  Oral   SpO2: 94% 95% 95% 93%   Weight:       Height:             Results Review:       Lab Results (last 24 hours)     ** No results found for the last 24 hours. **           Imaging Results (Last 24 Hours)     ** No results found for the last 24 hours. **          Medication Review:   Current Facility-Administered Medications:   •  aluminum-magnesium hydroxide-simethicone (MAALOX MAX) 400-400-40 MG/5ML suspension 15 mL, 15 mL, Oral, Q4H PRN, Tomas Alamo, , 15 mL at 12/02/19 1906  •  HYDROcodone-acetaminophen (NORCO) 7.5-325 MG per tablet 1 tablet, 1 tablet, Oral, Q4H PRN, Tomas Alamo DO, 1 tablet at 12/04/19 0934  •  HYDROmorphone (DILAUDID) injection 0.5 mg, 0.5 mg, Intravenous, Q2H PRN, 0.5 mg at 12/02/19 1504 **AND** naloxone (NARCAN) injection 0.1 mg, 0.1 mg, Intravenous, Q5 Min PRN, Tomas Alamo, DO  •  montelukast (SINGULAIR) tablet 10 mg, 10 mg, Oral, Daily, Tomas Alamo DO, 10 mg at 12/04/19 0747  •  Morphine sulfate (PF) injection 4 mg, 4 mg, Intravenous, Q2H PRN **AND** naloxone (NARCAN) injection 0.4 mg, 0.4 mg, Intravenous, Q5 Min PRN, Tomas Alamo, DO  •  ondansetron (ZOFRAN) tablet 4 mg, 4 mg, Oral, Q6H PRN **OR** ondansetron (ZOFRAN) injection 4 mg, 4 mg,  Intravenous, Q6H PRN, Tomas Alamo DO  •  oxyCODONE (ROXICODONE) immediate release tablet 10 mg, 10 mg, Oral, Q4H PRN, Tomas Alamo DO, 10 mg at 12/02/19 1906  •  pantoprazole (PROTONIX) EC tablet 40 mg, 40 mg, Oral, Q AM, Tomas Alamo DO, 40 mg at 12/04/19 0500  •  promethazine (PHENERGAN) IVPB 12.5 mg, 12.5 mg, Intravenous, Q6H PRN **OR** promethazine (PHENERGAN) injection 12.5 mg, 12.5 mg, Intramuscular, Q6H PRN, Tomas Alamo DO  •  ramipril (ALTACE) capsule 20 mg, 20 mg, Oral, QAM, Tomas Alamo DO, 20 mg at 12/04/19 0500  •  sodium chloride 0.9 % infusion, 100 mL/hr, Intravenous, Continuous, Tomas Alamo DO, Last Rate: 100 mL/hr at 12/02/19 1240, 100 mL/hr at 12/02/19 1240  •  sodium chloride 0.9 % infusion, 100 mL/hr, Intravenous, Continuous, Tomas Alamo DO, Last Rate: 100 mL/hr at 12/03/19 0815, 100 mL/hr at 12/03/19 0815    Assessment/Plan         Incisional hernia      Impression: Postop day #2 open incisional hernia repair with bilateral component release and mesh placement    Plan: MiraLAX , will start Relistor as well, continue frequent ambulation and spirometry use.  Continue antibiotics      Tomas Alamo DO  12/04/19  9:59 AM            Electronically signed by Tomas Alamo DO at 12/04/19 1002     Hayden Mcdowell MD at 12/03/19 2143                [facility] Medicine Services Daily Progress Note      Hospitalist Team  LOS 1 days      Patient Care Team:  Maegan Mohr MD as PCP - General (Family Medicine)    Patient Location: 4112/1      Subjective   Subjective   No chest pain, no nausea or vomiting, no abdominal pain.    Chief Complaint / Subjective  No chief complaint on file.   denies for any new complaint, no nausea or vomiting, no abdominal pain.     Present on Admission:  **None**      Brief Synopsis of Hospital Course/HPI    Ms. Caldera is a 64 y.o.  presents to [facility] underwent repair of incision hernia with  "OPEN INCISIONAL HERNIA REPAIR BILATERAL COMPONENT RELEASE WITH MESH. we were asked to see the patient in consult post op for medical management of hypertension, GERD and other chronic medical issues. Patient denies for any chest pain or any short of breath. .         Date::          ROS      Objective   Objective      Vital Signs  Temp:  [98 °F (36.7 °C)-98.7 °F (37.1 °C)] 98.7 °F (37.1 °C)  Heart Rate:  [] 80  Resp:  [12-20] 18  BP: ()/(64-87) 98/64  Oxygen Therapy  SpO2: 94 %  Pulse Oximetry Type: Continuous  Device (Oxygen Therapy): room air  Flow (L/min): 2  Flowsheet Rows      First Filed Value   Admission Height  162.6 cm (64\") Documented at 12/02/2019 0617   Admission Weight  108 kg (237 lb 3.4 oz) Documented at 12/02/2019 0617        Intake & Output (last 3 days)       12/01 0701 - 12/02 0700 12/02 0701 - 12/03 0700 12/03 0701 - 12/04 0700    P.O.  490 840    I.V. (mL/kg)  2500 (23.1)     Total Intake(mL/kg)  2990 (27.7) 840 (7.8)    Urine (mL/kg/hr)  1175 (0.5) 1500 (0.9)    Drains  510 62.5    Blood  200     Total Output  1885 1562.5    Net  +1105 -722.5               Lines, Drains & Airways    Active LDAs     Name:   Placement date:   Placement time:   Site:   Days:    Peripheral IV 12/02/19 0640 Left;Posterior Hand   12/02/19    0640    Hand   1    Closed/Suction Drain 1 Anterior Abdomen Bulb 19 Fr.   12/02/19    0859    Abdomen   1    Closed/Suction Drain 2 Anterior;Right Abdomen Bulb 15 Fr.   12/02/19    0900    Abdomen   1    Closed/Suction Drain 3 Anterior;Left Abdomen Bulb 15 Fr.   12/02/19    1017    Abdomen   1                  Physical Exam:    Physical Exam   Constitutional: She is oriented to person, place, and time. She appears well-developed and well-nourished. No distress.   HENT:   Head: Normocephalic and atraumatic.   Right Ear: External ear normal.   Left Ear: External ear normal.   Nose: Nose normal.   Mouth/Throat: Oropharynx is clear and moist. No oropharyngeal exudate. "   Eyes: Conjunctivae and EOM are normal. Pupils are equal, round, and reactive to light. Right eye exhibits no discharge. Left eye exhibits no discharge. No scleral icterus.   Neck: Normal range of motion. No JVD present. No tracheal deviation present. No thyromegaly present.   Cardiovascular: Normal rate, regular rhythm, normal heart sounds and intact distal pulses. Exam reveals no gallop and no friction rub.   No murmur heard.  Pulmonary/Chest: Effort normal and breath sounds normal. No stridor. No respiratory distress. She has no wheezes. She has no rales. She exhibits no tenderness.   Abdominal: Soft. Bowel sounds are normal. She exhibits no distension and no mass. There is no tenderness. There is no rebound and no guarding. No hernia.   Musculoskeletal: Normal range of motion. She exhibits no edema, tenderness or deformity.   Lymphadenopathy:     She has no cervical adenopathy.   Neurological: She is alert and oriented to person, place, and time. No cranial nerve deficit or sensory deficit. She exhibits normal muscle tone. Coordination normal.   Skin: Skin is warm and dry. No rash noted. She is not diaphoretic. No erythema.   Psychiatric: She has a normal mood and affect. Her behavior is normal.   Nursing note and vitals reviewed.        Procedures:    Procedure(s):  OPEN INCISIONAL HERNIA REPAIR BILATERAL COMPONENT RELEASE WITH MESH.          Results Review:     I reviewed the patient's new clinical results.      Lab Results (last 24 hours)     Procedure Component Value Units Date/Time    Tissue Pathology Exam [[id number]] Collected:  12/02/19 0804    Specimen:  Tissue from Hernia, Sac Updated:  12/03/19 0627    Phosphorus [[id number]]  (Abnormal) Collected:  12/03/19 0333    Specimen:  Blood Updated:  12/03/19 0449     Phosphorus 1.8 mg/dL     Comprehensive Metabolic Panel [[id number]]  (Abnormal) Collected:  12/03/19 0333    Specimen:  Blood Updated:  12/03/19 0443     Glucose 136 mg/dL      BUN 17 mg/dL       Creatinine 0.78 mg/dL      Sodium 136 mmol/L      Potassium 3.7 mmol/L      Chloride 103 mmol/L      CO2 24.0 mmol/L      Calcium 9.8 mg/dL      Total Protein 6.5 g/dL      Albumin 3.60 g/dL      ALT (SGPT) 18 U/L      AST (SGOT) 17 U/L      Alkaline Phosphatase 116 U/L      Total Bilirubin 0.7 mg/dL      eGFR Non African Amer 75 mL/min/1.73      Globulin 2.9 gm/dL      A/G Ratio 1.2 g/dL      BUN/Creatinine Ratio 21.8     Anion Gap 9.0 mmol/L     Narrative:       GFR Normal >60  Chronic Kidney Disease <60  Kidney Failure <15    Magnesium [[id number]]  (Normal) Collected:  12/03/19 0333    Specimen:  Blood Updated:  12/03/19 0443     Magnesium 1.9 mg/dL     CBC & Differential [[id number]] Collected:  12/03/19 0333    Specimen:  Blood Updated:  12/03/19 0434    Narrative:       The following orders were created for panel order CBC & Differential.  Procedure                               Abnormality         Status                     ---------                               -----------         ------                     CBC Auto Differential[[id number]]        Abnormal            Final result                 Please view results for these tests on the individual orders.    CBC Auto Differential [[id number]]  (Abnormal) Collected:  12/03/19 0333    Specimen:  Blood Updated:  12/03/19 0434     WBC 10.20 10*3/mm3      RBC 4.58 10*6/mm3      Hemoglobin 13.7 g/dL      Hematocrit 40.1 %      MCV 87.5 fL      MCH 29.9 pg      MCHC 34.2 g/dL      RDW 14.8 %      RDW-SD 45.5 fl      MPV 7.7 fL      Platelets 243 10*3/mm3      Neutrophil % 80.3 %      Lymphocyte % 9.0 %      Monocyte % 9.7 %      Eosinophil % 0.7 %      Basophil % 0.3 %      Neutrophils, Absolute 8.20 10*3/mm3      Lymphocytes, Absolute 0.90 10*3/mm3      Monocytes, Absolute 1.00 10*3/mm3      Eosinophils, Absolute 0.10 10*3/mm3      Basophils, Absolute 0.00 10*3/mm3      nRBC 0.0 /100 WBC         No results found for: HGBA1C                Microbiology Results (last  10 days)     ** No results found for the last 240 hours. **          ECG/EMG Results (most recent)     None                    No radiology results for the last 7 days    Xrays, labs reviewed personally by physician.    Medication Review:   I have reviewed the patient's current medication list      Scheduled Meds    ceFAZolin 1 g Intravenous Q8H   montelukast 10 mg Oral Daily   pantoprazole 40 mg Oral Q AM   ramipril 20 mg Oral QAM       Meds Infusions    sodium chloride 100 mL/hr Last Rate: 100 mL/hr (12/02/19 1240)   sodium chloride 100 mL/hr Last Rate: 100 mL/hr (12/03/19 0815)       Meds PRN  •  aluminum-magnesium hydroxide-simethicone  •  HYDROcodone-acetaminophen  •  HYDROmorphone **AND** naloxone  •  Morphine **AND** naloxone  •  ondansetron **OR** ondansetron  •  oxyCODONE  •  promethazine **OR** promethazine        Assessment/Plan   Assessment/Plan     Active Hospital Problems:  No notes have been filed under this hospital service.  Service: Hospitalist          Resolved Hospital Problems:  No notes have been filed under this hospital service.  Service: Hospitalist    Assessment and Plan.    Status post OPEN INCISIONAL HERNIA REPAIR BILATERAL COMPONENT RELEASE WITH MESH, post op care as per primary, advance diet as per primary     Hypertension ..... Continue Ramipril, monitor vitals ... Reviewed.     GERD ..... Continue protonix, monitor for symptoms.     Seasonal allergy .... Noted on singular     Dvt prophylaxis with SCD.          VTE Prophylaxis - SCDs.      Code Status -   Code Status and Medical Interventions:   Ordered at: 12/02/19 1154     Code Status:    CPR     Medical Interventions (Level of Support Prior to Arrest):    Full       Discharge Planning    Destination      No service coordination in this encounter.      Durable Medical Equipment      No service coordination in this encounter.      Dialysis/Infusion      No service coordination in this encounter.      Home Medical Care      No service  coordination in this encounter.      Therapy      No service coordination in this encounter.      Community Resources      No service coordination in this encounter.            Electronically signed by Hayden Mcdowell MD, 12/03/19, 9:43 PM.  [facility] Hospitalist Team        Electronically signed by Hayden Mcdowell MD at 12/03/19 9929

## 2019-12-05 NOTE — PROGRESS NOTES
Continued Stay Note  CRISTOPHER Trevino     Patient Name: Brianna Caldera  MRN: [id number]  Today's Date: 12/5/2019    Admit Date: 12/2/2019    Discharge Plan     Row Name 12/05/19 1409       Plan    Plan  Continue to anticipate routine discharge home with spouse.     Patient/Family in Agreement with Plan  yes    Plan Comments  Barriers to discharge: decrease bowel motility.           Expected Discharge Date and Time     Expected Discharge Date Expected Discharge Time    Dec 5, 2019             Anna Naegele RN CM   Phone [phone]  Fax   [phone]

## 2019-12-05 NOTE — PROGRESS NOTES
"      [facility] Medicine Services Daily Progress Note      Hospitalist Team  LOS 3 days      Patient Care Team:  Maegan Mohr MD as PCP - General (Family Medicine)    Patient Location: 4112/1      Subjective   Subjective        Chief Complaint / Subjective  Not passing gas, no bowel movement yet, denies for any other active complaint, no nausea or vomiting.     Present on Admission:  **None**      Brief Synopsis of Hospital Course/HPI    Ms. Caldera is a 64 y.o.  presents to [facility] underwent repair of incision hernia with OPEN INCISIONAL HERNIA REPAIR BILATERAL COMPONENT RELEASE WITH MESH. we were asked to see the patient in consult post op for medical management of hypertension, GERD and other chronic medical issues. Patient denies for any chest pain or any short of breath.          Date::          ROS      Objective   Objective      Vital Signs  Temp:  [98.2 °F (36.8 °C)-98.7 °F (37.1 °C)] 98.3 °F (36.8 °C)  Heart Rate:  [] 93  Resp:  [16-18] 18  BP: ()/(63-85) 95/66  Oxygen Therapy  SpO2: 91 %  Pulse Oximetry Type: Intermittent  Device (Oxygen Therapy): room air  Flow (L/min): 2  Flowsheet Rows      First Filed Value   Admission Height  162.6 cm (64\") Documented at 12/02/2019 0617   Admission Weight  108 kg (237 lb 3.4 oz) Documented at 12/02/2019 0617        Intake & Output (last 3 days)       12/02 0701 - 12/03 0700 12/03 0701 - 12/04 0700 12/04 0701 - 12/05 0700 12/05 0701 - 12/06 0700    P.O. 490 1300 1140 960    I.V. (mL/kg) 2500 (23.1)       Total Intake(mL/kg) 2990 (27.7) 1300 (12) 1140 (10.6) 960 (8.9)    Urine (mL/kg/hr) 1175 (0.5) 2100 (0.8) 2100 (0.8) 600 (0.5)    Drains 510 185 240 70    Stool    0    Blood 200       Total Output 1885 2285 2340 670    Net +1105 -985 -1200 +290            Stool Unmeasured Occurrence    1 x        Lines, Drains & Airways    Active LDAs     Name:   Placement date:   Placement time:   Site:   Days:    Peripheral " IV 12/02/19 0640 Left;Posterior Hand   12/02/19    0640    Hand   1    Closed/Suction Drain 1 Anterior Abdomen Bulb 19 Fr.   12/02/19    0859    Abdomen   1    Closed/Suction Drain 2 Anterior;Right Abdomen Bulb 15 Fr.   12/02/19    0900    Abdomen   1    Closed/Suction Drain 3 Anterior;Left Abdomen Bulb 15 Fr.   12/02/19    1017    Abdomen   1                  Physical Exam:    Physical Exam   Constitutional: She is oriented to person, place, and time. She appears well-developed and well-nourished. No distress.   HENT:   Head: Normocephalic and atraumatic.   Right Ear: External ear normal.   Left Ear: External ear normal.   Nose: Nose normal.   Mouth/Throat: Oropharynx is clear and moist. No oropharyngeal exudate.   Eyes: Conjunctivae and EOM are normal. Pupils are equal, round, and reactive to light. Right eye exhibits no discharge. Left eye exhibits no discharge. No scleral icterus.   Neck: Normal range of motion. No JVD present. No tracheal deviation present. No thyromegaly present.   Cardiovascular: Normal rate, regular rhythm, normal heart sounds and intact distal pulses. Exam reveals no gallop and no friction rub.   No murmur heard.  Pulmonary/Chest: Effort normal and breath sounds normal. No stridor. No respiratory distress. She has no wheezes. She has no rales. She exhibits no tenderness.   Abdominal: Soft. Bowel sounds are normal. She exhibits no distension and no mass. There is no tenderness. There is no rebound and no guarding. No hernia.   Musculoskeletal: Normal range of motion. She exhibits no edema, tenderness or deformity.   Lymphadenopathy:     She has no cervical adenopathy.   Neurological: She is alert and oriented to person, place, and time. No cranial nerve deficit or sensory deficit. She exhibits normal muscle tone. Coordination normal.   Skin: Skin is warm and dry. No rash noted. She is not diaphoretic. No erythema.   Psychiatric: She has a normal mood and affect. Her behavior is normal.    Nursing note and vitals reviewed.        Procedures:    Procedure(s):  OPEN INCISIONAL HERNIA REPAIR BILATERAL COMPONENT RELEASE WITH MESH.          Results Review:     I reviewed the patient's new clinical results.      Lab Results (last 24 hours)     ** No results found for the last 24 hours. **        No results found for: HGBA1C                Microbiology Results (last 10 days)     ** No results found for the last 240 hours. **          ECG/EMG Results (most recent)     None                    No radiology results for the last 7 days    Xrays, labs reviewed personally by physician.    Medication Review:   I have reviewed the patient's current medication list      Scheduled Meds    methylnaltrexone 12 mg Subcutaneous Daily   montelukast 10 mg Oral Daily   pantoprazole 40 mg Oral Q AM   ramipril 20 mg Oral QAM       Meds Infusions    sodium chloride 100 mL/hr Last Rate: 100 mL/hr (12/02/19 1240)   sodium chloride 100 mL/hr Last Rate: 100 mL/hr (12/03/19 0815)       Meds PRN  •  aluminum-magnesium hydroxide-simethicone  •  HYDROcodone-acetaminophen  •  HYDROmorphone **AND** naloxone  •  Morphine **AND** naloxone  •  ondansetron **OR** ondansetron  •  oxyCODONE  •  promethazine **OR** promethazine        Assessment/Plan   Assessment/Plan     Active Hospital Problems:  No notes have been filed under this hospital service.  Service: Hospitalist          Resolved Hospital Problems:  No notes have been filed under this hospital service.  Service: Hospitalist    Assessment and Plan.    Status post OPEN INCISIONAL HERNIA REPAIR BILATERAL COMPONENT RELEASE WITH MESH, post op care as per primary, advance diet as per primary, labs reviewed.      Hypertension vital reviewed. .... Continue Ramipril, monitor vitals ... Reviewed.    Mild acute kidney injury resolved now, avoid nephro toxic medication, monitor urine output.      GERD ..... Continue protonix, monitor for symptoms.     Seasonal allergy .... Noted on  singular     Dvt prophylaxis with SCD.          VTE Prophylaxis - SCDs.      Code Status -   Code Status and Medical Interventions:   Ordered at: 12/02/19 1154     Code Status:    CPR     Medical Interventions (Level of Support Prior to Arrest):    Full       Discharge Planning    Destination      No service coordination in this encounter.      Durable Medical Equipment      No service coordination in this encounter.      Dialysis/Infusion      No service coordination in this encounter.      Home Medical Care      No service coordination in this encounter.      Therapy      No service coordination in this encounter.      Community Resources      No service coordination in this encounter.            Electronically signed by Hayden Mcdowell MD, 12/05/19, 5:07 PM.  [facility] Hospitalist Team

## 2019-12-05 NOTE — PROGRESS NOTES
LOS: 3 days   Patient Care Team:  Maegan Mohr MD as PCP - General (Family Medicine)    Reason for follow-up: Postop    Subjective   Patient seen and examined.  She is getting up moving better.  Did have a bowel movement today.    Objective   Incision is clean dry and intact without infection.  Drain tubes are putting out appropriate color and volume    Vital Signs  Vitals:    12/04/19 1500 12/04/19 2300 12/05/19 0500 12/05/19 1500   BP: 106/69 122/85 95/63 95/66   BP Location:  Right arm     Patient Position:  Lying     Pulse: 94 105 95 93   Resp: 13 16 17 18   Temp: 98.5 °F (36.9 °C) 98.2 °F (36.8 °C) 98.7 °F (37.1 °C) 98.3 °F (36.8 °C)   TempSrc: Oral Oral  Oral   SpO2: 95% 94% 90% 91%   Weight:       Height:             Results Review:       Lab Results (last 24 hours)     ** No results found for the last 24 hours. **           Imaging Results (Last 24 Hours)     ** No results found for the last 24 hours. **          Medication Review:   Current Facility-Administered Medications:   •  aluminum-magnesium hydroxide-simethicone (MAALOX MAX) 400-400-40 MG/5ML suspension 15 mL, 15 mL, Oral, Q4H PRN, Tomas Alamo DO, 15 mL at 12/05/19 0758  •  HYDROcodone-acetaminophen (NORCO) 7.5-325 MG per tablet 1 tablet, 1 tablet, Oral, Q4H PRN, Tomas Alamo DO, 1 tablet at 12/05/19 0350  •  HYDROmorphone (DILAUDID) injection 0.5 mg, 0.5 mg, Intravenous, Q2H PRN, 0.5 mg at 12/02/19 1504 **AND** naloxone (NARCAN) injection 0.1 mg, 0.1 mg, Intravenous, Q5 Min PRN, Tomas Alamo DO  •  methylnaltrexone (RELISTOR) injection 12 mg, 12 mg, Subcutaneous, Daily, Tomas Alamo DO, 12 mg at 12/05/19 0919  •  montelukast (SINGULAIR) tablet 10 mg, 10 mg, Oral, Daily, Tomas Alamo DO, 10 mg at 12/05/19 0919  •  Morphine sulfate (PF) injection 4 mg, 4 mg, Intravenous, Q2H PRN **AND** naloxone (NARCAN) injection 0.4 mg, 0.4 mg, Intravenous, Q5 Min PRN, Tomas Alamo, DO  •  ondansetron (ZOFRAN) tablet 4 mg,  4 mg, Oral, Q6H PRN **OR** ondansetron (ZOFRAN) injection 4 mg, 4 mg, Intravenous, Q6H PRN, Tomas Alamo, DO, 4 mg at 12/04/19 1444  •  oxyCODONE (ROXICODONE) immediate release tablet 10 mg, 10 mg, Oral, Q4H PRN, Tomas Alamo, DO, 10 mg at 12/05/19 1329  •  pantoprazole (PROTONIX) EC tablet 40 mg, 40 mg, Oral, Q AM, Tomas Alamo, DO, 40 mg at 12/05/19 0540  •  promethazine (PHENERGAN) IVPB 12.5 mg, 12.5 mg, Intravenous, Q6H PRN, 12.5 mg at 12/04/19 1745 **OR** promethazine (PHENERGAN) injection 12.5 mg, 12.5 mg, Intramuscular, Q6H PRN, Tomas Alamo, DO  •  ramipril (ALTACE) capsule 20 mg, 20 mg, Oral, QAM, Tomas Alamo, DO, 20 mg at 12/05/19 0540  •  sodium chloride 0.9 % infusion, 100 mL/hr, Intravenous, Continuous, Tomas Alamo DO, Last Rate: 100 mL/hr at 12/02/19 1240, 100 mL/hr at 12/02/19 1240  •  sodium chloride 0.9 % infusion, 100 mL/hr, Intravenous, Continuous, Tomas Alamo, , Last Rate: 100 mL/hr at 12/03/19 0815, 100 mL/hr at 12/03/19 0815    Assessment/Plan         Incisional hernia    Impression: Postop day 3 open incisional hernia repair with bilateral component release and placement of mesh.    Plan: Can go home tomorrow if it is still doing this well.  The 19 Regan drain can be removed.  The 215 Regan drainage she should go home with.  We will leave scripts in the chart for pain medicine and antibiotics.  Dr. Evans will see her tomorrow in my absence.  We will see her in the office in 2 weeks          Tomas Alamo DO  12/05/19  4:14 PM

## 2019-12-05 NOTE — PLAN OF CARE
Problem: Patient Care Overview  Goal: Plan of Care Review  Outcome: Ongoing (interventions implemented as appropriate)   12/05/19 0242   Coping/Psychosocial   Plan of Care Reviewed With patient   Plan of Care Review   Progress improving   OTHER   Outcome Summary belching but no flatus, ambulating, pain better, less nausea, awaiting bowel function     Goal: Discharge Needs Assessment  Outcome: Ongoing (interventions implemented as appropriate)   12/03/19 1521   Discharge Needs Assessment   Readmission Within the Last 30 Days no previous admission in last 30 days   Concerns to be Addressed denies needs/concerns at this time;discharge planning   Patient/Family Anticipates Transition to home with family   Patient/Family Anticipated Services at Transition none   Transportation Concerns car, none   Transportation Anticipated family or friend will provide   Anticipated Changes Related to Illness none   Equipment Needed After Discharge none   Disability   Equipment Currently Used at Home none       Problem: Surgery Nonspecified (Adult)  Goal: Signs and Symptoms of Listed Potential Problems Will be Absent, Minimized or Managed (Surgery Nonspecified)  Outcome: Ongoing (interventions implemented as appropriate)   12/05/19 0242   Goal/Outcome Evaluation   Problems Assessed (Surgery) all   Problems Present (Surgery) bowel motility decreased;pain

## 2019-12-06 ENCOUNTER — NURSE TRIAGE (OUTPATIENT)
Dept: CALL CENTER | Facility: HOSPITAL | Age: 64
End: 2019-12-06

## 2019-12-06 VITALS
BODY MASS INDEX: 40.5 KG/M2 | DIASTOLIC BLOOD PRESSURE: 72 MMHG | HEIGHT: 64 IN | TEMPERATURE: 98.7 F | HEART RATE: 102 BPM | SYSTOLIC BLOOD PRESSURE: 106 MMHG | WEIGHT: 237.22 LBS | OXYGEN SATURATION: 93 % | RESPIRATION RATE: 17 BRPM

## 2019-12-06 PROCEDURE — 99024 POSTOP FOLLOW-UP VISIT: CPT | Performed by: SURGERY

## 2019-12-06 PROCEDURE — 99232 SBSQ HOSP IP/OBS MODERATE 35: CPT | Performed by: HOSPITALIST

## 2019-12-06 RX ORDER — POLYETHYLENE GLYCOL 3350 17 G/17G
17 POWDER, FOR SOLUTION ORAL DAILY
Start: 2019-12-06 | End: 2020-01-21

## 2019-12-06 RX ADMIN — OXYCODONE HYDROCHLORIDE 10 MG: 5 TABLET ORAL at 05:53

## 2019-12-06 RX ADMIN — PANTOPRAZOLE SODIUM 40 MG: 40 TABLET, DELAYED RELEASE ORAL at 05:53

## 2019-12-06 RX ADMIN — OXYCODONE HYDROCHLORIDE 10 MG: 5 TABLET ORAL at 10:12

## 2019-12-06 RX ADMIN — RAMIPRIL 20 MG: 5 CAPSULE ORAL at 05:52

## 2019-12-06 RX ADMIN — MONTELUKAST SODIUM 10 MG: 10 TABLET, COATED ORAL at 09:24

## 2019-12-06 NOTE — PLAN OF CARE
Problem: Patient Care Overview  Goal: Plan of Care Review  Outcome: Ongoing (interventions implemented as appropriate)   12/06/19 7433   Coping/Psychosocial   Plan of Care Reviewed With patient   Plan of Care Review   Progress improving       Problem: Surgery Nonspecified (Adult)  Goal: Signs and Symptoms of Listed Potential Problems Will be Absent, Minimized or Managed (Surgery Nonspecified)  Outcome: Ongoing (interventions implemented as appropriate)

## 2019-12-06 NOTE — CONSULTS
Nutrition Services    Patient Name:  Brianna Caldera  YOB: 1955  MRN: [id number]  Admit Date:  12/2/2019     Comment:     R/t patient has been on FLD x 4 days, adding boost plus BID btw meals (provides 720 kcals, 28 gm protein)       Reason for Assessment     Row Name           Reason for Assessment    Reason For Assessment Full liquid diet x 4 days        Diagnosis H&P: GERD, HTN, HLD, Prediabetes, ex lap     Current Problems:   Postop day 3 open incisional hernia repair with bilateral component release and placement of mesh.    Mild YEN (Resolved)            Nutrition/Diet History     Row Name           Nutrition/Diet History    Typical Food/Fluid Intake Visited pt who reports her last solid meal was Saturday (6 days) ago, prior to this point pt reports her appetite was good.    Of note, pt states she has intentionally tried to loose weight by joining weight watchers and limiting her carbohydrate intake. Pt reports she has lost about 23# since joining the program in April (Per EMR appears 20# wt loss has occurred over 2 years).     Today pt reports she is tolerating her full liquid diet fairly well. Pt did report eating potato soup yesterday that caused some reflux, but she did not actually vomit. Other than that one occurrence, pt reports tolerating her diet.    Pt reports she may dc today. Pt is unsure if she will be dc'd home on a full liquid diet. If she is, encouraged pt to drink protein oral nutritional supplement of choice or have source of dairy at every meal to promote good protein sources.        Functional Status Pt states prior to admission living at home with her . Functionally still able to cook for herself and ambulates independently.        Food Allergies  NKFA        Factors Affecting Nutritional Intake  Recent hernia repair          Anthropometrics             Anthropometrics    Height 64 inches     Weight 108 kg (237 lb 3.4 oz)    12/2/19        Admit Weight    Admit  Weight 108 kg (237 lb 3.4 oz)    12/2/19        Ideal Body Weight (IBW)    Ideal Body Weight (IBW) (kg) 120#     % Ideal Body Weight 198%        Usual Body Weight (UBW)    Usual Body Weight 260# per pt x 8 months ago    % Usual Body Weight 91%     Weight Hx  241# (11/7/19)  242# (10/18/19)  247# (6/17/19)  243# (4/12/19)  256# (9/14/18)  257# (8/27/18)     Noted gradual 20# x 2 years        Body Mass Index (BMI)    BMI (kg/m2) 40.72           Labs/Tests/Procedures/Meds                Labs    Pertinent Lab Results Comments Gluc 136 H, Phos 1.8 L  (noted labs from 12/3)    Called RN to request labs redraw r/t low phos, may need replacement.         Medications    Pertinent Medications Comments Relistor, protonix, oxycodone prn          Physical Findings                Physical Findings    Overall Physical Appearance NFPE completed on 12/6/19, very questionable mild temple wasting, however all other areas well nourished. No malnutrition dxs given at this time       Gastrointestinal 12/5 + BM        Tubes None at this time        Oral/Mouth Cavity Pt denies chewing or swallowing issues.       Skin abd sx incision          Estimated/Assessed Needs              Calculation Measurements    Weight Used For Calculations      Height         KCAL/KG    KCAL/KG               Protein Requirements    Weight Used For Protein Calculations      Est Protein Requirement Amount (gms/kg)      Estimated Protein Requirements (gms/day)         Fluid Requirements    Estimated Fluid Requirements (mL/day)          Nutrition Prescription Ordered                Nutrition Prescription PO    Current PO Diet  Full liquid diet      Supplement  -- -     Supplement Frequency  -- -        Nutrition Prescription EN    Enteral Route  -- -     Product  -- -     TF Delivery Method  -----     Continuous TF Goal Rate (mL/hr)  -- -     Water flush (mL)   -- -     Water Flush Frequency  -- -        Nutrition Prescription PN    PN Route  -- -     PN Goal  Volume (mL)  -- -     Dextrose (Kcal)  -- -     Amino Acid (gm)  -- -     Lipid Concentration (%)  -- -     Lipid Volume (mL)  -- -     Lipid Frequency  -- -         Evaluation of Received Nutrient/Fluid Intake                PO Evaluation    % PO Intake % of meals         EN Evaluation    TF Changes  -- -     TF Residual  -- -     TF Tolerance  -- -        PN Evaluation    Number of Days PN Evaluated  -- -           Problem/Interventions:  Problem 1                Nutrition Diagnoses Problem 1    Problem 1 Inadequate oral intakes r/t s/p hernia repair AEB pt has been on FLD x 4 days.                Intervention Goal                Intervention Goal    General Diet to advance    Pt will continue to eat % of meals.     Pt will accept at least one ONS daily          Nutrition Intervention                Nutrition Intervention    RD/Tech Action Starting ONS          Nutrition Prescription     Row Name           Nutrition Prescription PO    Supplement Boost Plus (prefers vanilla)      Supplement Frequency BID         Nutrition Prescription EN    Enteral Prescription  -- -        Nutrition Prescription PN    Parenteral Prescription  -- -         Education/Evaluation                Monitor/Evaluation    Monitor  diet advancement, PO intakes, labs, BM, weight and skin                Electronically signed by:  Maggie Coffey RD  12/06/19 2:47 PM

## 2019-12-06 NOTE — PAYOR COMM NOTE
"Clinical UD for Sahli Caldera  1955  Clinicals for 2019  Ref #[id number]     AUTHORIZATION PENDING  PLEASE FORWARD DETERMINATION TO FOLLOWING CONTACT:    SANDEEP ARMIJO LPN [facility]    [phone]    Sahil Caldera (64 y.o. Female)     Date of Birth Social Security Number Address Home Phone MRN    1955  [address] [place] [address] [phone] [id number]    Hindu Marital Status          Unknown        Admission Date Admission Type Admitting Provider Attending Provider Department, Room/Bed    19 Elective Tomas Alamo, Tomas Vásquez DO [facility] SURGICAL INPATIENT,     Discharge Date Discharge Disposition Discharge Destination                       Attending Provider:  Tomas Alamo DO    Allergies:  No Known Allergies    Isolation:  None   Infection:  None   Code Status:  CPR    Ht:  162.6 cm (64\")   Wt:  108 kg (237 lb 3.4 oz)    Admission Cmt:  None   Principal Problem:  Incisional hernia [K43.2] More...                 Active Insurance as of 2019     Primary Coverage     Payor Plan Insurance Group Employer/Plan Group    [employer]HE[employer] BLUE CROSS AN[employer] BLUE CROSS BLUE SHIELD PPO [id number]     Payor Plan Address Payor Plan Phone Number Payor Plan Fax Number Effective Dates    [address] [phone]  2019 - None Entered    [place] [address]       Subscriber Name Subscriber Birth Date Member ID       SAHIL CALDERA 1955 [id number]                 Emergency Contacts      (Rel.) Home Phone Work Phone Mobile Phone    EVIEMIRANDAE \"LOUISA\" (Spouse) -- -- [phone]    Mai Floyd (Daughter) -- -- --               Physician Progress Notes (last 24 hours) (Notes from 19 1150 through 19 1150)      Hayden Mcdowell MD at 19 [id number]                [facility] Medicine Services Daily Progress Note      Hospitalist Team  LOS 3 days      Patient Care Team:  Onur" "Maegan Fair MD as PCP - General (Family Medicine)    Patient Location: 4112/1      Subjective   Subjective        Chief Complaint / Subjective  Not passing gas, no bowel movement yet, denies for any other active complaint, no nausea or vomiting.     Present on Admission:  **None**      Brief Synopsis of Hospital Course/HPI    Ms. Caldera is a 64 y.o.  [facility] incision hernia with OPEN INCISIONAL HERNIA REPAIR BILATERAL COMPONENT RELEASE WITH MESH. we were asked to see the patient in consult post op for medical management of hypertension, GERD and other chronic medical issues. Patient denies for any chest pain or any short of breath.          Date::          ROS      Objective   Objective      Vital Signs  Temp:  [98.2 °F (36.8 °C)-98.7 °F (37.1 °C)] 98.3 °F (36.8 °C)  Heart Rate:  [] 93  Resp:  [16-18] 18  BP: ()/(63-85) 95/66  Oxygen Therapy  SpO2: 91 %  Pulse Oximetry Type: Intermittent  Device (Oxygen Therapy): room air  Flow (L/min): 2  Flowsheet Rows      First Filed Value   Admission Height  162.6 cm (64\") Documented at 12/02/2019 0617   Admission Weight  108 kg (237 lb 3.4 oz) Documented at 12/02/2019 0617        Intake & Output (last 3 days)       12/02 0701 - 12/03 0700 12/03 0701 - 12/04 0700 12/04 0701 - 12/05 0700 12/05 0701 - 12/06 0700    P.O. 490 1300 1140 960    I.V. (mL/kg) 2500 (23.1)       Total Intake(mL/kg) 2990 (27.7) 1300 (12) 1140 (10.6) 960 (8.9)    Urine (mL/kg/hr) 1175 (0.5) 2100 (0.8) 2100 (0.8) 600 (0.5)    Drains 510 185 240 70    Stool    0    Blood 200       Total Output 1885 2285 2340 670    Net +1105 -985 -1200 +290            Stool Unmeasured Occurrence    1 x        Lines, Drains & Airways    Active LDAs     Name:   Placement date:   Placement time:   Site:   Days:    Peripheral IV 12/02/19 0640 Left;Posterior Hand   12/02/19    0640    Hand   1    Closed/Suction Drain 1 Anterior Abdomen Bulb 19 Fr.   12/02/19    0859    " Abdomen   1    Closed/Suction Drain 2 Anterior;Right Abdomen Bulb 15 Fr.   12/02/19    0900    Abdomen   1    Closed/Suction Drain 3 Anterior;Left Abdomen Bulb 15 Fr.   12/02/19    1017    Abdomen   1                  Physical Exam:    Physical Exam   Constitutional: She is oriented to person, place, and time. She appears well-developed and well-nourished. No distress.   HENT:   Head: Normocephalic and atraumatic.   Right Ear: External ear normal.   Left Ear: External ear normal.   Nose: Nose normal.   Mouth/Throat: Oropharynx is clear and moist. No oropharyngeal exudate.   Eyes: Conjunctivae and EOM are normal. Pupils are equal, round, and reactive to light. Right eye exhibits no discharge. Left eye exhibits no discharge. No scleral icterus.   Neck: Normal range of motion. No JVD present. No tracheal deviation present. No thyromegaly present.   Cardiovascular: Normal rate, regular rhythm, normal heart sounds and intact distal pulses. Exam reveals no gallop and no friction rub.   No murmur heard.  Pulmonary/Chest: Effort normal and breath sounds normal. No stridor. No respiratory distress. She has no wheezes. She has no rales. She exhibits no tenderness.   Abdominal: Soft. Bowel sounds are normal. She exhibits no distension and no mass. There is no tenderness. There is no rebound and no guarding. No hernia.   Musculoskeletal: Normal range of motion. She exhibits no edema, tenderness or deformity.   Lymphadenopathy:     She has no cervical adenopathy.   Neurological: She is alert and oriented to person, place, and time. No cranial nerve deficit or sensory deficit. She exhibits normal muscle tone. Coordination normal.   Skin: Skin is warm and dry. No rash noted. She is not diaphoretic. No erythema.   Psychiatric: She has a normal mood and affect. Her behavior is normal.   Nursing note and vitals reviewed.        Procedures:    Procedure(s):  OPEN INCISIONAL HERNIA REPAIR BILATERAL COMPONENT RELEASE WITH  MESH.          Results Review:     I reviewed the patient's new clinical results.      Lab Results (last 24 hours)     ** No results found for the last 24 hours. **        No results found for: HGBA1C                Microbiology Results (last 10 days)     ** No results found for the last 240 hours. **          ECG/EMG Results (most recent)     None                    No radiology results for the last 7 days    Xrays, labs reviewed personally by physician.    Medication Review:   I have reviewed the patient's current medication list      Scheduled Meds    methylnaltrexone 12 mg Subcutaneous Daily   montelukast 10 mg Oral Daily   pantoprazole 40 mg Oral Q AM   ramipril 20 mg Oral QAM       Meds Infusions    sodium chloride 100 mL/hr Last Rate: 100 mL/hr (12/02/19 1240)   sodium chloride 100 mL/hr Last Rate: 100 mL/hr (12/03/19 0815)       Meds PRN  •  aluminum-magnesium hydroxide-simethicone  •  HYDROcodone-acetaminophen  •  HYDROmorphone **AND** naloxone  •  Morphine **AND** naloxone  •  ondansetron **OR** ondansetron  •  oxyCODONE  •  promethazine **OR** promethazine        Assessment/Plan   Assessment/Plan     Active Hospital Problems:  No notes have been filed under this hospital service.  Service: Hospitalist          Resolved Hospital Problems:  No notes have been filed under this hospital service.  Service: Hospitalist    Assessment and Plan.    Status post OPEN INCISIONAL HERNIA REPAIR BILATERAL COMPONENT RELEASE WITH MESH, post op care as per primary, advance diet as per primary, labs reviewed.      Hypertension vital reviewed. .... Continue Ramipril, monitor vitals ... Reviewed.    Mild acute kidney injury resolved now, avoid nephro toxic medication, monitor urine output.      GERD ..... Continue protonix, monitor for symptoms.     Seasonal allergy .... Noted on singular     Dvt prophylaxis with SCD.          VTE Prophylaxis - SCDs.      Code Status -   Code Status and Medical Interventions:   Ordered at:  12/02/19 1154     Code Status:    CPR     Medical Interventions (Level of Support Prior to Arrest):    Full       Discharge Planning    Destination      No service coordination in this encounter.      Durable Medical Equipment      No service coordination in this encounter.      Dialysis/Infusion      No service coordination in this encounter.      Home Medical Care      No service coordination in this encounter.      Therapy      No service coordination in this encounter.      Community Resources      No service coordination in this encounter.            Electronically signed by Hayden Mcdowell MD, 12/05/19, 5:07 PM.  [facility] Hospitalist Team        Electronically signed by Hayden Mcdowell MD at 12/05/19 1710     Tomas Alamo DO at 12/05/19 1614             LOS: 3 days   Patient Care Team:  Maegan Mohr MD as PCP - General (Family Medicine)    Reason for follow-up: Postop    Subjective   Patient seen and examined.  She is getting up moving better.  Did have a bowel movement today.    Objective   Incision is clean dry and intact without infection.  Drain tubes are putting out appropriate color and volume    Vital Signs  Vitals:    12/04/19 1500 12/04/19 2300 12/05/19 0500 12/05/19 1500   BP: 106/69 122/85 95/63 95/66   BP Location:  Right arm     Patient Position:  Lying     Pulse: 94 105 95 93   Resp: 13 16 17 18   Temp: 98.5 °F (36.9 °C) 98.2 °F (36.8 °C) 98.7 °F (37.1 °C) 98.3 °F (36.8 °C)   TempSrc: Oral Oral  Oral   SpO2: 95% 94% 90% 91%   Weight:       Height:             Results Review:       Lab Results (last 24 hours)     ** No results found for the last 24 hours. **           Imaging Results (Last 24 Hours)     ** No results found for the last 24 hours. **          Medication Review:   Current Facility-Administered Medications:   •  aluminum-magnesium hydroxide-simethicone (MAALOX MAX) 400-400-40 MG/5ML suspension 15 mL, 15 mL, Oral, Q4H PRN, Tomas Alamo DO, 15 mL  at 12/05/19 0758  •  HYDROcodone-acetaminophen (NORCO) 7.5-325 MG per tablet 1 tablet, 1 tablet, Oral, Q4H PRN, Tomas Alamo, DO, 1 tablet at 12/05/19 0350  •  HYDROmorphone (DILAUDID) injection 0.5 mg, 0.5 mg, Intravenous, Q2H PRN, 0.5 mg at 12/02/19 1504 **AND** naloxone (NARCAN) injection 0.1 mg, 0.1 mg, Intravenous, Q5 Min PRN, Tomas Alamo, DO  •  methylnaltrexone (RELISTOR) injection 12 mg, 12 mg, Subcutaneous, Daily, Tomas Alamo, DO, 12 mg at 12/05/19 0919  •  montelukast (SINGULAIR) tablet 10 mg, 10 mg, Oral, Daily, Tomas Alamo, DO, 10 mg at 12/05/19 0919  •  Morphine sulfate (PF) injection 4 mg, 4 mg, Intravenous, Q2H PRN **AND** naloxone (NARCAN) injection 0.4 mg, 0.4 mg, Intravenous, Q5 Min PRN, Tomas Alamo, DO  •  ondansetron (ZOFRAN) tablet 4 mg, 4 mg, Oral, Q6H PRN **OR** ondansetron (ZOFRAN) injection 4 mg, 4 mg, Intravenous, Q6H PRN, Tomas Alamo, DO, 4 mg at 12/04/19 1444  •  oxyCODONE (ROXICODONE) immediate release tablet 10 mg, 10 mg, Oral, Q4H PRN, Tomas Alamo, DO, 10 mg at 12/05/19 1329  •  pantoprazole (PROTONIX) EC tablet 40 mg, 40 mg, Oral, Q AM, Tomas Alamo, DO, 40 mg at 12/05/19 0540  •  promethazine (PHENERGAN) IVPB 12.5 mg, 12.5 mg, Intravenous, Q6H PRN, 12.5 mg at 12/04/19 1745 **OR** promethazine (PHENERGAN) injection 12.5 mg, 12.5 mg, Intramuscular, Q6H PRN, Tomas Alamo, DO  •  ramipril (ALTACE) capsule 20 mg, 20 mg, Oral, QAM, Tomas Alamo, , 20 mg at 12/05/19 0540  •  sodium chloride 0.9 % infusion, 100 mL/hr, Intravenous, Continuous, Tomas Alamo DO, Last Rate: 100 mL/hr at 12/02/19 1240, 100 mL/hr at 12/02/19 1240  •  sodium chloride 0.9 % infusion, 100 mL/hr, Intravenous, Continuous, Tomas Alamo DO, Last Rate: 100 mL/hr at 12/03/19 0815, 100 mL/hr at 12/03/19 0815    Assessment/Plan         Incisional hernia    Impression: Postop day 3 open incisional hernia repair with bilateral component release and placement of mesh.    Plan: Can  go home tomorrow if it is still doing this well.  The 19 Regan drain can be removed.  The 215 Regan drainage she should go home with.  We will leave scripts in the chart for pain medicine and antibiotics.  Dr. Evans will see her tomorrow in my absence.  We will see her in the office in 2 weeks          Tomas Alamo DO  12/05/19  4:14 PM            Electronically signed by Tomas Alamo DO at 12/05/19 1616       Consult Notes (last 24 hours) (Notes from 12/05/19 1150 through 12/06/19 1150)     No notes of this type exist for this encounter.

## 2019-12-06 NOTE — DISCHARGE SUMMARY
Date of Discharge:  12/6/2019    Discharge Diagnosis: Incisional hernia    Presenting Problem/History of Present Illness  Active Hospital Problems    Diagnosis  POA   • **Incisional hernia [K43.2]  Unknown      Resolved Hospital Problems   No resolved problems to display.      Hospital Course  Patient was admitted to the hospital following her planned elective incisional hernia repair which was completed by Dr. Alamo without complication.  Her postoperative course was relatively uneventful.  As her pain was controlled and her bowel function resumed her diet was advanced.  On the day of discharge she is afebrile with normal vital signs pain is controlled with oral pain medication she is passing flatus had a bowel movement yesterday and is ambulatory.  Her MARY drains are serosanguineous.    Procedures Performed    Procedure(s):  OPEN INCISIONAL HERNIA REPAIR BILATERAL COMPONENT RELEASE WITH MESH.  -------------------        Consults:   Consults     Date and Time Order Name Status Description    12/3/2019 0920 Inpatient Hospitalist Consult      12/2/2019 1154 Inpatient Hospitalist Consult            Condition on Discharge: Satisfactory    Vital Signs  Temp:  [98.2 °F (36.8 °C)-98.7 °F (37.1 °C)] 98.7 °F (37.1 °C)  Heart Rate:  [] 102  Resp:  [16-17] 17  BP: ()/(65-75) 106/72    Physical Exam:  Normocephalic/atraumatic no acute distress  Nonlabored respirations on room air  Abdomen is soft and obese is mildy tender to palpation which is appropriate.  The MARY drains are all serosanguineous.  Discharge Disposition  Home or Self Care    Discharge Medications     Discharge Medications      New Medications      Instructions Start Date   oxyCODONE-acetaminophen  MG per tablet  Commonly known as:  PERCOCET   1 tablet, Oral, Every 4 Hours PRN      polyethylene glycol packet  Commonly known as:  MIRALAX   17 g, Oral, Daily      sulfamethoxazole-trimethoprim 800-160 MG per tablet  Commonly known as:  BACTRIM  DS   1 tablet, Oral, 2 Times Daily         Continue These Medications      Instructions Start Date   furosemide 20 MG tablet  Commonly known as:  LASIX   20 mg, Oral, Daily PRN, Do not take day of surgery       montelukast 10 MG tablet  Commonly known as:  SINGULAIR   TAKE 1 TABLET BY MOUTH EVERY DAY      MULTIVITAMIN ADULTS PO   1 tablet, Oral, Daily, Stop 11-25-19 for surgery       potassium chloride 10 MEQ CR tablet  Commonly known as:  K-DUR,KLOR-CON   10 mEq, Oral, Daily PRN      PrilOSEC 10 MG capsule  Generic drug:  omeprazole   Oral, Daily PRN      ramipril 10 MG capsule  Commonly known as:  ALTACE   20 mg, Oral, Every Morning, Do not take 24 hours prior to surgery              Discharge Diet:   Diet Instructions     Diet: Regular      Discharge Diet:  Regular          Activity at Discharge:   Activity Instructions     Lifting Restrictions      Type of Restriction:  Lifting    Lifting Restrictions:  Other    Explain Lifing Restrictions:  No lifting more than 15 lbs for 4-6 weeks. Or otherwise specified the day of surgery.    Other Activity Instructions      Avoid shower or tub soak until after drain removal.          Follow-up Appointments  No future appointments.  Additional Instructions for the Follow-ups that You Need to Schedule     Discharge Follow-up with Specified Provider: Dr. Alamo, General Surgery. Call [phone] to schedule; 1 Week   As directed      To:  Dr. Alamo, General Surgery. Call [phone] to schedule    Follow Up:  1 Week               Test Results Pending at Discharge       Darrel Evans MD  12/06/19  5:05 PM

## 2019-12-06 NOTE — PROGRESS NOTES
"      [facility] Medicine Services Daily Progress Note      Hospitalist Team  LOS 4 days      Patient Care Team:  Maegan Mohr MD as PCP - General (Family Medicine)    Patient Location: 4112/1      Subjective   Subjective        Chief Complaint / Subjective  She said she passed the gas, has bowel movement, denies for any nausea or vomiting. No chest pain.     Present on Admission:  **None**      Brief Synopsis of Hospital Course/HPI    Ms. Caldera is a 64 y.o.  presents to [facility] underwent repair of incision hernia with OPEN INCISIONAL HERNIA REPAIR BILATERAL COMPONENT RELEASE WITH MESH. we were asked to see the patient in consult post op for medical management of hypertension, GERD and other chronic medical issues. Patient denies for any chest pain or any short of breath.          Date::          ROS      Objective   Objective      Vital Signs  Temp:  [98.2 °F (36.8 °C)-98.4 °F (36.9 °C)] 98.4 °F (36.9 °C)  Heart Rate:  [83-93] 85  Resp:  [16-18] 17  BP: ()/(65-75) 99/65  Oxygen Therapy  SpO2: 92 %  Pulse Oximetry Type: Intermittent  Device (Oxygen Therapy): room air  Flow (L/min): 2  Flowsheet Rows      First Filed Value   Admission Height  162.6 cm (64\") Documented at 12/02/2019 0617   Admission Weight  108 kg (237 lb 3.4 oz) Documented at 12/02/2019 0617        Intake & Output (last 3 days)       12/03 0701 - 12/04 0700 12/04 0701 - 12/05 0700 12/05 0701 - 12/06 0700 12/06 0701 - 12/07 0700    P.O. 1300 1140 1200 240    I.V. (mL/kg)        Total Intake(mL/kg) 1300 (12) 1140 (10.6) 1200 (11.1) 240 (2.2)    Urine (mL/kg/hr) 2100 (0.8) 2100 (0.8) 1300 (0.5) 300 (0.6)    Drains 185 240 180     Stool   0     Blood        Total Output 2285 2340 1480 300    Net -985 -1200 -280 -60            Stool Unmeasured Occurrence   1 x         Lines, Drains & Airways    Active LDAs     Name:   Placement date:   Placement time:   Site:   Days:    Peripheral IV 12/02/19 0640 " Left;Posterior Hand   12/02/19    0640    Hand   1    Closed/Suction Drain 1 Anterior Abdomen Bulb 19 Fr.   12/02/19    0859    Abdomen   1    Closed/Suction Drain 2 Anterior;Right Abdomen Bulb 15 Fr.   12/02/19    0900    Abdomen   1    Closed/Suction Drain 3 Anterior;Left Abdomen Bulb 15 Fr.   12/02/19    1017    Abdomen   1                  Physical Exam:    Physical Exam   Constitutional: She is oriented to person, place, and time. She appears well-developed and well-nourished. No distress.   HENT:   Head: Normocephalic and atraumatic.   Right Ear: External ear normal.   Left Ear: External ear normal.   Nose: Nose normal.   Mouth/Throat: Oropharynx is clear and moist. No oropharyngeal exudate.   Eyes: Conjunctivae and EOM are normal. Pupils are equal, round, and reactive to light. Right eye exhibits no discharge. Left eye exhibits no discharge. No scleral icterus.   Neck: Normal range of motion. No JVD present. No tracheal deviation present. No thyromegaly present.   Cardiovascular: Normal rate, regular rhythm, normal heart sounds and intact distal pulses. Exam reveals no gallop and no friction rub.   No murmur heard.  Pulmonary/Chest: Effort normal and breath sounds normal. No stridor. No respiratory distress. She has no wheezes. She has no rales. She exhibits no tenderness.   Abdominal: Soft. Bowel sounds are normal. She exhibits no distension and no mass. There is no tenderness. There is no rebound and no guarding. No hernia.   Musculoskeletal: Normal range of motion. She exhibits no edema, tenderness or deformity.   Lymphadenopathy:     She has no cervical adenopathy.   Neurological: She is alert and oriented to person, place, and time. No cranial nerve deficit or sensory deficit. She exhibits normal muscle tone. Coordination normal.   Skin: Skin is warm and dry. No rash noted. She is not diaphoretic. No erythema.   Psychiatric: She has a normal mood and affect. Her behavior is normal.   Nursing note and  vitals reviewed.        Procedures:    Procedure(s):  OPEN INCISIONAL HERNIA REPAIR BILATERAL COMPONENT RELEASE WITH MESH.          Results Review:     I reviewed the patient's new clinical results.      Lab Results (last 24 hours)     ** No results found for the last 24 hours. **        No results found for: HGBA1C                Microbiology Results (last 10 days)     ** No results found for the last 240 hours. **          ECG/EMG Results (most recent)     None                    No radiology results for the last 7 days    Xrays, labs reviewed personally by physician.    Medication Review:   I have reviewed the patient's current medication list      Scheduled Meds    methylnaltrexone 12 mg Subcutaneous Daily   montelukast 10 mg Oral Daily   pantoprazole 40 mg Oral Q AM   ramipril 20 mg Oral QAM       Meds Infusions    sodium chloride 100 mL/hr Last Rate: 100 mL/hr (12/02/19 1240)   sodium chloride 100 mL/hr Last Rate: 100 mL/hr (12/03/19 0815)       Meds PRN  •  aluminum-magnesium hydroxide-simethicone  •  HYDROcodone-acetaminophen  •  HYDROmorphone **AND** naloxone  •  Morphine **AND** naloxone  •  ondansetron **OR** ondansetron  •  oxyCODONE  •  promethazine **OR** promethazine        Assessment/Plan   Assessment/Plan     Active Hospital Problems:  No notes have been filed under this hospital service.  Service: Hospitalist          Resolved Hospital Problems:  No notes have been filed under this hospital service.  Service: Hospitalist    Assessment and Plan.    Status post OPEN INCISIONAL HERNIA REPAIR BILATERAL COMPONENT RELEASE WITH MESH, post op care as per primary, advance diet as per primary, labs reviewed.      Hypertension vital reviewed. .... Continue Ramipril, monitor vitals ... Reviewed.    Mild acute kidney injury resolved now, avoid nephro toxic medication, monitor urine output.      GERD ..... Continue protonix, monitor for symptoms.     Seasonal allergy .... Noted on singular     Dvt prophylaxis  with SCD.    Pt medically stable, ok to discharge from my stand point of view to follow with Family physician in a week time.      VTE Prophylaxis - SCDs.      Code Status -   Code Status and Medical Interventions:   Ordered at: 12/02/19 1154     Code Status:    CPR     Medical Interventions (Level of Support Prior to Arrest):    Full       Discharge Planning    Destination      No service coordination in this encounter.      Durable Medical Equipment      No service coordination in this encounter.      Dialysis/Infusion      No service coordination in this encounter.      Home Medical Care      No service coordination in this encounter.      Therapy      No service coordination in this encounter.      Community Resources      No service coordination in this encounter.            Electronically signed by Hayden Mcdowell MD, 12/06/19, 11:53 AM.  [facility] Hospitalist Team

## 2019-12-07 ENCOUNTER — READMISSION MANAGEMENT (OUTPATIENT)
Dept: CALL CENTER | Facility: HOSPITAL | Age: 64
End: 2019-12-07

## 2019-12-07 NOTE — TELEPHONE ENCOUNTER
"The call was received form [facility] pharmacy at [facility] regarding a patient that did not have a K43.2 and the plan of care is to follow up with Dr. Alamo.     Reason for Disposition  • Pharmacy calling with prescription question and triager answers question    Additional Information  • Negative: Drug overdose and nurse unable to answer question  • Negative: Caller requesting information not related to medicine  • Negative: Caller requesting a prescription for Strep throat and has a positive culture result  • Negative: Rash while taking a medication or within 3 days of stopping it  • Negative: Immunization reaction suspected  • Negative: [1] Asthma and [2] having symptoms of asthma (cough, wheezing, etc)  • Negative: MORE THAN A DOUBLE DOSE of a prescription or over-the-counter (OTC) drug  • Negative: [1] DOUBLE DOSE (an extra dose or lesser amount) of over-the-counter (OTC) drug AND [2] any symptoms (e.g., dizziness, nausea, pain, sleepiness)  • Negative: [1] DOUBLE DOSE (an extra dose or lesser amount) of prescription drug AND [2] any symptoms (e.g., dizziness, nausea, pain, sleepiness)  • Negative: Took another person's prescription drug  • Negative: [1] DOUBLE DOSE (an extra dose or lesser amount) of prescription drug AND [2] NO symptoms (Exception: a double dose of antibiotics)  • Negative: Diabetes drug error or overdose (e.g., insulin or extra dose)  • Negative: [1] Request for URGENT new prescription or refill of \"essential\" medication (i.e., likelihood of harm to patient if not taken) AND [2] triager unable to fill per unit policy  • Negative: [1] Prescription not at pharmacy AND [2] was prescribed today by PCP  • Negative: Pharmacy calling with prescription questions and triager unable to answer question  • Negative: Caller has URGENT medication question about med that PCP prescribed and triager unable to answer question  • Negative: Caller has NON-URGENT medication question about med that PCP prescribed and " "triager unable to answer question  • Negative: Caller requesting a NON-URGENT new prescription or refill and triager unable to refill per unit policy  • Negative: Caller has medication question about med not prescribed by PCP and triager unable to answer question (e.g., compatibility with other med, storage)  • Negative: [1] DOUBLE DOSE (an extra dose or lesser amount) of over-the-counter (OTC) drug AND [2] NO symptoms  • Negative: [1] DOUBLE DOSE (an extra dose or lesser amount) of antibiotic drug AND [2] NO symptoms  • Negative: Caller has medication question only, adult not sick, and triager answers question  • Negative: Caller has medication question, adult has minor symptoms, caller declines triage, and triager answers question  • Negative: Caller requesting information about medication during pregnancy; adult is not ill and triager answers question  • Negative: Caller requesting information about medication use with breastfeeding; neither adult nor infant is ill, and triager answers question  • Negative: Caller requesting a refill, no triage required, and triager able to refill per unit policy    Answer Assessment - Initial Assessment Questions  1. SYMPTOMS: \"Do you have any symptoms?\"      See note   2. SEVERITY: If symptoms are present, ask \"Are they mild, moderate or severe?\"      See note    Protocols used: MEDICATION QUESTION CALL-ADULT-      "

## 2019-12-07 NOTE — OUTREACH NOTE
Prep Survey      Responses   Facility patient discharged from?  [facility]   Is patient eligible?  Yes   Discharge diagnosis  Incisional hernia-open hernia repair   Does the patient have one of the following disease processes/diagnoses(primary or secondary)?  General Surgery   Does the patient have Home health ordered?  No   Is there a DME ordered?  No   Prep survey completed?  Yes          Irais Patino RN

## 2019-12-09 ENCOUNTER — TRANSITIONAL CARE MANAGEMENT TELEPHONE ENCOUNTER (OUTPATIENT)
Dept: FAMILY MEDICINE CLINIC | Facility: CLINIC | Age: 64
End: 2019-12-09

## 2019-12-09 ENCOUNTER — READMISSION MANAGEMENT (OUTPATIENT)
Dept: CALL CENTER | Facility: HOSPITAL | Age: 64
End: 2019-12-09

## 2019-12-09 NOTE — OUTREACH NOTE
General Surgery Week 1 Survey      Responses   Facility patient discharged from?  [facility]   Does the patient have one of the following disease processes/diagnoses(primary or secondary)?  General Surgery   Is there a successful TCM telephone encounter documented?  No   Week 1 attempt successful?  No   Revoke  Decline to participate [No answer/left voicemail]          Jasmin Cedillo LPN

## 2019-12-09 NOTE — OUTREACH NOTE
Spoke with pt, feels she is doing very well s/p incisional hernia sx. Appetite is good, no nausea, no bowel issues. Incision looks good. No unusual tenderness, redness, drainage. No fever, chills. Confirmed receipt and understanding of d/c orders and medications. [facility] is sched for 12/13/19 with Dr Mohr.

## 2019-12-09 NOTE — PROGRESS NOTES
Case Management Discharge Note      Final Note: routine discharge home with spouse                   Final Discharge Disposition Code: 01 - home or self-care

## 2019-12-09 NOTE — PAYOR COMM NOTE
"DC Notification/Requesting IP determination for Pending DOS  Patient:  Sahil Caldera  1955  Ref #[id number]   Dc routine to home no needs 2019    AUTHORIZATION [id number]-  PLEASE FORWARD DETERMINATION TO FOLLOWING CONTACT:    SANDEEP ARMIJO LPN [facility]    [phone]    Sahil Caldera (64 y.o. Female)     Date of Birth Social Security Number Address Home Phone MRN    1955  [address] [place] [address] [phone] [id number]    Congregation Marital Status          Unknown        Admission Date Admission Type Admitting Provider Attending Provider Department, Room/Bed    19 Elective Tomas Alamo DO  [facility] SURGICAL INPATIENT,     Discharge Date Discharge Disposition Discharge Destination        2019 Home or Self Care              Attending Provider:  (none)   Allergies:  No Known Allergies    Isolation:  None   Infection:  None   Code Status:  Prior    Ht:  162.6 cm (64\")   Wt:  108 kg (237 lb 3.4 oz)    Admission Cmt:  None   Principal Problem:  Incisional hernia [K43.2] More...                 Active Insurance as of 2019     Primary Coverage     Payor Plan Insurance Group Employer/Plan Group    ANTHEM BLUE CROSS [facility] PPO [id number]     Payor Plan Address Payor Plan Phone Number Payor Plan Fax Number Effective Dates    [address] [phone]  2019 - None Entered    [place] [address]       Subscriber Name Subscriber Birth Date Member ID       SAHIL CALDERA 1955 [id number]                 Emergency Contacts      (Rel.) Home Phone Work Phone Mobile Phone    SERAFIN CALDERA \"LOUISA\" (Spouse) -- -- [phone]    Mai Floyd (Daughter) -- -- --               Physician Progress Notes (last 72 hours) (Notes from 19 1046 through 19 1046)      Hayden Mcdowell MD at 19 1153                [facility] Medicine Services Daily Progress " "Note      Hospitalist Team  LOS 4 days      Patient Care Team:  Maegan Mohr MD as PCP - General (Family Medicine)    Patient Location: 4112/1      Subjective   Subjective        Chief Complaint / Subjective  She said she passed the gas, has bowel movement, denies for any nausea or vomiting. No chest pain.     Present on Admission:  **None**      Brief Synopsis of Hospital Course/HPI    Ms. Caldera is a 64 y.o.  presents to [facility] underwent repair of incision hernia with OPEN INCISIONAL HERNIA REPAIR BILATERAL COMPONENT RELEASE WITH MESH. we were asked to see the patient in consult post op for medical management of hypertension, GERD and other chronic medical issues. Patient denies for any chest pain or any short of breath.          Date::          ROS      Objective   Objective      Vital Signs  Temp:  [98.2 °F (36.8 °C)-98.4 °F (36.9 °C)] 98.4 °F (36.9 °C)  Heart Rate:  [83-93] 85  Resp:  [16-18] 17  BP: ()/(65-75) 99/65  Oxygen Therapy  SpO2: 92 %  Pulse Oximetry Type: Intermittent  Device (Oxygen Therapy): room air  Flow (L/min): 2  Flowsheet Rows      First Filed Value   Admission Height  162.6 cm (64\") Documented at 12/02/2019 0617   Admission Weight  108 kg (237 lb 3.4 oz) Documented at 12/02/2019 0617        Intake & Output (last 3 days)       12/03 0701 - 12/04 0700 12/04 0701 - 12/05 0700 12/05 0701 - 12/06 0700 12/06 0701 - 12/07 0700    P.O. 1300 1140 1200 240    I.V. (mL/kg)        Total Intake(mL/kg) 1300 (12) 1140 (10.6) 1200 (11.1) 240 (2.2)    Urine (mL/kg/hr) 2100 (0.8) 2100 (0.8) 1300 (0.5) 300 (0.6)    Drains 185 240 180     Stool   0     Blood        Total Output 2285 2340 1480 300    Net -985 -1200 -280 -60            Stool Unmeasured Occurrence   1 x         Lines, Drains & Airways    Active LDAs     Name:   Placement date:   Placement time:   Site:   Days:    Peripheral IV 12/02/19 0640 Left;Posterior Hand   12/02/19    0640    Hand   1    Closed/Suction " Drain 1 Anterior Abdomen Bulb 19 Fr.   12/02/19    0859    Abdomen   1    Closed/Suction Drain 2 Anterior;Right Abdomen Bulb 15 Fr.   12/02/19    0900    Abdomen   1    Closed/Suction Drain 3 Anterior;Left Abdomen Bulb 15 Fr.   12/02/19    1017    Abdomen   1                  Physical Exam:    Physical Exam   Constitutional: She is oriented to person, place, and time. She appears well-developed and well-nourished. No distress.   HENT:   Head: Normocephalic and atraumatic.   Right Ear: External ear normal.   Left Ear: External ear normal.   Nose: Nose normal.   Mouth/Throat: Oropharynx is clear and moist. No oropharyngeal exudate.   Eyes: Conjunctivae and EOM are normal. Pupils are equal, round, and reactive to light. Right eye exhibits no discharge. Left eye exhibits no discharge. No scleral icterus.   Neck: Normal range of motion. No JVD present. No tracheal deviation present. No thyromegaly present.   Cardiovascular: Normal rate, regular rhythm, normal heart sounds and intact distal pulses. Exam reveals no gallop and no friction rub.   No murmur heard.  Pulmonary/Chest: Effort normal and breath sounds normal. No stridor. No respiratory distress. She has no wheezes. She has no rales. She exhibits no tenderness.   Abdominal: Soft. Bowel sounds are normal. She exhibits no distension and no mass. There is no tenderness. There is no rebound and no guarding. No hernia.   Musculoskeletal: Normal range of motion. She exhibits no edema, tenderness or deformity.   Lymphadenopathy:     She has no cervical adenopathy.   Neurological: She is alert and oriented to person, place, and time. No cranial nerve deficit or sensory deficit. She exhibits normal muscle tone. Coordination normal.   Skin: Skin is warm and dry. No rash noted. She is not diaphoretic. No erythema.   Psychiatric: She has a normal mood and affect. Her behavior is normal.   Nursing note and vitals reviewed.        Procedures:    Procedure(s):  OPEN INCISIONAL  HERNIA REPAIR BILATERAL COMPONENT RELEASE WITH MESH.          Results Review:     I reviewed the patient's new clinical results.      Lab Results (last 24 hours)     ** No results found for the last 24 hours. **        No results found for: HGBA1C                Microbiology Results (last 10 days)     ** No results found for the last 240 hours. **          ECG/EMG Results (most recent)     None                    No radiology results for the last 7 days    Xrays, labs reviewed personally by physician.    Medication Review:   I have reviewed the patient's current medication list      Scheduled Meds    methylnaltrexone 12 mg Subcutaneous Daily   montelukast 10 mg Oral Daily   pantoprazole 40 mg Oral Q AM   ramipril 20 mg Oral QAM       Meds Infusions    sodium chloride 100 mL/hr Last Rate: 100 mL/hr (12/02/19 1240)   sodium chloride 100 mL/hr Last Rate: 100 mL/hr (12/03/19 0815)       Meds PRN  •  aluminum-magnesium hydroxide-simethicone  •  HYDROcodone-acetaminophen  •  HYDROmorphone **AND** naloxone  •  Morphine **AND** naloxone  •  ondansetron **OR** ondansetron  •  oxyCODONE  •  promethazine **OR** promethazine        Assessment/Plan   Assessment/Plan     Active Hospital Problems:  No notes have been filed under this hospital service.  Service: Hospitalist          Resolved Hospital Problems:  No notes have been filed under this hospital service.  Service: Hospitalist    Assessment and Plan.    Status post OPEN INCISIONAL HERNIA REPAIR BILATERAL COMPONENT RELEASE WITH MESH, post op care as per primary, advance diet as per primary, labs reviewed.      Hypertension vital reviewed. .... Continue Ramipril, monitor vitals ... Reviewed.    Mild acute kidney injury resolved now, avoid nephro toxic medication, monitor urine output.      GERD ..... Continue protonix, monitor for symptoms.     Seasonal allergy .... Noted on singular     Dvt prophylaxis with SCD.    Pt medically stable, ok to discharge from my stand point of  view to follow with Family physician in a week time.      VTE Prophylaxis - SCDs.      Code Status -   Code Status and Medical Interventions:   Ordered at: 12/02/19 4855     Code Status:    CPR     Medical Interventions (Level of Support Prior to Arrest):    Full       Discharge Planning    Destination      No service coordination in this encounter.      Durable Medical Equipment      No service coordination in this encounter.      Dialysis/Infusion      No service coordination in this encounter.      Home Medical Care      No service coordination in this encounter.      Therapy      No service coordination in this encounter.      Community Resources      No service coordination in this encounter.            Electronically signed by Hayden Mcdowell MD, 12/06/19, 11:53 AM.  [facility] Hospitalist Team        Electronically signed by Hayden Mcdowell MD at 12/06/19 1150          Discharge Summary      Darrel Evans MD at 12/06/19 1704            Date of Discharge:  12/6/2019    Discharge Diagnosis: Incisional hernia    Presenting Problem/History of Present Illness  Active Hospital Problems    Diagnosis  POA   • **Incisional hernia [K43.2]  Unknown      Resolved Hospital Problems   No resolved problems to display.      Hospital Course  Patient was admitted to the hospital following her planned elective incisional hernia repair which was completed by Dr. Alamo without complication.  Her postoperative course was relatively uneventful.  As her pain was controlled and her bowel function resumed her diet was advanced.  On the day of discharge she is afebrile with normal vital signs pain is controlled with oral pain medication she is passing flatus had a bowel movement yesterday and is ambulatory.  Her MARY drains are serosanguineous.    Procedures Performed    Procedure(s):  OPEN INCISIONAL HERNIA REPAIR BILATERAL COMPONENT RELEASE WITH MESH.  -------------------        Consults:   Consults     Date and Time  Order Name Status Description    12/3/2019 0920 Inpatient Hospitalist Consult      12/2/2019 1154 Inpatient Hospitalist Consult            Condition on Discharge: Satisfactory    Vital Signs  Temp:  [98.2 °F (36.8 °C)-98.7 °F (37.1 °C)] 98.7 °F (37.1 °C)  Heart Rate:  [] 102  Resp:  [16-17] 17  BP: ()/(65-75) 106/72    Physical Exam:  Normocephalic/atraumatic no acute distress  Nonlabored respirations on room air  Abdomen is soft and obese is mildy tender to palpation which is appropriate.  The MARY drains are all serosanguineous.  Discharge Disposition  Home or Self Care    Discharge Medications     Discharge Medications      New Medications      Instructions Start Date   oxyCODONE-acetaminophen  MG per tablet  Commonly known as:  PERCOCET   1 tablet, Oral, Every 4 Hours PRN      polyethylene glycol packet  Commonly known as:  MIRALAX   17 g, Oral, Daily      sulfamethoxazole-trimethoprim 800-160 MG per tablet  Commonly known as:  BACTRIM DS   1 tablet, Oral, 2 Times Daily         Continue These Medications      Instructions Start Date   furosemide 20 MG tablet  Commonly known as:  LASIX   20 mg, Oral, Daily PRN, Do not take day of surgery       montelukast 10 MG tablet  Commonly known as:  SINGULAIR   TAKE 1 TABLET BY MOUTH EVERY DAY      MULTIVITAMIN ADULTS PO   1 tablet, Oral, Daily, Stop 11-25-19 for surgery       potassium chloride 10 MEQ CR tablet  Commonly known as:  K-DUR,KLOR-CON   10 mEq, Oral, Daily PRN      PrilOSEC 10 MG capsule  Generic drug:  omeprazole   Oral, Daily PRN      ramipril 10 MG capsule  Commonly known as:  ALTACE   20 mg, Oral, Every Morning, Do not take 24 hours prior to surgery              Discharge Diet:   Diet Instructions     Diet: Regular      Discharge Diet:  Regular          Activity at Discharge:   Activity Instructions     Lifting Restrictions      Type of Restriction:  Lifting    Lifting Restrictions:  Other    Explain Lifing Restrictions:  No lifting more  than 15 lbs for 4-6 weeks. Or otherwise specified the day of surgery.    Other Activity Instructions      Avoid shower or tub soak until after drain removal.          Follow-up Appointments  No future appointments.  Additional Instructions for the Follow-ups that You Need to Schedule     Discharge Follow-up with Specified Provider: Dr. Alamo, General Surgery. Call [phone] to schedule; 1 Week   As directed      To:  Dr. Alamo, General Surgery. Call [phone] to schedule    Follow Up:  1 Week               Test Results Pending at Discharge       Darrel Evans MD  12/06/19  5:05 PM          Electronically signed by Darrel Evans MD at 12/06/19 3364

## 2019-12-12 ENCOUNTER — OFFICE VISIT (OUTPATIENT)
Dept: SURGERY | Facility: CLINIC | Age: 64
End: 2019-12-12

## 2019-12-12 VITALS
DIASTOLIC BLOOD PRESSURE: 72 MMHG | WEIGHT: 232 LBS | HEART RATE: 76 BPM | SYSTOLIC BLOOD PRESSURE: 133 MMHG | BODY MASS INDEX: 39.61 KG/M2 | OXYGEN SATURATION: 98 % | HEIGHT: 64 IN

## 2019-12-12 DIAGNOSIS — K43.0 INCISIONAL HERNIA WITH OBSTRUCTION BUT NO GANGRENE: Primary | ICD-10-CM

## 2019-12-12 PROCEDURE — 99024 POSTOP FOLLOW-UP VISIT: CPT | Performed by: SURGERY

## 2019-12-12 NOTE — PROGRESS NOTES
SUBJECTIVE:    Mrs. Caldera seen in the office today follow-up from her open incisional hernia repair with bilateral component release and placement of mesh 10 days ago.  Is doing well.  No fevers chills.  She does state that she has had a warm sensation inside after eating.  No nausea or vomiting.    OBJECTIVE:    Incision is healing appropriately without infection.  Staples are not ready to be removed today.  We have removed her 2 Regan drains from the subcu space.  There was less than 20 cc daily out each 1.    ASSESSMENT:    Satisfactory postop progress    PLAN:    Recheck in the office next week for staple removal.  We will refill her Bactrim for another 10 days.

## 2019-12-13 ENCOUNTER — OFFICE VISIT (OUTPATIENT)
Dept: FAMILY MEDICINE CLINIC | Facility: CLINIC | Age: 64
End: 2019-12-13

## 2019-12-13 VITALS
SYSTOLIC BLOOD PRESSURE: 118 MMHG | TEMPERATURE: 97.9 F | HEART RATE: 88 BPM | OXYGEN SATURATION: 98 % | WEIGHT: 233 LBS | DIASTOLIC BLOOD PRESSURE: 79 MMHG | HEIGHT: 64 IN | BODY MASS INDEX: 39.78 KG/M2

## 2019-12-13 DIAGNOSIS — R91.8 PULMONARY NODULES: ICD-10-CM

## 2019-12-13 DIAGNOSIS — K43.0 INCISIONAL HERNIA WITH OBSTRUCTION BUT NO GANGRENE: Primary | ICD-10-CM

## 2019-12-13 PROBLEM — K63.89 SMALL BOWEL MASS: Status: ACTIVE | Noted: 2017-01-24

## 2019-12-13 PROCEDURE — 99213 OFFICE O/P EST LOW 20 MIN: CPT | Performed by: FAMILY MEDICINE

## 2019-12-13 NOTE — PROGRESS NOTES
Chief Complaint   Patient presents with   • Post-op Hernia   • Follow-up       Subjective   Brianna Caldera is a 64 y.o. female. Is here today for hospital follow-up following admission for an OPEN INCISIONAL HERNIA REPAIR at [facility].  She has followed-up with Dr. Alamo.  Mrs. Caldera reports that she is recovering well.  There is not current constipation or issues with eating.  No changes in urinary output.  No fevers or chills.  She denies any neo-operative complications.  She has not been released to work.        I have reviewed and updated her medications, medical history and problem list during today's office visit.       Past Medical History :  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   • Arthralgia 11/21/2013   • Allergic rhinitis 04/19/2013   • Encounter for screening mammogram for malignant neoplasm of breast 06/26/2019   • Fasting hyperglycemia 11/21/2013   • GERD (gastroesophageal reflux disease) 06/26/2019   • Glucose intolerance (impaired glucose tolerance) 06/26/2019   • Morbid obesity (CMS/HCC) 06/26/2019   • Need for immunization against influenza 01/26/2012   • Other hyperlipidemia 06/26/2019   • Encounter for screening 06/26/2019   • Transient ischemic attack 01/26/2012   • Incisional hernia with obstruction but no gangrene 11/07/2019   • Incisional hernia 11/07/2019   • Small bowel mass 01/24/2017   • Small bowel obstruction (CMS/HCC) 12/31/2016     Resolved Ambulatory Problems     Diagnosis Date Noted   • No Resolved Ambulatory Problems     Past Medical History:   Diagnosis Date   • Ankle edema, bilateral    • Cataract    • Detached vitreous humor    • Lesion of lung    • Overweight    • Physical exam, annual    • Prediabetes    • Screening for depression    • Seasonal allergic rhinitis        Medication List:    Current Outpatient Medications:   •  furosemide (LASIX) 20 MG tablet, Take 20 mg by mouth Daily As  Needed. Do not take day of surgery, Disp: , Rfl:   •  montelukast (SINGULAIR) 10 MG tablet, TAKE 1 TABLET BY MOUTH EVERY DAY, Disp: 90 tablet, Rfl: 1  •  Multiple Vitamins-Minerals (MULTIVITAMIN ADULTS PO), Take 1 tablet by mouth Daily. Stop 11-25-19 for surgery, Disp: , Rfl:   •  omeprazole (PRILOSEC) 10 MG capsule, Take  by mouth Daily As Needed., Disp: , Rfl:   •  oxyCODONE-acetaminophen (PERCOCET)  MG per tablet, Take 1 tablet by mouth Every 4 (Four) Hours As Needed for Moderate Pain  for up to 40 doses., Disp: 40 tablet, Rfl: 0  •  polyethylene glycol (MIRALAX) packet, Take 17 g by mouth Daily., Disp: , Rfl:   •  potassium chloride (K-DUR,KLOR-CON) 10 MEQ CR tablet, Take 10 mEq by mouth Daily As Needed., Disp: , Rfl:   •  ramipril (ALTACE) 10 MG capsule, Take 20 mg by mouth Every Morning. Do not take 24 hours prior to surgery, Disp: , Rfl:   •  sulfamethoxazole-trimethoprim (BACTRIM DS) 800-160 MG per tablet, Take 1 tablet by mouth 2 (Two) Times a Day., Disp: 14 tablet, Rfl: 0    Current outpatient and discharge medications have been reconciled for the patient.  Reviewed by: Maegan Mohr MD      Social History     Tobacco Use   • Smoking status: Never Smoker   • Smokeless tobacco: Never Used   Substance Use Topics   • Alcohol use: No     Frequency: Never       Review of Systems   Constitutional: Negative for appetite change, chills and fever.   HENT: Negative for trouble swallowing.    Eyes: Negative for blurred vision (has cataracts, being evaluated.) and visual disturbance.   Respiratory: Negative for shortness of breath.    Cardiovascular: Negative for chest pain, palpitations and leg swelling.   Gastrointestinal: Negative for abdominal distention (epigastric, no longer present), abdominal pain (epigastric, no longer present), constipation (a few months ago, none currently), diarrhea, vomiting, GERD and indigestion.   Genitourinary: Negative for decreased urine volume and difficulty  "urinating.   Skin: Negative for skin lesions.   Neurological: Negative for dizziness and confusion.       Objective   Vitals:    12/13/19 1353   BP: 118/79   Pulse: 88   Temp: 97.9 °F (36.6 °C)   TempSrc: Oral   SpO2: 98%   Weight: 106 kg (233 lb)   Height: 162.6 cm (64.02\")     Body mass index is 39.97 kg/m².  Physical Exam   Constitutional: She is oriented to person, place, and time. She appears well-developed and well-nourished. No distress.   HENT:   Head: Normocephalic and atraumatic.   Right Ear: External ear normal.   Left Ear: External ear normal.   Mouth/Throat: Oropharynx is clear and moist.   Eyes: Pupils are equal, round, and reactive to light. Conjunctivae and EOM are normal. No scleral icterus.   Neck: Normal range of motion. Neck supple. No JVD present. No edema present.   Cardiovascular: Normal rate, regular rhythm and normal heart sounds.   No murmur heard.  Pulmonary/Chest: Effort normal and breath sounds normal.   Abdominal: Soft. Normal appearance and bowel sounds are normal. There is no tenderness. There is no rigidity, no rebound and no guarding.   Musculoskeletal: She exhibits no edema.   Neurological: She is alert and oriented to person, place, and time. She has normal strength. No cranial nerve deficit.   Skin: Skin is warm. Capillary refill takes less than 2 seconds. No rash noted.   Psychiatric: She has a normal mood and affect. Her behavior is normal. Thought content normal.              Lab Results   Component Value Date    GLU 88 10/14/2019    BUN 17 12/03/2019    CREATININE 0.78 12/03/2019    EGFRIFNONA 75 12/03/2019    EGFRIFAFRI 106 10/14/2019     12/03/2019    K 3.7 12/03/2019     12/03/2019    CALCIUM 9.8 12/03/2019    ALBUMIN 3.60 12/03/2019    BILITOT 0.7 12/03/2019    ALKPHOS 116 12/03/2019    AST 17 12/03/2019    ALT 18 12/03/2019    CHLPL 175 10/14/2019    TRIG 141 10/14/2019    HDL 46 10/14/2019    VLDL 28 10/14/2019     (H) 10/14/2019    WBC 10.20 " 12/03/2019    WBC 6.9 10/14/2019    RBC 4.58 12/03/2019    RBC 4.88 10/14/2019    HCT 40.1 12/03/2019    MCV 87.5 12/03/2019    MCH 29.9 12/03/2019    INR 0.96 11/22/2019          Assessment/Plan     Diagnoses and all orders for this visit:    1. Incisional hernia with obstruction but no gangrene (Primary)  Comments:  Recovering well.  No current post-op concerns.  Management per Dr. Alamo.  F/U PRN.    2. Pulmonary nodules  Comments:  Repeat chest CT.  Orders:  -     CT Chest Without Contrast; Future        No follow-ups on file.

## 2019-12-19 ENCOUNTER — OFFICE VISIT (OUTPATIENT)
Dept: SURGERY | Facility: CLINIC | Age: 64
End: 2019-12-19

## 2019-12-19 VITALS
SYSTOLIC BLOOD PRESSURE: 129 MMHG | DIASTOLIC BLOOD PRESSURE: 85 MMHG | HEART RATE: 78 BPM | WEIGHT: 231.8 LBS | HEIGHT: 64 IN | OXYGEN SATURATION: 97 % | BODY MASS INDEX: 39.57 KG/M2 | TEMPERATURE: 97.8 F

## 2019-12-19 DIAGNOSIS — K43.0 INCISIONAL HERNIA WITH OBSTRUCTION BUT NO GANGRENE: Primary | ICD-10-CM

## 2019-12-19 PROCEDURE — 99024 POSTOP FOLLOW-UP VISIT: CPT | Performed by: SURGERY

## 2019-12-19 NOTE — PROGRESS NOTES
SUBJECTIVE:    Mrs. Caldera seen in the office today follow-up from her open incisional hernia repair with bilateral component release and placement of mesh 17 days ago.  She is doing well.  No fevers or chills.  No nausea or vomiting.    OBJECTIVE:    She is healing appropriately without infection.  Staples are removed and Mastisol and Steri-Strips are applied.    ASSESSMENT:    Satisfactory postop progress    PLAN:    Check in the office in 2 weeks.  She is not ready to go back to work yet.

## 2019-12-20 ENCOUNTER — TELEPHONE (OUTPATIENT)
Dept: SURGERY | Facility: CLINIC | Age: 64
End: 2019-12-20

## 2019-12-20 NOTE — TELEPHONE ENCOUNTER
Patient called and requested that all her information including yesterdays office note be faxed to [facility]. I called her back to let her know that Kyle faxed paperwork on 12/09/19 with a estimated return to work date of 02/03/20. I told her that I will refax the paperwork with a copy of yesterdays office note. [facility]

## 2019-12-24 DIAGNOSIS — R91.8 PULMONARY NODULES: ICD-10-CM

## 2019-12-31 ENCOUNTER — OFFICE VISIT (OUTPATIENT)
Dept: SURGERY | Facility: CLINIC | Age: 64
End: 2019-12-31

## 2019-12-31 VITALS
BODY MASS INDEX: 40.46 KG/M2 | OXYGEN SATURATION: 98 % | SYSTOLIC BLOOD PRESSURE: 136 MMHG | DIASTOLIC BLOOD PRESSURE: 89 MMHG | HEIGHT: 64 IN | TEMPERATURE: 97.1 F | HEART RATE: 88 BPM | WEIGHT: 237 LBS

## 2019-12-31 DIAGNOSIS — K43.0 INCISIONAL HERNIA WITH OBSTRUCTION BUT NO GANGRENE: Primary | ICD-10-CM

## 2019-12-31 PROCEDURE — 99024 POSTOP FOLLOW-UP VISIT: CPT | Performed by: SURGERY

## 2019-12-31 NOTE — PROGRESS NOTES
SUBJECTIVE:    Ms. Caldera is seen in the office today follow-up from her open incisional hernia repair with bilateral component release and mesh placement 1 month ago.  She is doing relatively well.    OBJECTIVE:    Incision is healing appropriately without infection    ASSESSMENT:    Continued progress in healing    PLAN:    She has not to go back to work yet.  Recheck in the office in 3 weeks

## 2020-01-21 ENCOUNTER — OFFICE VISIT (OUTPATIENT)
Dept: SURGERY | Facility: CLINIC | Age: 65
End: 2020-01-21

## 2020-01-21 VITALS
DIASTOLIC BLOOD PRESSURE: 91 MMHG | TEMPERATURE: 97 F | HEART RATE: 87 BPM | OXYGEN SATURATION: 97 % | BODY MASS INDEX: 39.71 KG/M2 | SYSTOLIC BLOOD PRESSURE: 160 MMHG | WEIGHT: 232.6 LBS | HEIGHT: 64 IN

## 2020-01-21 DIAGNOSIS — K43.0 INCISIONAL HERNIA WITH OBSTRUCTION BUT NO GANGRENE: Primary | ICD-10-CM

## 2020-01-21 PROCEDURE — 99024 POSTOP FOLLOW-UP VISIT: CPT | Performed by: SURGERY

## 2020-01-21 NOTE — PROGRESS NOTES
SUBJECTIVE:    Ms. Caldera seen in the office today follow-up from her open incisional hernia repair with bilateral component release and mesh placement 7 weeks ago.  She is doing very well.  His complaint is not sleeping through the night.    OBJECTIVE:    Incision is healed appropriately without infection.    ASSESSMENT:    Satisfactory postop progress    PLAN:    Recheck in the office in 1 month.  She has a 20 pound limit lifting for the next month.  She may return to work next week.

## 2020-02-17 ENCOUNTER — OFFICE VISIT (OUTPATIENT)
Dept: SURGERY | Facility: CLINIC | Age: 65
End: 2020-02-17

## 2020-02-17 VITALS
DIASTOLIC BLOOD PRESSURE: 78 MMHG | HEART RATE: 74 BPM | SYSTOLIC BLOOD PRESSURE: 132 MMHG | OXYGEN SATURATION: 98 % | HEIGHT: 64 IN | WEIGHT: 235.6 LBS | TEMPERATURE: 98.4 F | BODY MASS INDEX: 40.22 KG/M2

## 2020-02-17 DIAGNOSIS — K43.0 INCISIONAL HERNIA WITH OBSTRUCTION BUT NO GANGRENE: Primary | ICD-10-CM

## 2020-02-17 PROBLEM — H25.819 COMBINED FORM OF SENILE CATARACT: Status: ACTIVE | Noted: 2019-05-07

## 2020-02-17 PROBLEM — H35.379 EPIRETINAL MEMBRANE: Status: ACTIVE | Noted: 2017-07-31

## 2020-02-17 PROBLEM — H31.009 CHORIORETINAL SCAR: Status: ACTIVE | Noted: 2017-05-11

## 2020-02-17 PROBLEM — H43.819 VITREOUS DEGENERATION: Status: ACTIVE | Noted: 2017-07-31

## 2020-02-17 PROBLEM — H26.8 COMBINED FORM OF NONSENILE CATARACT: Status: ACTIVE | Noted: 2017-04-27

## 2020-02-17 PROBLEM — H43.819 POSTERIOR VITREOUS DETACHMENT: Status: ACTIVE | Noted: 2017-04-27

## 2020-02-17 PROCEDURE — 99024 POSTOP FOLLOW-UP VISIT: CPT | Performed by: SURGERY

## 2020-02-17 NOTE — PROGRESS NOTES
SUBJECTIVE:    Ms. Caldera seen in the office today follow-up from her open incisional hernia repair with bilateral component release and placement of mesh months ago.  She is doing well.  No fevers or chills.  No nausea or vomiting.    OBJECTIVE:    Vision is healing appropriately without infection.  Seems to be good and strong.    ASSESSMENT:    Satisfactory postop progress    PLAN:    Recheck in the office as needed.  She may resume regular activities

## 2020-05-28 RX ORDER — MONTELUKAST SODIUM 10 MG/1
TABLET ORAL
Qty: 90 TABLET | Refills: 1 | Status: SHIPPED | OUTPATIENT
Start: 2020-05-28 | End: 2020-12-18

## 2020-05-28 RX ORDER — RAMIPRIL 10 MG/1
CAPSULE ORAL
Qty: 180 CAPSULE | Refills: 1 | Status: SHIPPED | OUTPATIENT
Start: 2020-05-28 | End: 2020-12-18

## 2020-06-17 ENCOUNTER — TELEPHONE (OUTPATIENT)
Dept: FAMILY MEDICINE CLINIC | Facility: CLINIC | Age: 65
End: 2020-06-17

## 2020-06-17 NOTE — TELEPHONE ENCOUNTER
----- Message from Maegan Mohr MD sent at 12/27/2019 11:00 AM EST -----  Regarding: FW:Lung CT  Contact: [phone]      ----- Message -----  From: Brianna Caldera  Sent: 12/24/2019  12:31 PM EST  To: Chip Machuca  Clinical Pool  Subject: RE:Lung CT                                       The latest were at [facility] several years ago. Thank you and Dinorah Dorman to you and the staff  ----- Message -----  From: Maegan Mohr MD  Sent: 12/24/2019 11:39 AM EST  To: Brianna Caldera  Subject: Lung CT  Your CT results are back.  Can you tell me where your previous lung imaging was done?  It would be helpful to try and get those images to the radiologist at [facility] so that they can compare to the previous scans.  The nodules they have been able to compare (at the base of the lungs) are stable or smaller.  Thanks.  Have a good holiday.  Dr. Mohr.

## 2020-06-24 RX ORDER — NYSTATIN 100000 U/G
CREAM TOPICAL
Qty: 30 G | Refills: 5 | Status: SHIPPED | OUTPATIENT
Start: 2020-06-24 | End: 2021-09-16 | Stop reason: SDUPTHER

## 2020-07-02 RX ORDER — FUROSEMIDE 20 MG/1
20 TABLET ORAL DAILY PRN
Qty: 90 TABLET | Refills: 2 | Status: SHIPPED | OUTPATIENT
Start: 2020-07-02 | End: 2021-04-22

## 2020-07-09 PROCEDURE — U0003 INFECTIOUS AGENT DETECTION BY NUCLEIC ACID (DNA OR RNA); SEVERE ACUTE RESPIRATORY SYNDROME CORONAVIRUS 2 (SARS-COV-2) (CORONAVIRUS DISEASE [COVID-19]), AMPLIFIED PROBE TECHNIQUE, MAKING USE OF HIGH THROUGHPUT TECHNOLOGIES AS DESCRIBED BY CMS-2020-01-R: HCPCS | Performed by: NURSE PRACTITIONER

## 2020-09-28 ENCOUNTER — TELEPHONE (OUTPATIENT)
Dept: FAMILY MEDICINE CLINIC | Facility: CLINIC | Age: 65
End: 2020-09-28

## 2020-09-29 DIAGNOSIS — Z12.31 ENCOUNTER FOR SCREENING MAMMOGRAM FOR MALIGNANT NEOPLASM OF BREAST: ICD-10-CM

## 2020-09-29 DIAGNOSIS — Z78.0 MENOPAUSE: ICD-10-CM

## 2020-09-29 DIAGNOSIS — R73.02 GLUCOSE INTOLERANCE (IMPAIRED GLUCOSE TOLERANCE): ICD-10-CM

## 2020-09-29 DIAGNOSIS — I10 ESSENTIAL HYPERTENSION: Primary | ICD-10-CM

## 2020-09-29 DIAGNOSIS — Z13.220 SCREENING, LIPID: ICD-10-CM

## 2020-09-29 DIAGNOSIS — Z13.820 SCREENING FOR OSTEOPOROSIS: ICD-10-CM

## 2020-09-29 NOTE — TELEPHONE ENCOUNTER
Orders placed for labs, mammogram, and a DEXA if she is agreeable to this.  Can you see if she has a colonoscopy report in [employer]?

## 2020-09-30 ENCOUNTER — RESULTS ENCOUNTER (OUTPATIENT)
Dept: FAMILY MEDICINE CLINIC | Facility: CLINIC | Age: 65
End: 2020-09-30

## 2020-09-30 DIAGNOSIS — R73.02 GLUCOSE INTOLERANCE (IMPAIRED GLUCOSE TOLERANCE): ICD-10-CM

## 2020-09-30 DIAGNOSIS — Z13.220 SCREENING, LIPID: ICD-10-CM

## 2020-09-30 DIAGNOSIS — I10 ESSENTIAL HYPERTENSION: ICD-10-CM

## 2020-09-30 NOTE — TELEPHONE ENCOUNTER
DEXA Scan, Mammo placed in workque, spoke to pt 09/30/20200, 9:15am advised pt they will call and schedule, lab orders placed per Dr. Mohr.  No colonoscopy on file in [employer].

## 2020-10-02 LAB
ALBUMIN SERPL-MCNC: 4.4 G/DL (ref 3.8–4.8)
ALBUMIN/GLOB SERPL: 1.8 {RATIO} (ref 1.2–2.2)
ALP SERPL-CCNC: 145 IU/L (ref 39–117)
ALT SERPL-CCNC: 21 IU/L (ref 0–32)
AST SERPL-CCNC: 13 IU/L (ref 0–40)
BASOPHILS # BLD AUTO: 0.1 X10E3/UL (ref 0–0.2)
BASOPHILS NFR BLD AUTO: 1 %
BILIRUB SERPL-MCNC: 0.6 MG/DL (ref 0–1.2)
BUN SERPL-MCNC: 21 MG/DL (ref 8–27)
BUN/CREAT SERPL: 23 (ref 12–28)
CALCIUM SERPL-MCNC: 11 MG/DL (ref 8.7–10.3)
CHLORIDE SERPL-SCNC: 104 MMOL/L (ref 96–106)
CHOLEST SERPL-MCNC: 150 MG/DL (ref 100–199)
CO2 SERPL-SCNC: 26 MMOL/L (ref 20–29)
CREAT SERPL-MCNC: 0.92 MG/DL (ref 0.57–1)
EOSINOPHIL # BLD AUTO: 0.2 X10E3/UL (ref 0–0.4)
EOSINOPHIL NFR BLD AUTO: 3 %
ERYTHROCYTE [DISTWIDTH] IN BLOOD BY AUTOMATED COUNT: 14.2 % (ref 11.7–15.4)
GLOBULIN SER CALC-MCNC: 2.5 G/DL (ref 1.5–4.5)
GLUCOSE SERPL-MCNC: 122 MG/DL (ref 65–99)
HBA1C MFR BLD: 6.2 % (ref 4.8–5.6)
HCT VFR BLD AUTO: 42.3 % (ref 34–46.6)
HDLC SERPL-MCNC: 42 MG/DL
HGB BLD-MCNC: 13.7 G/DL (ref 11.1–15.9)
IMM GRANULOCYTES # BLD AUTO: 0 X10E3/UL (ref 0–0.1)
IMM GRANULOCYTES NFR BLD AUTO: 0 %
LDLC SERPL CALC-MCNC: 80 MG/DL (ref 0–99)
LYMPHOCYTES # BLD AUTO: 2 X10E3/UL (ref 0.7–3.1)
LYMPHOCYTES NFR BLD AUTO: 30 %
MCH RBC QN AUTO: 28.3 PG (ref 26.6–33)
MCHC RBC AUTO-ENTMCNC: 32.4 G/DL (ref 31.5–35.7)
MCV RBC AUTO: 87 FL (ref 79–97)
MONOCYTES # BLD AUTO: 0.5 X10E3/UL (ref 0.1–0.9)
MONOCYTES NFR BLD AUTO: 7 %
NEUTROPHILS # BLD AUTO: 4.1 X10E3/UL (ref 1.4–7)
NEUTROPHILS NFR BLD AUTO: 59 %
PLATELET # BLD AUTO: 232 X10E3/UL (ref 150–450)
POTASSIUM SERPL-SCNC: 4.6 MMOL/L (ref 3.5–5.2)
PROT SERPL-MCNC: 6.9 G/DL (ref 6–8.5)
RBC # BLD AUTO: 4.84 X10E6/UL (ref 3.77–5.28)
SODIUM SERPL-SCNC: 142 MMOL/L (ref 134–144)
TRIGL SERPL-MCNC: 162 MG/DL (ref 0–149)
TSH SERPL DL<=0.005 MIU/L-ACNC: 2.93 UIU/ML (ref 0.45–4.5)
VLDLC SERPL CALC-MCNC: 28 MG/DL (ref 5–40)
WBC # BLD AUTO: 6.8 X10E3/UL (ref 3.4–10.8)

## 2020-10-08 ENCOUNTER — OFFICE VISIT (OUTPATIENT)
Dept: FAMILY MEDICINE CLINIC | Facility: CLINIC | Age: 65
End: 2020-10-08

## 2020-10-08 VITALS
WEIGHT: 253 LBS | BODY MASS INDEX: 43.19 KG/M2 | OXYGEN SATURATION: 99 % | HEART RATE: 94 BPM | TEMPERATURE: 96.6 F | SYSTOLIC BLOOD PRESSURE: 127 MMHG | DIASTOLIC BLOOD PRESSURE: 78 MMHG | HEIGHT: 64 IN

## 2020-10-08 DIAGNOSIS — R73.02 GLUCOSE INTOLERANCE (IMPAIRED GLUCOSE TOLERANCE): ICD-10-CM

## 2020-10-08 DIAGNOSIS — E78.49 OTHER HYPERLIPIDEMIA: ICD-10-CM

## 2020-10-08 DIAGNOSIS — Z23 NEED FOR TDAP VACCINATION: ICD-10-CM

## 2020-10-08 DIAGNOSIS — I10 ESSENTIAL HYPERTENSION: ICD-10-CM

## 2020-10-08 DIAGNOSIS — Z23 FLU VACCINE NEED: ICD-10-CM

## 2020-10-08 DIAGNOSIS — Z00.00 ANNUAL PHYSICAL EXAM: Primary | ICD-10-CM

## 2020-10-08 LAB
BILIRUB BLD-MCNC: NEGATIVE MG/DL
CLARITY, POC: CLEAR
COLOR UR: YELLOW
GLUCOSE UR STRIP-MCNC: NEGATIVE MG/DL
KETONES UR QL: NEGATIVE
LEUKOCYTE EST, POC: NEGATIVE
NITRITE UR-MCNC: POSITIVE MG/ML
PH UR: 5 [PH] (ref 5–8)
PROT UR STRIP-MCNC: ABNORMAL MG/DL
RBC # UR STRIP: ABNORMAL /UL
SP GR UR: 1.01 (ref 1–1.03)
UROBILINOGEN UR QL: NORMAL

## 2020-10-08 PROCEDURE — 90686 IIV4 VACC NO PRSV 0.5 ML IM: CPT | Performed by: FAMILY MEDICINE

## 2020-10-08 PROCEDURE — 90472 IMMUNIZATION ADMIN EACH ADD: CPT | Performed by: FAMILY MEDICINE

## 2020-10-08 PROCEDURE — 99396 PREV VISIT EST AGE 40-64: CPT | Performed by: FAMILY MEDICINE

## 2020-10-08 PROCEDURE — 90471 IMMUNIZATION ADMIN: CPT | Performed by: FAMILY MEDICINE

## 2020-10-08 PROCEDURE — 90715 TDAP VACCINE 7 YRS/> IM: CPT | Performed by: FAMILY MEDICINE

## 2020-10-08 PROCEDURE — 81002 URINALYSIS NONAUTO W/O SCOPE: CPT | Performed by: FAMILY MEDICINE

## 2020-10-08 NOTE — PROGRESS NOTES
Chief Complaint   Patient presents with   • Annual Exam     Subjective      Brianna Caldera is a 65 y.o. female here for her annual physical with me. Brianna is here for coordination of medical care, to discuss health maintenance, disease prevention as well as to followup on medical problems. Patient's last CPE was last year. Activity level is moderate. Exercises 4 per week. Appetite is good. Feels well with none complaints. Energy level is good. Sleeps fairly well. Patient's last colonoscopy was 6 years. She is advised to repeat in 5 years. Patient's last mammogram was last year. Patient is doing routine self skin exam monthly. Patient is doing routine self-breast exams monthly.    Hypertension  This is a chronic problem. The current episode started more than 1 year ago. The problem is unchanged. The problem is controlled. Pertinent negatives include no anxiety, blurred vision, chest pain or headaches. There are no associated agents to hypertension. Risk factors for coronary artery disease include obesity and stress. Past treatments include lifestyle changes. Current antihypertension treatment includes beta blockers. The current treatment provides moderate improvement. There are no compliance problems.  There is no history of angina, heart failure or PVD. There is no history of chronic renal disease, hypercortisolism, renovascular disease or sleep apnea.     Hyperlipidemia  This is a chronic problem. The current episode started more than 1 year ago. The problem is uncontrolled. Recent lipid tests were reviewed and are variable. She has no history of chronic renal disease. There are no known factors aggravating her hyperlipidemia. Pertinent negatives include no chest pain. Current antihyperlipidemic treatment includes herbal therapy and statins. The current treatment provides mild improvement of lipids. There are no compliance problems.  Risk factors for coronary artery disease include obesity, stress and  hypertension.     The following portions of the patient's history were reviewed and updated as appropriate: allergies, current medications, past family history, past medical history, past social history, past surgical history and problem list.      Past Medical History :  Active Ambulatory Problems     Diagnosis Date Noted   • Hypertension 06/17/2019   • Castleman's disease (CMS/[facility]) 01/25/2012   • Arthralgia 11/21/2013   • Allergic rhinitis 04/19/2013   • Encounter for screening mammogram for malignant neoplasm of breast 06/26/2019   • Fasting hyperglycemia 11/21/2013   • GERD (gastroesophageal reflux disease) 06/26/2019   • Glucose intolerance (impaired glucose tolerance) 06/26/2019   • Morbid obesity (CMS/[facility]) 06/26/2019   • Need for immunization against influenza 01/26/2012   • Other hyperlipidemia 06/26/2019   • Encounter for screening 06/26/2019   • Transient ischemic attack 01/26/2012   • Incisional hernia with obstruction but no gangrene 11/07/2019   • Incisional hernia 11/07/2019   • Small bowel mass 01/24/2017   • Small bowel obstruction (CMS/[facility]) 12/31/2016   • Vitreous degeneration 07/31/2017   • Posterior vitreous detachment 04/27/2017   • Epiretinal membrane 07/31/2017   • Combined form of senile cataract 05/07/2019   • Combined form of nonsenile cataract 04/27/2017   • Chorioretinal scar 05/11/2017     Resolved Ambulatory Problems     Diagnosis Date Noted   • No Resolved Ambulatory Problems     Past Medical History:   Diagnosis Date   • Ankle edema, bilateral    • Cataract    • Detached vitreous humor    • Lesion of lung    • Overweight    • Physical exam, annual    • Prediabetes    • Screening for depression    • Seasonal allergic rhinitis        Medication List:    Current Outpatient Medications:   •  ELDERBERRY PO, Take  by mouth 2 (two) times a day., Disp: , Rfl:   •  furosemide (LASIX) 20 MG tablet, Take 1 tablet by mouth Daily As Needed (swelling)., Disp: 90 tablet, Rfl: 2  •  montelukast  (SINGULAIR) 10 MG tablet, TAKE 1 TABLET BY MOUTH EVERY DAY, Disp: 90 tablet, Rfl: 1  •  Multiple Vitamins-Minerals (MULTIVITAMIN ADULTS PO), Take 1 tablet by mouth Daily. Stop 19 for surgery, Disp: , Rfl:   •  nystatin (MYCOSTATIN) 881236 UNIT/GM cream, APPLY TWICE A DAY FOR RASH, Disp: 30 g, Rfl: 5  •  omeprazole (PRILOSEC) 10 MG capsule, Take  by mouth Daily As Needed., Disp: , Rfl:   •  potassium chloride (K-DUR,KLOR-CON) 10 MEQ CR tablet, Take 10 mEq by mouth Daily As Needed., Disp: , Rfl:   •  ramipril (ALTACE) 10 MG capsule, TAKE 2 CAPSULES BY MOUTH DAILY, Disp: 180 capsule, Rfl: 1    Allergies:  No Known Allergies    Social History:  Social History     Socioeconomic History   • Marital status:      Spouse name: Not on file   • Number of children: Not on file   • Years of education: Not on file   • Highest education level: Not on file   Tobacco Use   • Smoking status: Never Smoker   • Smokeless tobacco: Never Used   Substance and Sexual Activity   • Alcohol use: No     Frequency: Never   • Drug use: No   • Sexual activity: Defer   Lifestyle   • Physical activity     Days per week: 3 days     Minutes per session: 30 min   • Stress: Not at all       Family History:  Family History   Problem Relation Age of Onset   • Colon cancer Mother    • Kidney cancer Father    • Brain cancer Brother    • Breast cancer Maternal Grandmother    • Cancer Maternal Grandfather         Bladder       Past Surgical History:  Past Surgical History:   Procedure Laterality Date   •  SECTION      two   • COLONOSCOPY      polyps removed   • EXPLORATORY LAPAROTOMY      for small bowel obstruction/intussusception   • INCISIONAL HERNIA REPAIR Bilateral    • LAPAROSCOPIC CHOLECYSTECTOMY     • LUNG BIOPSY     • TONSILLECTOMY     • TOTAL ABDOMINAL HYSTERECTOMY WITH SALPINGO OOPHORECTOMY     • VENTRAL/INCISIONAL HERNIA REPAIR N/A 2019    Procedure: OPEN INCISIONAL HERNIA REPAIR BILATERAL  COMPONENT RELEASE WITH MESH.;  Surgeon: Tomas Alamo DO;  Location: [facility] MAIN OR;  Service: General   • WRIST FRACTURE SURGERY      s/p pin placement in 1990 and repair (bone shortening) in 2016 - [facility]       PHQ-2 Depression Screening  Little interest or pleasure in doing things? 0   Feeling down, depressed, or hopeless? 0   PHQ-2 Total Score 0     Health Maintenance   Topic Date Due   • URINE MICROALBUMIN  10/18/2020   • COLONOSCOPY  01/01/2021 (Originally 1955)   • HEPATITIS C SCREENING  10/08/2021 (Originally 6/17/2019)   • ZOSTER VACCINE (1 of 2) 10/08/2021 (Originally 9/20/2007)   • HEMOGLOBIN A1C  04/01/2021   • DIABETIC EYE EXAM  08/25/2021   • LIPID PANEL  10/01/2021   • DIABETIC FOOT EXAM  10/08/2021   • ANNUAL PHYSICAL  10/09/2021   • MAMMOGRAM  10/13/2022   • TDAP/TD VACCINES (2 - Td) 10/08/2030   • INFLUENZA VACCINE  Completed   • Pneumococcal Vaccine 0-64  Aged Out   • PAP SMEAR  Discontinued         Review Of Systems:  Review of Systems   Constitutional: Positive for unexpected weight gain. Negative for activity change, appetite change, chills, fever and unexpected weight loss.   HENT: Negative for congestion, ear pain, hearing loss, sore throat and trouble swallowing.    Eyes: Negative for blurred vision, double vision, pain and visual disturbance.   Respiratory: Negative for cough and shortness of breath.    Cardiovascular: Negative for chest pain, palpitations and leg swelling.   Gastrointestinal: Negative for abdominal pain, blood in stool, constipation, diarrhea, nausea and vomiting.   Endocrine: Negative for heat intolerance, polydipsia and polyuria.   Genitourinary: Negative for breast discharge, breast lump, breast pain, dysuria, hematuria, pelvic pain and vaginal bleeding.   Musculoskeletal: Negative for arthralgias and myalgias.   Skin: Negative for rash and skin lesions.   Allergic/Immunologic: Negative for immunocompromised state.   Neurological: Negative for  "dizziness, tremors, seizures, syncope, facial asymmetry, speech difficulty, weakness, numbness, headache and confusion.   Hematological: Negative for adenopathy. Does not bruise/bleed easily.   Psychiatric/Behavioral: Negative for behavioral problems, suicidal ideas and depressed mood. The patient is not nervous/anxious.          Physical Exam:  Vital Signs:  /78   Pulse 94   Temp 96.6 °F (35.9 °C) (Temporal)   Ht 162.6 cm (64\")   Wt 115 kg (253 lb)   SpO2 99%   BMI 43.43 kg/m²     Physical Exam  Constitutional:       General: She is not in acute distress.     Appearance: Normal appearance. She is well-developed. She is not ill-appearing.   HENT:      Head: Normocephalic and atraumatic.      Right Ear: Tympanic membrane, ear canal and external ear normal.      Left Ear: Tympanic membrane, ear canal and external ear normal.      Nose: Nose normal.      Mouth/Throat:      Mouth: Mucous membranes are moist.      Pharynx: No oropharyngeal exudate or posterior oropharyngeal erythema.   Eyes:      General: No scleral icterus.        Right eye: No discharge.         Left eye: No discharge.      Conjunctiva/sclera: Conjunctivae normal.      Pupils: Pupils are equal, round, and reactive to light.   Neck:      Musculoskeletal: Normal range of motion and neck supple. No edema.      Thyroid: No thyromegaly.      Vascular: No carotid bruit or JVD.   Cardiovascular:      Rate and Rhythm: Normal rate and regular rhythm.      Pulses: Normal pulses.      Heart sounds: Normal heart sounds. No murmur.   Pulmonary:      Effort: Pulmonary effort is normal.      Breath sounds: Normal breath sounds. No wheezing or rales.   Abdominal:      General: There is no distension.      Palpations: Abdomen is soft.      Tenderness: There is no abdominal tenderness. There is no guarding or rebound.   Genitourinary:     Comments: Exam not performed  Musculoskeletal: Normal range of motion.         General: No tenderness.   Lymphadenopathy: "      Cervical: No cervical adenopathy.   Skin:     General: Skin is warm.      Capillary Refill: Capillary refill takes less than 2 seconds.      Findings: No lesion or rash.   Neurological:      General: No focal deficit present.      Mental Status: She is alert and oriented to person, place, and time. Mental status is at baseline.      Cranial Nerves: No cranial nerve deficit.      Motor: No abnormal muscle tone.      Coordination: Coordination normal.      Gait: Gait normal.   Psychiatric:         Attention and Perception: Attention and perception normal.         Mood and Affect: Mood and affect normal.         Speech: Speech normal.         Behavior: Behavior normal.         Thought Content: Thought content normal.         Cognition and Memory: Cognition and memory normal.         Judgment: Judgment normal.         Brief Urine Lab Results  (Last result in the past 365 days)      Color   Clarity   Blood   Leuk Est   Nitrite   Protein   CREAT   Urine HCG        10/08/20 1724 Yellow Clear Large Negative Positive Trace               Lab Results   Component Value Date     (H) 10/01/2020    BUN 21 10/01/2020    CREATININE 0.92 10/01/2020    EGFRIFNONA 66 10/01/2020    EGFRIFAFRI 77 10/01/2020     10/01/2020    K 4.6 10/01/2020     10/01/2020    CALCIUM 11.0 (H) 10/01/2020    ALBUMIN 4.4 10/01/2020    BILITOT 0.6 10/01/2020    ALKPHOS 145 (H) 10/01/2020    AST 13 10/01/2020    ALT 21 10/01/2020    CHLPL 150 10/01/2020    TRIG 162 (H) 10/01/2020    HDL 42 10/01/2020    VLDL 28 10/01/2020    LDL 80 10/01/2020    WBC 6.8 10/01/2020    RBC 4.84 10/01/2020    HCT 42.3 10/01/2020    MCV 87 10/01/2020    MCH 28.3 10/01/2020    TSH 2.930 10/01/2020            Assessment and Plan:  Diagnoses and all orders for this visit:    1. Annual physical exam (Primary)  -     POCT urinalysis dipstick, manual    2. Essential hypertension  Comments:  Well-controlled.  Continue current medications.    3. Other  hyperlipidemia  Comments:  Lipids stable.  Triglycerides elevated.  Dietary modifications recommended.  10-year ASCVD rigk 5.7%.  Not in statin benefit group.    4. Glucose intolerance (impaired glucose tolerance)  Comments:  A1c elevated.  She will continue to watch her diet.  Repeat labs in 6 months.    5. Flu vaccine need  -     FluLaval Quad >6 Months ([id number])    6. Need for Tdap vaccination  -     Tdap Vaccine Greater Than or Equal To 6yo IM    Questions and concerns addressed.  Age appropriate screening discussed.  Recommended laboratory studies ordered.  See orders.  Patient routinely gets screening colonoscopies every 5 years due to family history of colon cancer.  Date of last colonoscopy unknown.  Prior with her gastroenterologist as to when her next colonoscopy is due.  Will try to obtain records.    I wore protective equipment throughout this patient encounter to include mask and eye protection. Hand hygiene was performed before donning protective equipment and after removal when leaving the room.  Patient also wore a mask.

## 2020-10-13 ENCOUNTER — HOSPITAL ENCOUNTER (OUTPATIENT)
Dept: MAMMOGRAPHY | Facility: HOSPITAL | Age: 65
Discharge: HOME | End: 2020-10-13

## 2020-10-13 ENCOUNTER — HOSPITAL ENCOUNTER (OUTPATIENT)
Dept: BONE DENSITY | Facility: HOSPITAL | Age: 65
Discharge: HOME | End: 2020-10-13

## 2020-10-13 DIAGNOSIS — Z13.820 SCREENING FOR OSTEOPOROSIS: ICD-10-CM

## 2020-10-13 DIAGNOSIS — Z12.31 ENCOUNTER FOR SCREENING MAMMOGRAM FOR MALIGNANT NEOPLASM OF BREAST: ICD-10-CM

## 2020-10-13 DIAGNOSIS — Z78.0 MENOPAUSE: ICD-10-CM

## 2020-10-13 PROCEDURE — 77063 BREAST TOMOSYNTHESIS BI: CPT

## 2020-10-13 PROCEDURE — 77067 SCR MAMMO BI INCL CAD: CPT

## 2020-10-13 PROCEDURE — 77080 DXA BONE DENSITY AXIAL: CPT

## 2020-10-16 ENCOUNTER — APPOINTMENT (OUTPATIENT)
Dept: ULTRASOUND IMAGING | Facility: HOSPITAL | Age: 65
End: 2020-10-16

## 2020-11-05 ENCOUNTER — HOSPITAL ENCOUNTER (OUTPATIENT)
Dept: ULTRASOUND IMAGING | Facility: HOSPITAL | Age: 65
Discharge: HOME | End: 2020-11-05
Admitting: FAMILY MEDICINE

## 2020-11-05 DIAGNOSIS — Z13.6 SCREENING FOR CARDIOVASCULAR CONDITION: ICD-10-CM

## 2020-11-05 PROCEDURE — 93799 UNLISTED CV SVC/PROCEDURE: CPT

## 2020-11-06 LAB
BH CV ECHO MEAS - DIST AO DIAM: 1.2 CM
BH CV XLRA MEAS - MID AO DIAM: 2.3 CM
BH CV XLRA MEAS - PAD LEFT ABI DP: 1
BH CV XLRA MEAS - PAD LEFT ABI PT: 0.9
BH CV XLRA MEAS - PAD LEFT ARM: 151 MMHG
BH CV XLRA MEAS - PAD LEFT LEG DP: 152 MMHG
BH CV XLRA MEAS - PAD LEFT LEG PT: 141 MMHG
BH CV XLRA MEAS - PAD RIGHT ABI DP: 1.1
BH CV XLRA MEAS - PAD RIGHT ABI PT: 1.1
BH CV XLRA MEAS - PAD RIGHT ARM: 146 MMHG
BH CV XLRA MEAS - PAD RIGHT LEG DP: 162 MMHG
BH CV XLRA MEAS - PAD RIGHT LEG PT: 171 MMHG
BH CV XLRA MEAS - PROX AO DIAM: 1.9 CM
BH CV XLRA MEAS LEFT ICA/CCA RATIO: 1.1
BH CV XLRA MEAS LEFT MID CCA PSV: 95.9 CM/SEC
BH CV XLRA MEAS LEFT MID ICA PSV: 100.3 CM/SEC
BH CV XLRA MEAS LEFT PROX ECA PSV: 64.1 CM/SEC
BH CV XLRA MEAS RIGHT ICA/CCA RATIO: 0.6
BH CV XLRA MEAS RIGHT MID CCA PSV: 104.9 CM/SEC
BH CV XLRA MEAS RIGHT MID ICA PSV: 58.2 CM/SEC
BH CV XLRA MEAS RIGHT PROX ECA PSV: 61.9 CM/SEC

## 2020-11-25 PROCEDURE — U0003 INFECTIOUS AGENT DETECTION BY NUCLEIC ACID (DNA OR RNA); SEVERE ACUTE RESPIRATORY SYNDROME CORONAVIRUS 2 (SARS-COV-2) (CORONAVIRUS DISEASE [COVID-19]), AMPLIFIED PROBE TECHNIQUE, MAKING USE OF HIGH THROUGHPUT TECHNOLOGIES AS DESCRIBED BY CMS-2020-01-R: HCPCS | Performed by: NURSE PRACTITIONER

## 2020-12-18 RX ORDER — RAMIPRIL 10 MG/1
CAPSULE ORAL
Qty: 180 CAPSULE | Refills: 1 | Status: SHIPPED | OUTPATIENT
Start: 2020-12-18 | End: 2021-06-22

## 2020-12-18 RX ORDER — MONTELUKAST SODIUM 10 MG/1
TABLET ORAL
Qty: 90 TABLET | Refills: 1 | Status: SHIPPED | OUTPATIENT
Start: 2020-12-18 | End: 2021-06-22

## 2021-04-22 RX ORDER — FUROSEMIDE 20 MG/1
20 TABLET ORAL DAILY PRN
Qty: 90 TABLET | Refills: 2 | Status: SHIPPED | OUTPATIENT
Start: 2021-04-22 | End: 2022-04-28

## 2021-06-22 RX ORDER — MONTELUKAST SODIUM 10 MG/1
TABLET ORAL
Qty: 90 TABLET | Refills: 1 | Status: SHIPPED | OUTPATIENT
Start: 2021-06-22 | End: 2022-10-09 | Stop reason: SDUPTHER

## 2021-06-22 RX ORDER — RAMIPRIL 10 MG/1
CAPSULE ORAL
Qty: 180 CAPSULE | Refills: 1 | Status: SHIPPED | OUTPATIENT
Start: 2021-06-22 | End: 2022-10-05 | Stop reason: SDUPTHER

## 2021-09-17 RX ORDER — NYSTATIN 100000 U/G
CREAM TOPICAL 2 TIMES DAILY PRN
Qty: 30 G | Refills: 5 | Status: SHIPPED | OUTPATIENT
Start: 2021-09-17

## 2021-10-27 ENCOUNTER — ON CAMPUS - OUTPATIENT (AMBULATORY)
Dept: URBAN - METROPOLITAN AREA HOSPITAL 2 | Facility: HOSPITAL | Age: 66
End: 2021-10-27
Payer: COMMERCIAL

## 2021-10-27 ENCOUNTER — OFFICE (AMBULATORY)
Dept: URBAN - METROPOLITAN AREA PATHOLOGY 4 | Facility: PATHOLOGY | Age: 66
End: 2021-10-27
Payer: COMMERCIAL

## 2021-10-27 VITALS
HEART RATE: 76 BPM | OXYGEN SATURATION: 96 % | RESPIRATION RATE: 16 BRPM | HEIGHT: 64 IN | SYSTOLIC BLOOD PRESSURE: 175 MMHG | TEMPERATURE: 97.6 F | DIASTOLIC BLOOD PRESSURE: 85 MMHG | RESPIRATION RATE: 12 BRPM | DIASTOLIC BLOOD PRESSURE: 106 MMHG | HEART RATE: 77 BPM | SYSTOLIC BLOOD PRESSURE: 132 MMHG | HEART RATE: 81 BPM | OXYGEN SATURATION: 98 % | OXYGEN SATURATION: 97 % | SYSTOLIC BLOOD PRESSURE: 140 MMHG | HEART RATE: 70 BPM | OXYGEN SATURATION: 100 % | SYSTOLIC BLOOD PRESSURE: 124 MMHG | RESPIRATION RATE: 14 BRPM | DIASTOLIC BLOOD PRESSURE: 88 MMHG | DIASTOLIC BLOOD PRESSURE: 80 MMHG | SYSTOLIC BLOOD PRESSURE: 138 MMHG | DIASTOLIC BLOOD PRESSURE: 78 MMHG | HEART RATE: 74 BPM | DIASTOLIC BLOOD PRESSURE: 77 MMHG | HEART RATE: 75 BPM | HEART RATE: 79 BPM | WEIGHT: 237 LBS | DIASTOLIC BLOOD PRESSURE: 64 MMHG | OXYGEN SATURATION: 99 % | SYSTOLIC BLOOD PRESSURE: 133 MMHG | SYSTOLIC BLOOD PRESSURE: 143 MMHG

## 2021-10-27 DIAGNOSIS — K62.1 RECTAL POLYP: ICD-10-CM

## 2021-10-27 DIAGNOSIS — K64.8 OTHER HEMORRHOIDS: ICD-10-CM

## 2021-10-27 DIAGNOSIS — Z86.010 PERSONAL HISTORY OF COLONIC POLYPS: ICD-10-CM

## 2021-10-27 DIAGNOSIS — K44.9 DIAPHRAGMATIC HERNIA WITHOUT OBSTRUCTION OR GANGRENE: ICD-10-CM

## 2021-10-27 DIAGNOSIS — R13.10 DYSPHAGIA, UNSPECIFIED: ICD-10-CM

## 2021-10-27 DIAGNOSIS — K57.30 DIVERTICULOSIS OF LARGE INTESTINE WITHOUT PERFORATION OR ABS: ICD-10-CM

## 2021-10-27 LAB
GI HISTOLOGY: A. UNSPECIFIED: (no result)
GI HISTOLOGY: PDF REPORT: (no result)

## 2021-10-27 PROCEDURE — 88305 TISSUE EXAM BY PATHOLOGIST: CPT | Mod: 33,Q6 | Performed by: INTERNAL MEDICINE

## 2021-10-27 PROCEDURE — 43235 EGD DIAGNOSTIC BRUSH WASH: CPT | Performed by: INTERNAL MEDICINE

## 2021-10-27 PROCEDURE — 88305 TISSUE EXAM BY PATHOLOGIST: CPT | Mod: Q6,33 | Performed by: INTERNAL MEDICINE

## 2021-10-27 PROCEDURE — 45380 COLONOSCOPY AND BIOPSY: CPT | Mod: 33 | Performed by: INTERNAL MEDICINE

## 2021-10-27 PROCEDURE — 43450 DILATE ESOPHAGUS 1/MULT PASS: CPT | Performed by: INTERNAL MEDICINE

## 2021-10-27 RX ORDER — OMEPRAZOLE 20 MG/1
CAPSULE, DELAYED RELEASE ORAL
Qty: 90 | Refills: 5 | Status: ACTIVE

## 2022-03-01 ENCOUNTER — OFFICE VISIT (OUTPATIENT)
Dept: ORTHOPEDIC SURGERY | Facility: CLINIC | Age: 67
End: 2022-03-01

## 2022-03-01 VITALS
HEIGHT: 64 IN | RESPIRATION RATE: 18 BRPM | HEART RATE: 86 BPM | BODY MASS INDEX: 40.97 KG/M2 | WEIGHT: 240 LBS | OXYGEN SATURATION: 97 %

## 2022-03-01 DIAGNOSIS — M18.12 PRIMARY OSTEOARTHRITIS OF FIRST CARPOMETACARPAL JOINT OF LEFT HAND: ICD-10-CM

## 2022-03-01 DIAGNOSIS — M79.642 LEFT HAND PAIN: Primary | ICD-10-CM

## 2022-03-01 PROCEDURE — 20600 DRAIN/INJ JOINT/BURSA W/O US: CPT | Performed by: FAMILY MEDICINE

## 2022-03-01 PROCEDURE — 99213 OFFICE O/P EST LOW 20 MIN: CPT | Performed by: FAMILY MEDICINE

## 2022-03-01 NOTE — PROGRESS NOTES
Primary Care Sports Medicine Office Visit Note     Patient ID: Brianna Caldera is a 66 y.o. female.    Chief Complaint:  Chief Complaint   Patient presents with   • Left Hand - Pain     HPI:    Ms. Brianna Caldera is a 66 y.o. female who presents to the clinic today for L first MCP pain. 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 Uses tylenol, creams, no improvement. Worse with activity, better with rest.     Past Medical History:   Diagnosis Date   • Ankle edema, bilateral    • Cataract     bilateral   • Detached vitreous humor     Comments: right   • Encounter for screening mammogram for malignant neoplasm of breast    • GERD (gastroesophageal reflux disease)     Impression: Continue OTC medications.   • Glucose intolerance (impaired glucose tolerance)     Impression: With very mild elevation in triglycerides. Improved with diet and weight loss. Continue dietary modifications discussed. No medications needed at this time. Follow-up 6 mo.   • Hypertension     Impression: Stable.   • Lesion of lung     was told has spots on lungs, and was biopsied (looked like cancer), but was benign   • Morbid obesity (HCC)     >40   • Other hyperlipidemia     Impression: I recommended dietary modifications. Try Mediterranean diet. Follow-up 6 mo with fasting labs prior to visit.   • Overweight     >25   • Physical exam, annual     Impression: Questions and concerns addressed. Pap smear no longer needed. Colon cancer screening current. Mammogram ordered. Fasting labs ordered. Follow-up yearly or as needed.   • Prediabetes     diet controlled.  No meds.     • Screening for depression     Negative Depression Screening (4 or less) ()   • Seasonal allergic rhinitis        Past Surgical History:   Procedure Laterality Date   •  SECTION      two   • COLONOSCOPY      polyps removed   • EXPLORATORY LAPAROTOMY      for small bowel obstruction/intussusception   • INCISIONAL HERNIA REPAIR Bilateral    •  "LAPAROSCOPIC CHOLECYSTECTOMY  2001   • LUNG BIOPSY  2003   • TONSILLECTOMY  1967   • TOTAL ABDOMINAL HYSTERECTOMY WITH SALPINGO OOPHORECTOMY  2003   • VENTRAL/INCISIONAL HERNIA REPAIR N/A 12/2/2019    Procedure: OPEN INCISIONAL HERNIA REPAIR BILATERAL COMPONENT RELEASE WITH MESH.;  Surgeon: Tomas Alamo DO;  Location: [facility] MAIN OR;  Service: General   • WRIST FRACTURE SURGERY      s/p pin placement in 1990 and repair (bone shortening) in 2016 - [facility]       Family History   Problem Relation Age of Onset   • Colon cancer Mother    • Kidney cancer Father    • Brain cancer Brother    • Breast cancer Maternal Grandmother    • Cancer Maternal Grandfather         Bladder     Social History     Occupational History   • Not on file   Tobacco Use   • Smoking status: Never Smoker   • Smokeless tobacco: Never Used   Vaping Use   • Vaping Use: Never used   Substance and Sexual Activity   • Alcohol use: No   • Drug use: No   • Sexual activity: Defer      Review of Systems   Constitutional: Negative for activity change and fever.   Respiratory: Negative for cough and shortness of breath.    Cardiovascular: Negative for chest pain.   Gastrointestinal: Negative for constipation, diarrhea, nausea and vomiting.   Musculoskeletal: Positive for arthralgias.   Skin: Negative for color change and rash.   Neurological: Negative for weakness.   Hematological: Does not bruise/bleed easily.     Objective:    Pulse 86   Resp 18   Ht 162.6 cm (64\")   Wt 109 kg (240 lb)   SpO2 97%   BMI 41.20 kg/m²     Physical Examination:  Physical Exam  Vitals and nursing note reviewed.   Constitutional:       General: She is not in acute distress.     Appearance: She is well-developed. She is not diaphoretic.   HENT:      Head: Normocephalic and atraumatic.   Eyes:      Conjunctiva/sclera: Conjunctivae normal.   Pulmonary:      Effort: Pulmonary effort is normal. No respiratory distress.   Skin:     General: Skin is warm.      Capillary " "Refill: Capillary refill takes less than 2 seconds.   Neurological:      Mental Status: She is alert.       Left Hand Exam     Tenderness   Left hand tenderness location: CMC.     Range of Motion   Wrist   Extension: normal   Flexion: normal   Pronation: normal   Supination: normal     Muscle Strength   Wrist extension: 5/5   Wrist flexion: 5/5   :  5/5     Other   Erythema: absent  Sensation: normal  Pulse: present    Comments:  Positive CMC grind test        Imaging and other tests:  Three-view x-ray left hand reveals severe osteoarthritis of the CMC joint, with near complete joint space loss of the left thumb.    Assessment and Plan:    1. Left hand pain  - XR Hand 3+ View Left    2. Primary osteoarthritis of first carpometacarpal joint of left hand    After discussion of risks and benefits, the patient elected to proceed with corticosteroid injection to the left first digit carpometacarpal joint.  The patient tolerated this procedure well without any complaints or problems.  I recommended continuation of conservative management as previous, RTC in 3-6 months or sooner if symptoms recur.    Jeevan CHRISTINE \"Chance\" Dhiraj STANLEY DO, CAQSM  03/15/22  08:18 EDT    Disclaimer: Please note that areas of this note were completed with computer voice recognition software.  Quite often unanticipated grammatical, syntax, homophones, and other interpretive errors are inadvertently transcribed by the computer software. Please excuse any errors that have escaped final proofreading.  "

## 2022-03-09 ENCOUNTER — OFFICE VISIT (OUTPATIENT)
Dept: ORTHOPEDIC SURGERY | Facility: CLINIC | Age: 67
End: 2022-03-09

## 2022-03-09 VITALS
HEART RATE: 71 BPM | WEIGHT: 240 LBS | BODY MASS INDEX: 40.97 KG/M2 | DIASTOLIC BLOOD PRESSURE: 83 MMHG | SYSTOLIC BLOOD PRESSURE: 127 MMHG | HEIGHT: 64 IN

## 2022-03-09 DIAGNOSIS — M75.51 SUBACROMIAL BURSITIS OF RIGHT SHOULDER JOINT: ICD-10-CM

## 2022-03-09 DIAGNOSIS — M25.511 ACUTE PAIN OF RIGHT SHOULDER: Primary | ICD-10-CM

## 2022-03-09 DIAGNOSIS — M75.81 TENDINITIS OF RIGHT ROTATOR CUFF: ICD-10-CM

## 2022-03-09 PROCEDURE — 99214 OFFICE O/P EST MOD 30 MIN: CPT | Performed by: FAMILY MEDICINE

## 2022-03-09 PROCEDURE — 20610 DRAIN/INJ JOINT/BURSA W/O US: CPT | Performed by: FAMILY MEDICINE

## 2022-03-09 RX ORDER — TRIAMCINOLONE ACETONIDE 40 MG/ML
80 INJECTION, SUSPENSION INTRA-ARTICULAR; INTRAMUSCULAR
Status: COMPLETED | OUTPATIENT
Start: 2022-03-09 | End: 2022-03-09

## 2022-03-09 RX ADMIN — TRIAMCINOLONE ACETONIDE 80 MG: 40 INJECTION, SUSPENSION INTRA-ARTICULAR; INTRAMUSCULAR at 22:19

## 2022-03-10 NOTE — PROGRESS NOTES
Procedure   Large Joint Arthrocentesis: R subacromial bursa  Date/Time: 3/9/2022 10:19 PM  Consent given by: patient  Site marked: site marked  Timeout: Immediately prior to procedure a time out was called to verify the correct patient, procedure, equipment, support staff and site/side marked as required   Supporting Documentation  Indications: pain   Procedure Details  Location: shoulder - R subacromial bursa  Preparation: Patient was prepped and draped in the usual sterile fashion  Needle size: 25 G  Approach: posterior  Medications administered: 80 mg triamcinolone acetonide 40 MG/ML (6cc of 1% lidocaine without epinepherine and 2cc of 40mg Kenalog)  Patient tolerance: patient tolerated the procedure well with no immediate complications (Blood loss negligable, pt admits to almost immediate pain reflief post injection with gentle ROM.)

## 2022-03-15 RX ORDER — TRIAMCINOLONE ACETONIDE 40 MG/ML
20 INJECTION, SUSPENSION INTRA-ARTICULAR; INTRAMUSCULAR
Status: COMPLETED | OUTPATIENT
Start: 2022-03-15 | End: 2022-03-15

## 2022-03-15 RX ADMIN — TRIAMCINOLONE ACETONIDE 20 MG: 40 INJECTION, SUSPENSION INTRA-ARTICULAR; INTRAMUSCULAR at 08:19

## 2022-03-15 NOTE — PROGRESS NOTES
Procedure   Small Joint Arthrocentesis: L thumb CMC  Consent given by: patient  Site marked: site marked  Timeout: Immediately prior to procedure a time out was called to verify the correct patient, procedure, equipment, support staff and site/side marked as required   Supporting Documentation  Indications: pain   Procedure Details  Location: thumb - L thumb CMC  Preparation: Patient was prepped and draped in the usual sterile fashion  Needle size: 27 G  Approach: dorsal  Medications administered: 20 mg triamcinolone acetonide 40 MG/ML        Injection date 3/1/22

## 2022-04-28 RX ORDER — FUROSEMIDE 20 MG/1
20 TABLET ORAL DAILY PRN
Qty: 30 TABLET | Refills: 0 | Status: SHIPPED | OUTPATIENT
Start: 2022-04-28 | End: 2022-10-09 | Stop reason: SDUPTHER

## 2022-06-02 RX ORDER — FUROSEMIDE 20 MG/1
20 TABLET ORAL DAILY PRN
Qty: 30 TABLET | Refills: 0 | OUTPATIENT
Start: 2022-06-02

## 2022-10-02 PROBLEM — M85.80 OSTEOPENIA: Status: ACTIVE | Noted: 2022-10-02

## 2022-10-03 NOTE — PROGRESS NOTES
Chief Complaint  Hypertension, Annual Exam, and Allergic Rhinitis (Concerns about memory)    Subjective          Brianna Caldera presents to [facility] for     History of Present Illness  Brianna is a 67-year-old female who presents to the office today to establish care with me.  Her previous primary care provider was Dr. Mohr      Adult Annual Wellness    Social History  Tobacco use -  Never smoker  Alcohol use -  never  Drug use - never    General health habits  Last eye exam - Spring 2022, Dr. Rosen  Last dental exam - Fall 2021    Diet - Regular diet    Weight / BMI  - morbid obesity, BMI 43.4    Exercise - Walking 3 times a week.  1-2 miles each time    Reproductive health -  Sexually active -  yes  Birth control - hysterectomy    Screening/prevention  Last mammogram - 10/5/2020  Last pap smear/ cervical cancer screening: unsure  Colon cancer screening: colonoscopy 10/27/21  Immunizations - up to date on Tdap; needs Prevnar 13 and shingles      Patient presents for follow-up of hypertension  This is an established problem.  Current medications: ramipril    Patient presents for follow-up of prediabetes  This is an established problem.  Last hemoglobin A1c was 6.2% on 10/1/2020.  Interim treatment changes: none  Current medications:- none.      She has concerns about her memory.  In the last few months she has had more than 1 family member report to her that they had had conversations about things and she does not have any recollection of this.  She has not had any difficulty with completing her work and she has not had issues with remembering how to get places or to do things that she has done all of her life.            Brianna Caldera  has a past medical history of Ankle edema, bilateral, Cataract, Colon polyp, Detached vitreous humor, GERD (gastroesophageal reflux disease), Glucose intolerance (impaired glucose tolerance), Headache (1998), Hypertension, Lesion of lung,  Morbid obesity (HCC), Osteopenia, Other hyperlipidemia, Overweight, Prediabetes, and Seasonal allergic rhinitis.      Review of Systems   Constitutional: Positive for fatigue. Negative for fever.   HENT: Negative for congestion, dental problem, ear pain, hearing loss, postnasal drip and sneezing.    Respiratory: Negative for cough and shortness of breath.    Gastrointestinal: Negative for abdominal distention, abdominal pain, blood in stool, constipation, diarrhea, nausea and vomiting.        No heartburn if she takes omeprazole   Endocrine: Positive for polydipsia. Negative for polyphagia and polyuria.   Genitourinary: Negative for dysuria and hematuria.   Musculoskeletal: Negative for arthralgias and myalgias.   Skin: Negative for rash.   Neurological: Negative for dizziness and headaches.   Psychiatric/Behavioral: Negative for dysphoric mood. The patient is not nervous/anxious.         Family History   Problem Relation Age of Onset   • Colon cancer Mother    • Kidney cancer Father    • Brain cancer Brother    • Cancer Brother         Brain tumors   • Breast cancer Maternal Grandmother    • Diabetes Maternal Grandmother    • Cancer Maternal Grandfather         Bladder cancer        Past Surgical History:   Procedure Laterality Date   • ADENOIDECTOMY     • CATARACT EXTRACTION, BILATERAL     •  SECTION      two   • COLONOSCOPY      polyps removed   • COLONOSCOPY  10/27/2021    diverticulosis; 1 polyp; repeat in 7 years ()   • ESOPHAGOSCOPY / EGD  10/27/2021    hiatal hernia, dilation   • EXPLORATORY LAPAROTOMY      for small bowel obstruction/intussusception   • INCISIONAL HERNIA REPAIR Bilateral    • LAPAROSCOPIC CHOLECYSTECTOMY     • LUNG BIOPSY     • TONSILLECTOMY  1967   • TONSILLECTOMY     • TOTAL ABDOMINAL HYSTERECTOMY WITH SALPINGO OOPHORECTOMY     • VENTRAL/INCISIONAL HERNIA REPAIR N/A 2019    Procedure: OPEN INCISIONAL HERNIA REPAIR BILATERAL COMPONENT  "RELEASE WITH MESH.;  Surgeon: Tomas Alamo DO;  Location: [facility] MAIN OR;  Service: General   • WRIST FRACTURE SURGERY      s/p pin placement in 1990 and repair (bone shortening) in 2016 - [facility]lin[facility] and Josie        Social History     Socioeconomic History   • Marital status:      Spouse name: aMnan   Tobacco Use   • Smoking status: Never   • Smokeless tobacco: Never   Vaping Use   • Vaping Use: Never used   Substance and Sexual Activity   • Alcohol use: No   • Drug use: No   • Sexual activity: Yes     Partners: Male     Birth control/protection: Hysterectomy        Objective       Current Outpatient Medications:   •  ascorbic acid (VITAMIN C) 1000 MG tablet, Take 1,000 mg by mouth Daily., Disp: , Rfl:   •  ELDERBERRY PO, Take  by mouth 2 (two) times a day., Disp: , Rfl:   •  furosemide (LASIX) 20 MG tablet, Take 1 tablet by mouth Daily As Needed (swelling). Appointment needed for additional refills., Disp: 30 tablet, Rfl: 0  •  montelukast (SINGULAIR) 10 MG tablet, TAKE 1 TABLET BY MOUTH EVERY DAY, Disp: 90 tablet, Rfl: 1  •  Multiple Vitamins-Minerals (MULTIVITAMIN ADULTS PO), Take 1 tablet by mouth Daily. Stop 11-25-19 for surgery, Disp: , Rfl:   •  nystatin (MYCOSTATIN) 354026 UNIT/GM cream, Apply  topically to the appropriate area as directed 2 (Two) Times a Day As Needed (rash)., Disp: 30 g, Rfl: 5  •  omeprazole (prilOSEC) 10 MG capsule, Take  by mouth Daily As Needed., Disp: , Rfl:   •  potassium chloride (K-DUR,KLOR-CON) 10 MEQ CR tablet, Take 10 mEq by mouth Daily As Needed., Disp: , Rfl:   •  ramipril (ALTACE) 10 MG capsule, Take 2 capsules by mouth Daily., Disp: 180 capsule, Rfl: 1    Vital Signs:      /85 (BP Location: Right arm, Patient Position: Sitting, Cuff Size: Large Adult)   Pulse 73   Temp 97.2 °F (36.2 °C) (Infrared)   Resp 16   Ht 162.6 cm (64\")   Wt 115 kg (253 lb)   SpO2 100%   BMI 43.43 kg/m²     Vitals:    10/05/22 1457   BP: 126/85   BP Location: Right arm   Patient " "Position: Sitting   Cuff Size: Large Adult   Pulse: 73   Resp: 16   Temp: 97.2 °F (36.2 °C)   TempSrc: Infrared   SpO2: 100%   Weight: 115 kg (253 lb)   Height: 162.6 cm (64\")      Physical Exam  Vitals reviewed.   Constitutional:       General: She is not in acute distress.     Appearance: Normal appearance. She is obese.   HENT:      Head: Normocephalic and atraumatic.      Right Ear: Tympanic membrane, ear canal and external ear normal.      Left Ear: Tympanic membrane, ear canal and external ear normal.      Nose: Nose normal.      Mouth/Throat:      Mouth: Mucous membranes are moist.      Pharynx: Oropharynx is clear.   Eyes:      General: No scleral icterus.     Conjunctiva/sclera: Conjunctivae normal.   Neck:      Thyroid: No thyromegaly.   Cardiovascular:      Rate and Rhythm: Normal rate and regular rhythm.      Heart sounds: Normal heart sounds.   Pulmonary:      Effort: Pulmonary effort is normal. No respiratory distress.      Breath sounds: Normal breath sounds. No wheezing.   Abdominal:      General: Bowel sounds are normal.      Palpations: Abdomen is soft. There is no mass.      Tenderness: There is no abdominal tenderness. There is no guarding or rebound.   Musculoskeletal:      Cervical back: Neck supple.      Right lower leg: No edema.      Left lower leg: No edema.   Lymphadenopathy:      Cervical: No cervical adenopathy.   Skin:     General: Skin is warm and dry.   Neurological:      Mental Status: She is alert and oriented to person, place, and time.   Psychiatric:         Mood and Affect: Mood normal.        Result Review :                  PHQ-9 Total Score: 0               Assessment and Plan    Diagnoses and all orders for this visit:    1. Wellness examination (Primary)  Assessment & Plan:  Discussed preventative health care, needed immunizations, needed screenings. Labs ordered.  DEXA and mammogram ordered.  Prevnar 13 vaccine given    Orders:  -     CBC Auto Differential  -     " Comprehensive Metabolic Panel  -     Lipid Panel    2. Primary hypertension  Assessment & Plan:  Hypertension is stable.  Weight loss.  Continue current medications.  Blood pressure will be reassessed at the next regular appointment.  Continue ramipril 10 mg 2 capsules daily.    Orders:  -     ramipril (ALTACE) 10 MG capsule; Take 2 capsules by mouth Daily.  Dispense: 180 capsule; Refill: 1    3. Mixed hyperlipidemia  Assessment & Plan:  Continue lifestyle changes.  Recheck fasting lipid panel      4. Prediabetes  Assessment & Plan:  Continue lifestyle changes.  Recheck HgbA1c    Orders:  -     Hemoglobin A1c    5. Impaired cognition  Comments:  Will initiate work-up with blood work.  Orders:  -     RPR, Rfx Qn RPR / Confirm TP  -     Vitamin B12  -     Folate    6. Seasonal allergic rhinitis due to pollen  Assessment & Plan:  Continue montelukast 10 mg daily.      7. Osteopenia, unspecified location  Overview:  per DEXA 2020    Assessment & Plan:  Repeat a DEXA scan    Orders:  -     Vitamin D 1,25 Dihydroxy  -     DEXA Bone Density Axial    8. Fatigue, unspecified type  -     TSH  -     T4, Free    9. Morbid obesity (HCC)  Assessment & Plan:  Patient's (Body mass index is 43.43 kg/m².) indicates that they are morbidly obese (BMI > 40 or > 35 with obesity - related health condition) with health conditions that include hypertension . Weight is worsening. BMI is is above average; BMI management plan is completed. We discussed portion control and increasing exercise.       10. Encounter for screening mammogram for malignant neoplasm of breast  -     Mammo Screening Digital Tomosynthesis Bilateral With CAD    11. Encounter for screening for other disorder  -     Hepatitis C Antibody    12. Pneumococcal vaccine administered  -     Pneumococcal Conjugate Vaccine 13-Valent All           Follow Up   Return in about 6 months (around 4/5/2023) for Recheck.  Patient was given instructions and counseling regarding her  condition or for health maintenance advice. Please see specific information pulled into the AVS if appropriate.    Patient Instructions   Follow up by phone/portal (My Chart) when lab results received.

## 2022-10-05 ENCOUNTER — OFFICE VISIT (OUTPATIENT)
Dept: FAMILY MEDICINE CLINIC | Facility: CLINIC | Age: 67
End: 2022-10-05

## 2022-10-05 VITALS
WEIGHT: 253 LBS | RESPIRATION RATE: 16 BRPM | BODY MASS INDEX: 43.19 KG/M2 | HEIGHT: 64 IN | OXYGEN SATURATION: 100 % | HEART RATE: 73 BPM | TEMPERATURE: 97.2 F | SYSTOLIC BLOOD PRESSURE: 126 MMHG | DIASTOLIC BLOOD PRESSURE: 85 MMHG

## 2022-10-05 DIAGNOSIS — Z12.31 ENCOUNTER FOR SCREENING MAMMOGRAM FOR MALIGNANT NEOPLASM OF BREAST: ICD-10-CM

## 2022-10-05 DIAGNOSIS — E66.01 MORBID OBESITY: ICD-10-CM

## 2022-10-05 DIAGNOSIS — Z13.89 ENCOUNTER FOR SCREENING FOR OTHER DISORDER: ICD-10-CM

## 2022-10-05 DIAGNOSIS — I10 PRIMARY HYPERTENSION: ICD-10-CM

## 2022-10-05 DIAGNOSIS — R73.03 PREDIABETES: ICD-10-CM

## 2022-10-05 DIAGNOSIS — Z00.00 WELLNESS EXAMINATION: Primary | ICD-10-CM

## 2022-10-05 DIAGNOSIS — E78.2 MIXED HYPERLIPIDEMIA: ICD-10-CM

## 2022-10-05 DIAGNOSIS — M85.80 OSTEOPENIA, UNSPECIFIED LOCATION: ICD-10-CM

## 2022-10-05 DIAGNOSIS — R41.89 IMPAIRED COGNITION: ICD-10-CM

## 2022-10-05 DIAGNOSIS — R53.83 FATIGUE, UNSPECIFIED TYPE: ICD-10-CM

## 2022-10-05 DIAGNOSIS — Z23 PNEUMOCOCCAL VACCINE ADMINISTERED: ICD-10-CM

## 2022-10-05 DIAGNOSIS — J30.1 SEASONAL ALLERGIC RHINITIS DUE TO POLLEN: ICD-10-CM

## 2022-10-05 PROCEDURE — 90670 PCV13 VACCINE IM: CPT | Performed by: NURSE PRACTITIONER

## 2022-10-05 PROCEDURE — 99397 PER PM REEVAL EST PAT 65+ YR: CPT | Performed by: NURSE PRACTITIONER

## 2022-10-05 PROCEDURE — 90471 IMMUNIZATION ADMIN: CPT | Performed by: NURSE PRACTITIONER

## 2022-10-05 PROCEDURE — 99214 OFFICE O/P EST MOD 30 MIN: CPT | Performed by: NURSE PRACTITIONER

## 2022-10-05 RX ORDER — RAMIPRIL 10 MG/1
20 CAPSULE ORAL DAILY
Qty: 180 CAPSULE | Refills: 1 | Status: SHIPPED | OUTPATIENT
Start: 2022-10-05 | End: 2023-03-27 | Stop reason: SDUPTHER

## 2022-10-09 PROBLEM — Z00.00 WELLNESS EXAMINATION: Status: ACTIVE | Noted: 2019-06-26

## 2022-10-09 PROBLEM — E78.2 MIXED HYPERLIPIDEMIA: Status: ACTIVE | Noted: 2019-06-26

## 2022-10-09 PROBLEM — R73.03 PREDIABETES: Status: ACTIVE | Noted: 2022-10-09

## 2022-10-09 RX ORDER — FUROSEMIDE 20 MG/1
20 TABLET ORAL DAILY PRN
Qty: 90 TABLET | Refills: 0 | Status: SHIPPED | OUTPATIENT
Start: 2022-10-09 | End: 2023-01-03 | Stop reason: SDUPTHER

## 2022-10-09 RX ORDER — MONTELUKAST SODIUM 10 MG/1
TABLET ORAL
Qty: 90 TABLET | Refills: 1 | Status: SHIPPED | OUTPATIENT
Start: 2022-10-09 | End: 2023-03-27 | Stop reason: SDUPTHER

## 2022-10-09 NOTE — ASSESSMENT & PLAN NOTE
Patient's (Body mass index is 43.43 kg/m².) indicates that they are morbidly obese (BMI > 40 or > 35 with obesity - related health condition) with health conditions that include hypertension . Weight is worsening. BMI is is above average; BMI management plan is completed. We discussed portion control and increasing exercise.

## 2022-10-09 NOTE — ASSESSMENT & PLAN NOTE
Hypertension is stable.  Weight loss.  Continue current medications.  Blood pressure will be reassessed at the next regular appointment.  Continue ramipril 10 mg 2 capsules daily.

## 2022-10-09 NOTE — ASSESSMENT & PLAN NOTE
Discussed preventative health care, needed immunizations, needed screenings. Labs ordered.  DEXA and mammogram ordered.  Prevnar 13 vaccine given

## 2022-10-12 LAB
1,25(OH)2D SERPL-MCNC: 69.5 PG/ML (ref 24.8–81.5)
ALBUMIN SERPL-MCNC: 4.6 G/DL (ref 3.8–4.8)
ALBUMIN/GLOB SERPL: 1.9 {RATIO} (ref 1.2–2.2)
ALP SERPL-CCNC: 145 IU/L (ref 44–121)
ALT SERPL-CCNC: 24 IU/L (ref 0–32)
AST SERPL-CCNC: 18 IU/L (ref 0–40)
BASOPHILS # BLD AUTO: 0 X10E3/UL (ref 0–0.2)
BASOPHILS NFR BLD AUTO: 1 %
BILIRUB SERPL-MCNC: 0.6 MG/DL (ref 0–1.2)
BUN SERPL-MCNC: 24 MG/DL (ref 8–27)
BUN/CREAT SERPL: 24 (ref 12–28)
CALCIUM SERPL-MCNC: 11.7 MG/DL (ref 8.7–10.3)
CHLORIDE SERPL-SCNC: 102 MMOL/L (ref 96–106)
CHOLEST SERPL-MCNC: 185 MG/DL (ref 100–199)
CO2 SERPL-SCNC: 25 MMOL/L (ref 20–29)
CREAT SERPL-MCNC: 1 MG/DL (ref 0.57–1)
EGFRCR SERPLBLD CKD-EPI 2021: 63 ML/MIN/1.73
EOSINOPHIL # BLD AUTO: 0.2 X10E3/UL (ref 0–0.4)
EOSINOPHIL NFR BLD AUTO: 3 %
ERYTHROCYTE [DISTWIDTH] IN BLOOD BY AUTOMATED COUNT: 14.3 % (ref 11.7–15.4)
FOLATE SERPL-MCNC: >20 NG/ML
GLOBULIN SER CALC-MCNC: 2.4 G/DL (ref 1.5–4.5)
GLUCOSE SERPL-MCNC: 132 MG/DL (ref 70–99)
HBA1C MFR BLD: 6.6 % (ref 4.8–5.6)
HCT VFR BLD AUTO: 42.2 % (ref 34–46.6)
HCV AB S/CO SERPL IA: <0.1 S/CO RATIO (ref 0–0.9)
HDLC SERPL-MCNC: 49 MG/DL
HGB BLD-MCNC: 13.9 G/DL (ref 11.1–15.9)
IMM GRANULOCYTES # BLD AUTO: 0 X10E3/UL (ref 0–0.1)
IMM GRANULOCYTES NFR BLD AUTO: 0 %
LDLC SERPL CALC-MCNC: 105 MG/DL (ref 0–99)
LYMPHOCYTES # BLD AUTO: 1.7 X10E3/UL (ref 0.7–3.1)
LYMPHOCYTES NFR BLD AUTO: 26 %
MCH RBC QN AUTO: 29.4 PG (ref 26.6–33)
MCHC RBC AUTO-ENTMCNC: 32.9 G/DL (ref 31.5–35.7)
MCV RBC AUTO: 89 FL (ref 79–97)
MONOCYTES # BLD AUTO: 0.4 X10E3/UL (ref 0.1–0.9)
MONOCYTES NFR BLD AUTO: 7 %
NEUTROPHILS # BLD AUTO: 4 X10E3/UL (ref 1.4–7)
NEUTROPHILS NFR BLD AUTO: 63 %
PLATELET # BLD AUTO: 221 X10E3/UL (ref 150–450)
POTASSIUM SERPL-SCNC: 4.9 MMOL/L (ref 3.5–5.2)
PROT SERPL-MCNC: 7 G/DL (ref 6–8.5)
RBC # BLD AUTO: 4.73 X10E6/UL (ref 3.77–5.28)
RPR SER QL: NON REACTIVE
SODIUM SERPL-SCNC: 140 MMOL/L (ref 134–144)
T4 FREE SERPL-MCNC: 1.51 NG/DL (ref 0.82–1.77)
TRIGL SERPL-MCNC: 177 MG/DL (ref 0–149)
TSH SERPL DL<=0.005 MIU/L-ACNC: 2.58 UIU/ML (ref 0.45–4.5)
VIT B12 SERPL-MCNC: 566 PG/ML (ref 232–1245)
VLDLC SERPL CALC-MCNC: 31 MG/DL (ref 5–40)
WBC # BLD AUTO: 6.3 X10E3/UL (ref 3.4–10.8)

## 2022-10-13 DIAGNOSIS — E83.52 HYPERCALCEMIA: Primary | ICD-10-CM

## 2022-10-13 DIAGNOSIS — R74.8 ALKALINE PHOSPHATASE RAISED: ICD-10-CM

## 2022-10-18 ENCOUNTER — HOSPITAL ENCOUNTER (OUTPATIENT)
Dept: BONE DENSITY | Facility: HOSPITAL | Age: 67
Discharge: HOME | End: 2022-10-18

## 2022-10-18 ENCOUNTER — HOSPITAL ENCOUNTER (OUTPATIENT)
Dept: MAMMOGRAPHY | Facility: HOSPITAL | Age: 67
Discharge: HOME | End: 2022-10-18

## 2022-10-18 PROCEDURE — 77067 SCR MAMMO BI INCL CAD: CPT

## 2022-10-18 PROCEDURE — 77080 DXA BONE DENSITY AXIAL: CPT

## 2022-10-18 PROCEDURE — 77063 BREAST TOMOSYNTHESIS BI: CPT

## 2022-10-19 LAB
CA-I SERPL ISE-MCNC: 6.4 MG/DL (ref 4.5–5.6)
GGT SERPL-CCNC: 17 IU/L (ref 0–60)
PTH-INTACT SERPL-MCNC: 93 PG/ML (ref 15–65)

## 2022-10-19 NOTE — PROGRESS NOTES
Let her know her DEXA scan shows osteopenia (thinning bone)  She needs to get a daily intake of calcium 1200 mg and Vitamin D 1000 IU daily.  Weight bearing exercises (walking) for bone health.  Repeat DEXA in 2 years.

## 2022-10-20 NOTE — PROGRESS NOTES
Let her know:  1.  GGT is normal  2.  Ionized calcium is high  3.  PTH (parathyroid hormone) is high  4.  Due to the ionized calcium and PTH being elevated she probably has primary hyperparathyroidism.  5.  I would like her to see endocrinology for further evaluation.   6.  Send this back to me after you talk to her and I will put in the referral.  Does she have a specific endocrinologist that she would like to see?

## 2022-10-21 DIAGNOSIS — E83.52 HYPERCALCEMIA: Primary | ICD-10-CM

## 2023-01-03 RX ORDER — FUROSEMIDE 20 MG/1
20 TABLET ORAL DAILY PRN
Qty: 90 TABLET | Refills: 0 | Status: SHIPPED | OUTPATIENT
Start: 2023-01-03 | End: 2023-03-27 | Stop reason: SDUPTHER

## 2023-01-16 NOTE — PROGRESS NOTES
Chief Complaint  Diabetes, Hypertension, and Hyperlipidemia    Subjective          Brianna Caldera presents to [facility] for     History of Present Illness  Brianna is a 67 year old female.      Patient presents for follow-up of diabetes  This is an established problem.  Improved.  hemoglobin A1c was 6.6 % on 10/10/22  Hgba1c 5.6 % today  Interim treatment changes: making lifestyle changes.  She has lost 24 lbs since last visit.  Current medications: none    Patient presents for follow-up of HTN  This is an established problem.  Interim treatment changes: none  Current medications: ramipril 10 mg 2 tablets daily.       Patient presents for follow-up of hyperlipidemia  This is an established problem  Interim treatment changes: none  Current medications: none  Last lipid panel 10/10/22    She has an appt on 2/9/23 with endocrinology for hypercalcemia.    Tips of fingers feel tingly and numb.  Skin looks pale/white.  Lasts maybe 5 minutes  No pain  Has happened maybe 1-2 times per month.        Brianna Caldera  has a past medical history of Ankle edema, bilateral, Cataract, Colon polyp, Detached vitreous humor, GERD (gastroesophageal reflux disease), Glucose intolerance (impaired glucose tolerance), Headache (1998), Hypertension, Lesion of lung, Morbid obesity (HCC), Osteopenia, Other hyperlipidemia, Overweight, Prediabetes, and Seasonal allergic rhinitis.      Review of Systems   Constitutional: Negative for fatigue and fever.        + weight loss (trying to lose)   HENT: Negative for ear pain and sore throat.    Respiratory: Negative for cough and shortness of breath.    Cardiovascular: Negative for chest pain, palpitations and leg swelling.   Gastrointestinal: Negative for abdominal pain.   Endocrine: Negative for polydipsia, polyphagia and polyuria.   Neurological: Negative for dizziness, light-headedness and headaches.        Family History   Problem Relation Age of Onset   •  Colon cancer Mother    • Kidney cancer Father    • Brain cancer Brother    • Cancer Brother         Brain tumors   • Breast cancer Maternal Grandmother    • Diabetes Maternal Grandmother    • Cancer Maternal Grandfather         Bladder cancer   • Ovarian cancer Neg Hx         Past Surgical History:   Procedure Laterality Date   • ADENOIDECTOMY     • CATARACT EXTRACTION, BILATERAL     •  SECTION      two   • COLONOSCOPY      polyps removed   • COLONOSCOPY  10/27/2021    diverticulosis; 1 polyp; repeat in 7 years ()   • ESOPHAGOSCOPY / EGD  10/27/2021    hiatal hernia, dilation   • EXPLORATORY LAPAROTOMY      for small bowel obstruction/intussusception   • INCISIONAL HERNIA REPAIR Bilateral    • LAPAROSCOPIC CHOLECYSTECTOMY     • LUNG BIOPSY     • TONSILLECTOMY     • TONSILLECTOMY     • TOTAL ABDOMINAL HYSTERECTOMY WITH SALPINGO OOPHORECTOMY     • VENTRAL/INCISIONAL HERNIA REPAIR N/A 2019    Procedure: OPEN INCISIONAL HERNIA REPAIR BILATERAL COMPONENT RELEASE WITH MESH.;  Surgeon: Tomas Alamo DO;  Location: [facility] MAIN OR;  Service: General   • WRIST FRACTURE SURGERY      s/p pin placement in  and repair (bone shortening) in  - [place] and [place]ie        Social History     Socioeconomic History   • Marital status:      Spouse name: Manan   Tobacco Use   • Smoking status: Never   • Smokeless tobacco: Never   Vaping Use   • Vaping Use: Never used   Substance and Sexual Activity   • Alcohol use: No   • Drug use: No   • Sexual activity: Yes     Partners: Male     Birth control/protection: Hysterectomy        Objective       Current Outpatient Medications:   •  ascorbic acid (VITAMIN C) 1000 MG tablet, Take 1,000 mg by mouth Daily., Disp: , Rfl:   •  furosemide (LASIX) 20 MG tablet, Take 1 tablet by mouth Daily As Needed (swelling)., Disp: 90 tablet, Rfl: 0  •  montelukast (SINGULAIR) 10 MG tablet, 1 tablet by mouth at bedtime, Disp: 90 tablet, Rfl: 1  •   "Multiple Vitamins-Minerals (MULTIVITAMIN ADULTS PO), Take 1 tablet by mouth Daily. Stop 11-25-19 for surgery, Disp: , Rfl:   •  nystatin (MYCOSTATIN) 254925 UNIT/GM cream, Apply  topically to the appropriate area as directed 2 (Two) Times a Day As Needed (rash)., Disp: 30 g, Rfl: 5  •  omeprazole (prilOSEC) 10 MG capsule, Take  by mouth Daily As Needed., Disp: , Rfl:   •  potassium chloride (K-DUR,KLOR-CON) 10 MEQ CR tablet, Take 10 mEq by mouth Daily As Needed., Disp: , Rfl:   •  ramipril (ALTACE) 10 MG capsule, Take 2 capsules by mouth Daily., Disp: 180 capsule, Rfl: 1    Vital Signs:      /77 (BP Location: Right arm, Patient Position: Sitting, Cuff Size: Large Adult)   Pulse 78   Temp 97.3 °F (36.3 °C) (Infrared)   Resp 16   Ht 162.6 cm (64\")   Wt 104 kg (229 lb)   SpO2 98%   BMI 39.31 kg/m²     Vitals:    01/18/23 0751   BP: 114/77   BP Location: Right arm   Patient Position: Sitting   Cuff Size: Large Adult   Pulse: 78   Resp: 16   Temp: 97.3 °F (36.3 °C)   TempSrc: Infrared   SpO2: 98%   Weight: 104 kg (229 lb)   Height: 162.6 cm (64\")      Physical Exam  Vitals reviewed.   Constitutional:       General: She is not in acute distress.     Appearance: Normal appearance. She is obese.   HENT:      Head: Normocephalic and atraumatic.      Right Ear: Tympanic membrane, ear canal and external ear normal.      Left Ear: Tympanic membrane, ear canal and external ear normal.      Mouth/Throat:      Mouth: Mucous membranes are moist.      Pharynx: Oropharynx is clear.   Eyes:      General: No scleral icterus.     Conjunctiva/sclera: Conjunctivae normal.   Neck:      Thyroid: No thyromegaly.   Cardiovascular:      Rate and Rhythm: Normal rate and regular rhythm.      Pulses:           Radial pulses are 2+ on the right side and 2+ on the left side.      Heart sounds: Normal heart sounds.   Pulmonary:      Effort: Pulmonary effort is normal. No respiratory distress.      Breath sounds: Normal breath sounds. No " wheezing.   Musculoskeletal:      Cervical back: Neck supple.      Right lower leg: No edema.      Left lower leg: No edema.   Lymphadenopathy:      Cervical: No cervical adenopathy.   Skin:     General: Skin is warm and dry.      Capillary Refill: Capillary refill takes less than 2 seconds.      Coloration: Skin is not cyanotic.   Neurological:      Mental Status: She is alert and oriented to person, place, and time.   Psychiatric:         Mood and Affect: Mood normal.        Result Review :     A1C Last 3 Results    HGBA1C Last 3 Results 10/10/22 1/18/23   Hemoglobin A1C 6.6 (A) 5.6   (A) Abnormal value       Comments are available for some flowsheets but are not being displayed.           Office Visit on 01/18/2023   Component Date Value Ref Range Status   • Hemoglobin A1C 01/18/2023 5.6  % Final   • Lot Number 01/18/2023 NA   Final   • Expiration Date 01/18/2023 NA   Final   Orders Only on 10/13/2022   Component Date Value Ref Range Status   • GGT 10/18/2022 17  0 - 60 IU/L Final   • Ionized Calcium 10/18/2022 6.4 (H)  4.5 - 5.6 mg/dL Final   • PTH, Intact 10/18/2022 93 (H)  15 - 65 pg/mL Final   Office Visit on 10/05/2022   Component Date Value Ref Range Status   • Glucose 10/10/2022 132 (H)  70 - 99 mg/dL Final                  **Please note reference interval change**   • BUN 10/10/2022 24  8 - 27 mg/dL Final   • Creatinine 10/10/2022 1.00  0.57 - 1.00 mg/dL Final   • EGFR Result 10/10/2022 63  >59 mL/min/1.73 Final   • BUN/Creatinine Ratio 10/10/2022 24  12 - 28 Final   • Sodium 10/10/2022 140  134 - 144 mmol/L Final   • Potassium 10/10/2022 4.9  3.5 - 5.2 mmol/L Final   • Chloride 10/10/2022 102  96 - 106 mmol/L Final   • Total CO2 10/10/2022 25  20 - 29 mmol/L Final   • Calcium 10/10/2022 11.7 (H)  8.7 - 10.3 mg/dL Final   • Total Protein 10/10/2022 7.0  6.0 - 8.5 g/dL Final   • Albumin 10/10/2022 4.6  3.8 - 4.8 g/dL Final   • Globulin 10/10/2022 2.4  1.5 - 4.5 g/dL Final   • A/G Ratio 10/10/2022 1.9  1.2  - 2.2 Final   • Total Bilirubin 10/10/2022 0.6  0.0 - 1.2 mg/dL Final   • Alkaline Phosphatase 10/10/2022 145 (H)  44 - 121 IU/L Final   • AST (SGOT) 10/10/2022 18  0 - 40 IU/L Final   • ALT (SGPT) 10/10/2022 24  0 - 32 IU/L Final   • Total Cholesterol 10/10/2022 185  100 - 199 mg/dL Final   • Triglycerides 10/10/2022 177 (H)  0 - 149 mg/dL Final   • HDL Cholesterol 10/10/2022 49  >39 mg/dL Final   • VLDL Cholesterol Prashanth 10/10/2022 31  5 - 40 mg/dL Final   • LDL Chol Calc (NIH) 10/10/2022 105 (H)  0 - 99 mg/dL Final   • 1,25-Dihydroxy, Vitamin D 10/10/2022 69.5  24.8 - 81.5 pg/mL Final                  **Please note reference interval change**   • Hep C Virus Ab 10/10/2022 <0.1  0.0 - 0.9 s/co ratio Final    Comment:                                   Negative:     < 0.8                               Indeterminate: 0.8 - 0.9                                    Positive:     > 0.9   HCV antibody alone does not differentiate between   previous resolved infection and active infection.   The CDC and current clinical guidelines recommend   that a positive HCV antibody result be followed up   with an HCV RNA test to support the diagnosis of   acute HCV infection. [facility] offers Hepatitis C   Virus (HCV) RNA, Diagnosis, KARTHIKEYAN (638397) and   Hepatitis C Virus (HCV) Antibody with reflex to   Quantitative Real-time PCR (301334).     • TSH 10/10/2022 2.580  0.450 - 4.500 uIU/mL Final   • Free T4 10/10/2022 1.51  0.82 - 1.77 ng/dL Final   • RPR 10/10/2022 Non Reactive  Non Reactive Final   • Vitamin B-12 10/10/2022 566  232 - 1,245 pg/mL Final   • Folate 10/10/2022 >20.0  >3.0 ng/mL Final    Comment: A serum folate concentration of less than 3.1 ng/mL is  considered to represent clinical deficiency.     • Hemoglobin A1C 10/10/2022 6.6 (H)  4.8 - 5.6 % Final    Comment:          Prediabetes: 5.7 - 6.4           Diabetes: >6.4           Glycemic control for adults with diabetes: <7.0     • WBC 10/10/2022 6.3  3.4 - 10.8 x10E3/uL  Final   • RBC 10/10/2022 4.73  3.77 - 5.28 x10E6/uL Final   • Hemoglobin 10/10/2022 13.9  11.1 - 15.9 g/dL Final   • Hematocrit 10/10/2022 42.2  34.0 - 46.6 % Final   • MCV 10/10/2022 89  79 - 97 fL Final   • MCH 10/10/2022 29.4  26.6 - 33.0 pg Final   • MCHC 10/10/2022 32.9  31.5 - 35.7 g/dL Final   • RDW 10/10/2022 14.3  11.7 - 15.4 % Final   • Platelets 10/10/2022 221  150 - 450 x10E3/uL Final   • Neutrophil Rel % 10/10/2022 63  Not Estab. % Final   • Lymphocyte Rel % 10/10/2022 26  Not Estab. % Final   • Monocyte Rel % 10/10/2022 7  Not Estab. % Final   • Eosinophil Rel % 10/10/2022 3  Not Estab. % Final   • Basophil Rel % 10/10/2022 1  Not Estab. % Final   • Neutrophils Absolute 10/10/2022 4.0  1.4 - 7.0 x10E3/uL Final   • Lymphocytes Absolute 10/10/2022 1.7  0.7 - 3.1 x10E3/uL Final   • Monocytes Absolute 10/10/2022 0.4  0.1 - 0.9 x10E3/uL Final   • Eosinophils Absolute 10/10/2022 0.2  0.0 - 0.4 x10E3/uL Final   • Basophils Absolute 10/10/2022 0.0  0.0 - 0.2 x10E3/uL Final   • Immature Granulocyte Rel % 10/10/2022 0  Not Estab. % Final   • Immature Grans Absolute 10/10/2022 0.0  0.0 - 0.1 x10E3/uL Final                         Assessment and Plan    Diagnoses and all orders for this visit:    1. Prediabetes (Primary)  Assessment & Plan:  Hgba1c down to normal with lifestyle changes.    Orders:  -     POC Glycosylated Hemoglobin (Hb A1C)    2. Primary hypertension  Assessment & Plan:  Hypertension is improving with treatment.  Continue ramipril 10 mg 2 capsules daily.      3. Mixed hyperlipidemia  Assessment & Plan:  Continue lifestyle changes.        4. Hypercalcemia  Comments:  Follow up with endocrinology as scheduled.    5. Raynaud's phenomenon without gangrene  Comments:  Discussed condition and instruction handout given on after visit summary.    6. Class 2 severe obesity due to excess calories with serious comorbidity and body mass index (BMI) of 39.0 to 39.9 in adult (HCC)  Assessment & Plan:  Patient's  (Body mass index is 39.31 kg/m².) indicates that they are obese (BMI >30) with health conditions that include hypertension . Weight is improving with lifestyle modifications. BMI  is above average; BMI management plan is completed. We discussed portion control and increasing exercise.     She has lost 24 lbs.  Continue lifestyle changes. Congratulated her on her achievement.             Follow Up   Return in about 8 months (around 10/2/2023) for Annual physical.  Patient was given instructions and counseling regarding her condition or for health maintenance advice. Please see specific information pulled into the AVS if appropriate.    There are no Patient Instructions on file for this visit.

## 2023-01-18 ENCOUNTER — OFFICE VISIT (OUTPATIENT)
Dept: FAMILY MEDICINE CLINIC | Facility: CLINIC | Age: 68
End: 2023-01-18
Payer: COMMERCIAL

## 2023-01-18 VITALS
HEIGHT: 64 IN | TEMPERATURE: 97.3 F | OXYGEN SATURATION: 98 % | HEART RATE: 78 BPM | WEIGHT: 229 LBS | DIASTOLIC BLOOD PRESSURE: 77 MMHG | RESPIRATION RATE: 16 BRPM | BODY MASS INDEX: 39.09 KG/M2 | SYSTOLIC BLOOD PRESSURE: 114 MMHG

## 2023-01-18 DIAGNOSIS — E66.01 CLASS 2 SEVERE OBESITY DUE TO EXCESS CALORIES WITH SERIOUS COMORBIDITY AND BODY MASS INDEX (BMI) OF 39.0 TO 39.9 IN ADULT: ICD-10-CM

## 2023-01-18 DIAGNOSIS — I10 PRIMARY HYPERTENSION: ICD-10-CM

## 2023-01-18 DIAGNOSIS — I73.00 RAYNAUD'S PHENOMENON WITHOUT GANGRENE: ICD-10-CM

## 2023-01-18 DIAGNOSIS — E83.52 HYPERCALCEMIA: ICD-10-CM

## 2023-01-18 DIAGNOSIS — E78.2 MIXED HYPERLIPIDEMIA: ICD-10-CM

## 2023-01-18 DIAGNOSIS — R73.03 PREDIABETES: Primary | ICD-10-CM

## 2023-01-18 LAB
EXPIRATION DATE: NORMAL
HBA1C MFR BLD: 5.6 %
Lab: NORMAL

## 2023-01-18 PROCEDURE — 99214 OFFICE O/P EST MOD 30 MIN: CPT | Performed by: NURSE PRACTITIONER

## 2023-01-18 PROCEDURE — 83036 HEMOGLOBIN GLYCOSYLATED A1C: CPT | Performed by: NURSE PRACTITIONER

## 2023-01-19 NOTE — ASSESSMENT & PLAN NOTE
Patient's (Body mass index is 39.31 kg/m².) indicates that they are obese (BMI >30) with health conditions that include hypertension . Weight is improving with lifestyle modifications. BMI  is above average; BMI management plan is completed. We discussed portion control and increasing exercise.     She has lost 24 lbs.  Continue lifestyle changes. Congratulated her on her achievement.

## 2023-02-09 ENCOUNTER — OFFICE VISIT (OUTPATIENT)
Dept: ENDOCRINOLOGY | Age: 68
End: 2023-02-09
Payer: COMMERCIAL

## 2023-02-09 VITALS
OXYGEN SATURATION: 100 % | HEART RATE: 71 BPM | BODY MASS INDEX: 38.99 KG/M2 | SYSTOLIC BLOOD PRESSURE: 122 MMHG | HEIGHT: 64 IN | TEMPERATURE: 96.6 F | WEIGHT: 228.4 LBS | DIASTOLIC BLOOD PRESSURE: 80 MMHG

## 2023-02-09 DIAGNOSIS — E83.52 HYPERCALCEMIA: Primary | ICD-10-CM

## 2023-02-09 DIAGNOSIS — E21.0 PRIMARY HYPERPARATHYROIDISM: ICD-10-CM

## 2023-02-09 PROCEDURE — 99204 OFFICE O/P NEW MOD 45 MIN: CPT | Performed by: INTERNAL MEDICINE

## 2023-02-09 NOTE — PROGRESS NOTES
New Patient      Chief Complaint    Chief Complaint   Patient presents with   • Abnormal Calcium     Pt experiences body aches, has difficulty with her sleep being interrupted, and slight fatigue        HPI:   Brianna Caldera is a 67 y.o. female sent to us for consultative evaluation and management of hypercalcemia.  She thought that this was first found out in October 2022, when she was found on routine lab work to have an elevated calcium.  However, looking through her records, the first recorded elevated calcium was on October 14, 2019, when her calcium was 10.9 mg/dL with an albumin of 4.7 g/dL which would correct to 10.34 mg/dL.  The most recent ionized calcium on October 18, 2022, was 6.4 mg/dL (4.5 - 5.6), with intact PTH of 93 pg/mL.  On October 10, 2022, her calcium was 11.7 mg/dL with an albumin of 4.6 which would give her a corrected calcium of 11.22 mg/dL.  Her creatinine at the time was 1.00 mg/dL with an estimated GFR of 63.  She is not on calcium supplementation and she is not on hydrochlorothiazide.  She is however on furosemide as 20 mg daily.  She has normal thyroid function and on October 10, 2022, her TSH was 2.580 uIU/mL with a free T4 of 1.51 ng/dL.  She does not have any history of kidney stones and she does not have any history of recent bone fractures.  She does however think that she has lost about 1-1/2 inches in height.  She is on vitamin D supplementation as 1000 international units daily and she walks regularly.  The symptoms that she has had over time include having generalized aches and pains, constipation and increased urination at night.  Her bone mineral density checked initially on October 13, 2020 and again on October 5, 2022, showed osteopenia.  The last one on October 5, 2022, showed that her T score at the lumbar spine was -0.4 and compared to the previous examination there had been a 0.5% increase.  At the proximal femur, her T score was -1.9 and it was stated that there had  been an 8.7% decrease in her bone mineral density.  Her 10-year risk of any major osteoporotic fracture was calculated to be 8.9% and her 10-year risk of a hip fracture was calculated as 1.2%.  She does not have any features suggestive of MEN type syndromes.  Family history is positive for kidney stones but no fracture or osteoporosis.        Past Medical History:   Diagnosis Date   • Ankle edema, bilateral    • Cataract     bilateral   • Colon polyp    • Detached vitreous humor     Comments: right   • GERD (gastroesophageal reflux disease)     Impression: Continue OTC medications.   • Glucose intolerance (impaired glucose tolerance)     Impression: With very mild elevation in triglycerides. Improved with diet and weight loss. Continue dietary modifications discussed. No medications needed at this time. Follow-up 6 mo.   • Headache 1998   • Hypertension     Impression: Stable.   • Lesion of lung     was told has spots on lungs, and was biopsied (looked like cancer), but was benign   • Morbid obesity (HCC)     >40   • Osteopenia     per DEXA 2020   • Other hyperlipidemia     Impression: I recommended dietary modifications. Try Mediterranean diet. Follow-up 6 mo with fasting labs prior to visit.   • Overweight     >25   • Prediabetes     diet controlled.  No meds.     • Seasonal allergic rhinitis       ROS:  Pertinent to this visit, only as mentioned above.  The rest was negative.    Social History     Socioeconomic History   • Marital status:      Spouse name: Manan   Tobacco Use   • Smoking status: Never     Passive exposure: Never   • Smokeless tobacco: Never   Vaping Use   • Vaping Use: Never used   Substance and Sexual Activity   • Alcohol use: No   • Drug use: No   • Sexual activity: Yes     Partners: Male     Birth control/protection: Hysterectomy     Physical Exam:  GENERAL: She looked well.  Obese.  HEENT: Normal examination.  NECK: Skin tags.  No palpable thyroid enlargement or discrete thyroid  nodules.  LUNGS: Clear to auscultation bilaterally  CVS: RRR  EXTREMITIES: Normal examination.    Assessment:  1.  Hypercalcemia with elevated intact PTH, consistent with primary hyperparathyroidism, complicated by osteopenia.     Diagnoses and all orders for this visit:    1. Hypercalcemia (Primary)  -     Calcium  -     Albumin  -     PTH, Intact  -     Vitamin D,25-Hydroxy  -     NM Parathyroid Scan w SPECT; Future    2. Primary hyperparathyroidism (HCC)  -     Calcium  -     Albumin  -     PTH, Intact  -     Vitamin D,25-Hydroxy  -     NM Parathyroid Scan w SPECT; Future    Recommendations:  1.  We reviewed with Mrs. Caldera the finding of the elevated calcium along with the elevated intact PTH, consistent with a diagnosis of primary hyperparathyroidism.  2.  We discussed the dangers of that including bone loss and osteoporosis, kidney stones and kidney disease.  3.  We recommend to obtain a repeat calcium today along with albumin, intact PTH and vitamin D levels.  4.  We talked about the various treatment options including conservative follow-up, use of Cinacalcet/Sensipar to decrease the PTH and therefore decrease the elevated calcium levels and its limitations and we talked about possible referral for parathyroidectomy.  5.  In the meantime we ordered for parathyroid sestamibi scan and she was okay to go ahead with that.  6.  She will come back for follow-up in 4 weeks after we have obtained these results and the sestamibi scan and we will again revisit the issue of referral for a parathyroidectomy.  7.  In the meantime we did advise her to make sure that she stays well-hydrated, especially when the temperatures warm up.  8.  We gave her the opportunity to ask questions, which we answered and we addressed her concerns.  We also gave her information to read about hypercalcemia and hyperparathyroidism.

## 2023-02-10 LAB
25(OH)D3+25(OH)D2 SERPL-MCNC: 46 NG/ML (ref 30–100)
ALBUMIN SERPL-MCNC: 4.3 G/DL (ref 3.8–4.8)
CALCIUM SERPL-MCNC: 10.8 MG/DL (ref 8.7–10.3)
PTH-INTACT SERPL-MCNC: 85 PG/ML (ref 15–65)

## 2023-02-27 DIAGNOSIS — E21.0 PRIMARY HYPERPARATHYROIDISM: ICD-10-CM

## 2023-02-27 DIAGNOSIS — E83.52 HYPERCALCEMIA: Primary | ICD-10-CM

## 2023-03-08 ENCOUNTER — OFFICE VISIT (OUTPATIENT)
Dept: SURGERY | Facility: CLINIC | Age: 68
End: 2023-03-08
Payer: COMMERCIAL

## 2023-03-08 VITALS
SYSTOLIC BLOOD PRESSURE: 134 MMHG | HEIGHT: 64 IN | TEMPERATURE: 97.1 F | OXYGEN SATURATION: 100 % | HEART RATE: 71 BPM | BODY MASS INDEX: 38.93 KG/M2 | DIASTOLIC BLOOD PRESSURE: 82 MMHG | WEIGHT: 228 LBS

## 2023-03-08 DIAGNOSIS — E21.3 HYPERPARATHYROIDISM: Primary | ICD-10-CM

## 2023-03-08 DIAGNOSIS — E83.52 HYPERCALCEMIA: ICD-10-CM

## 2023-03-08 DIAGNOSIS — D47.Z2 CASTLEMAN'S DISEASE: ICD-10-CM

## 2023-03-08 PROCEDURE — 99204 OFFICE O/P NEW MOD 45 MIN: CPT | Performed by: SURGERY

## 2023-03-15 ENCOUNTER — HOSPITAL ENCOUNTER (OUTPATIENT)
Dept: ULTRASOUND IMAGING | Facility: HOSPITAL | Age: 68
Discharge: HOME | End: 2023-03-15
Admitting: SURGERY
Payer: COMMERCIAL

## 2023-03-15 DIAGNOSIS — E83.52 HYPERCALCEMIA: ICD-10-CM

## 2023-03-15 DIAGNOSIS — E21.3 HYPERPARATHYROIDISM: ICD-10-CM

## 2023-03-15 DIAGNOSIS — D47.Z2 CASTLEMAN'S DISEASE: ICD-10-CM

## 2023-03-15 PROCEDURE — 76536 US EXAM OF HEAD AND NECK: CPT

## 2023-03-20 ENCOUNTER — OFFICE VISIT (OUTPATIENT)
Dept: SURGERY | Facility: CLINIC | Age: 68
End: 2023-03-20
Payer: COMMERCIAL

## 2023-03-20 VITALS
DIASTOLIC BLOOD PRESSURE: 88 MMHG | OXYGEN SATURATION: 98 % | TEMPERATURE: 96.8 F | BODY MASS INDEX: 38.93 KG/M2 | HEART RATE: 75 BPM | SYSTOLIC BLOOD PRESSURE: 152 MMHG | HEIGHT: 64 IN | WEIGHT: 228 LBS

## 2023-03-20 DIAGNOSIS — E21.0 PRIMARY HYPERPARATHYROIDISM: Primary | ICD-10-CM

## 2023-03-20 PROCEDURE — 99213 OFFICE O/P EST LOW 20 MIN: CPT | Performed by: SURGERY

## 2023-03-20 NOTE — PROGRESS NOTES
CHIEF COMPLAINT:    Chief Complaint   Patient presents with   • FU Thyroid US       HISTORY OF PRESENT ILLNESS:    Brianna Caldera is a 67 y.o. female who underwent ultrasound of her thyroid based on findings from a nuclear medicine SPECT scan due to primary hyperparathyroidism.  She returns today to discuss these results.  Multiple thyroid nodules were seen including a TI-RADS 3 and TI-RADS 4 nodule, none of the nodules met criteria for fine-needle aspiration biopsy.  Given this and the fact that the patient is euthyroid I do not believe that further intervention is warranted currently based on this ultrasound of the thyroid.    This was discussed with the patient today.  She is ready to move forward with parathyroid surgery.    EXAM:  Vitals:    03/20/23 1318   BP: 152/88   Pulse: 75   Temp: 96.8 °F (36 °C)   SpO2: 98%         No palpable neck masses    ASSESSMENT:    Primary hyperparathyroidism    PLAN:    She does previously have had a very in-depth discussion about parathyroid disease.  She would like to pursue parathyroidectomy which I believe is reasonable.  The risks and benefits of parathyroidectomy have been reviewed.  She has been scheduled for surgery.          This document has been electronically signed by Jeovany Castro MD on March 20, 2023 13:36 EDT

## 2023-03-21 ENCOUNTER — HOSPITAL ENCOUNTER (OUTPATIENT)
Dept: CARDIOLOGY | Facility: HOSPITAL | Age: 68
Discharge: HOME | End: 2023-03-21
Payer: COMMERCIAL

## 2023-03-21 ENCOUNTER — LAB (OUTPATIENT)
Dept: LAB | Facility: HOSPITAL | Age: 68
End: 2023-03-21
Payer: COMMERCIAL

## 2023-03-21 LAB
ANION GAP SERPL CALCULATED.3IONS-SCNC: 8 MMOL/L (ref 5–15)
BUN SERPL-MCNC: 24 MG/DL (ref 8–23)
BUN/CREAT SERPL: 23.1 (ref 7–25)
CALCIUM SPEC-SCNC: 11.1 MG/DL (ref 8.6–10.5)
CHLORIDE SERPL-SCNC: 106 MMOL/L (ref 98–107)
CO2 SERPL-SCNC: 28 MMOL/L (ref 22–29)
CREAT SERPL-MCNC: 1.04 MG/DL (ref 0.57–1)
DEPRECATED RDW RBC AUTO: 45.7 FL (ref 37–54)
EGFRCR SERPLBLD CKD-EPI 2021: 59.8 ML/MIN/1.73
ERYTHROCYTE [DISTWIDTH] IN BLOOD BY AUTOMATED COUNT: 14.4 % (ref 12.3–15.4)
GLUCOSE SERPL-MCNC: 121 MG/DL (ref 65–99)
HCT VFR BLD AUTO: 41.3 % (ref 34–46.6)
HGB BLD-MCNC: 13.3 G/DL (ref 12–15.9)
MCH RBC QN AUTO: 28.6 PG (ref 26.6–33)
MCHC RBC AUTO-ENTMCNC: 32.2 G/DL (ref 31.5–35.7)
MCV RBC AUTO: 88.8 FL (ref 79–97)
PLATELET # BLD AUTO: 203 10*3/MM3 (ref 140–450)
PMV BLD AUTO: 9.6 FL (ref 6–12)
POTASSIUM SERPL-SCNC: 4.3 MMOL/L (ref 3.5–5.2)
RBC # BLD AUTO: 4.65 10*6/MM3 (ref 3.77–5.28)
SODIUM SERPL-SCNC: 142 MMOL/L (ref 136–145)
WBC NRBC COR # BLD: 6.36 10*3/MM3 (ref 3.4–10.8)

## 2023-03-21 PROCEDURE — 93010 ELECTROCARDIOGRAM REPORT: CPT | Performed by: INTERNAL MEDICINE

## 2023-03-21 PROCEDURE — 93005 ELECTROCARDIOGRAM TRACING: CPT | Performed by: SURGERY

## 2023-03-21 PROCEDURE — 80048 BASIC METABOLIC PNL TOTAL CA: CPT

## 2023-03-21 PROCEDURE — 85027 COMPLETE CBC AUTOMATED: CPT

## 2023-03-22 LAB — QT INTERVAL: 408 MS

## 2023-03-23 NOTE — PROGRESS NOTES
CHIEF COMPLAINT:    Primary hyperparathyroidism    HISTORY OF PRESENT ILLNESS:    Brianna Caldera is a 67 y.o. female who is referred today for primary hyperparathyroidism.  She has a fairly longstanding history of elevated calcium seen on labs, dating back at least 2 years.  Her highest calcium recorded has been 11.7.  She also had concurrent assessment of her PTH which was inappropriately elevated most recent result was a calcium of 10.8 with a PTH of 85.  Given these findings the patient was sent for a nuclear medicine SPECT scan.  This did show a nodule in the right lower lobe region of the thyroid consistent with potential parathyroid adenoma.  There was also a questionable nodule in the right thyroid lobe itself.  She was also found to have multiple pulmonary nodules.  She states that she is aware of these pulmonary nodules and has been worked up for them in the past.    Her main symptom of hypercalcemia appears to be long bone pain.  She has no history of kidney stones, no history of fractures.    Past Medical History:   Diagnosis Date   • Anesthesia complication     face itches   • Ankle edema, bilateral    • Cataract     bilateral   • Cholelithiasis 2001   • Colon polyp    • Constipation    • Diabetes mellitus (HCC) 10/15/2022    A1C now normal   • GERD (gastroesophageal reflux disease)     Impression: Continue OTC medications.   • Glucose intolerance (impaired glucose tolerance)     Impression: With very mild elevation in triglycerides. Improved with diet and weight loss. Continue dietary modifications discussed. No medications needed at this time. Follow-up 6 mo.   • Headache 1998   • Lesion of lung     was told has spots on lungs, and was biopsied (looked like cancer), but was benign   • Morbid obesity (HCC)     >40   • Osteopenia     per DEXA 2020   • Other hyperlipidemia     Impression: I recommended dietary modifications. Try Mediterranean diet. Follow-up 6 mo with fasting labs prior to visit.   •  Overweight     >25   • Peripheral edema     occasonally   • Prediabetes     diet controlled.  No meds.     • Seasonal allergic rhinitis        Past Surgical History:   Procedure Laterality Date   • ADENOIDECTOMY     • CATARACT EXTRACTION, BILATERAL     •  SECTION      two   • COLONOSCOPY      polyps removed   • COLONOSCOPY  10/27/2021    diverticulosis; 1 polyp; repeat in 7 years ()   • ESOPHAGOSCOPY / EGD  10/27/2021    hiatal hernia, dilation   • EXPLORATORY LAPAROTOMY      for small bowel obstruction/intussusception   • EYE SURGERY  3/2021    Cataract   • FRACTURE SURGERY  ,    • INCISIONAL HERNIA REPAIR Bilateral    • LAPAROSCOPIC CHOLECYSTECTOMY     • LUNG BIOPSY     • TONSILLECTOMY     • TONSILLECTOMY     • TOTAL ABDOMINAL HYSTERECTOMY WITH SALPINGO OOPHORECTOMY     • VENTRAL/INCISIONAL HERNIA REPAIR N/A 2019    Procedure: OPEN INCISIONAL HERNIA REPAIR BILATERAL COMPONENT RELEASE WITH MESH.;  Surgeon: Tomas Alamo DO;  Location: [facility] MAIN OR;  Service: General   • WRIST FRACTURE SURGERY      s/p pin placement in  and repair (bone shortening) in  - Branden       Prior to Admission medications    Medication Sig Start Date End Date Taking? Authorizing Provider   ascorbic acid (VITAMIN C) 1000 MG tablet Take 1 tablet by mouth Daily.   Yes Provider, MD Rios   furosemide (LASIX) 20 MG tablet Take 1 tablet by mouth Daily As Needed (swelling).  Patient taking differently: Take 1 tablet by mouth Daily As Needed (swelling). None am of surgery 1/3/23  Yes Danica Will APRN   montelukast (SINGULAIR) 10 MG tablet 1 tablet by mouth at bedtime  Patient taking differently: Take 1 tablet by mouth Every Night. Only seasonally none currently 10/9/22  Yes Danica Will APRN   Multiple Vitamins-Minerals (MULTIVITAMIN ADULTS PO) Take 1 tablet by mouth Daily.   Yes Provider, MD Rios   nystatin (MYCOSTATIN) 509940 UNIT/GM cream  "Apply  topically to the appropriate area as directed 2 (Two) Times a Day As Needed (rash).  Patient taking differently: Apply  topically to the appropriate area as directed 2 (Two) Times a Day As Needed (rash). Not currently using 9/17/21  Yes Maegan Mohr MD   omeprazole (prilOSEC) 10 MG capsule Take 1 capsule by mouth Every Evening. 8/27/18  Yes Rios Zuñiga MD   potassium chloride (K-DUR,KLOR-CON) 10 MEQ CR tablet Take 1 tablet by mouth Daily As Needed. With water pill 8/28/18  Yes Rios Zuñiga MD   ramipril (ALTACE) 10 MG capsule Take 2 capsules by mouth Daily.  Patient taking differently: Take 2 capsules by mouth Every Evening. Last dose 3/22 10/5/22  Yes Danica Will APRN       No Known Allergies    Family History   Problem Relation Age of Onset   • Colon cancer Mother    • Cancer Mother         Colon Cancer   • Kidney cancer Father    • Cancer Father         Kidney Cancer   • Brain cancer Brother    • Cancer Brother         Brain tumors   • Breast cancer Maternal Grandmother    • Diabetes Maternal Grandmother    • Cancer Maternal Grandmother         Breast Cancer   • Cancer Maternal Grandfather         Bladder cancer   • Ovarian cancer Neg Hx        Social History     Socioeconomic History   • Marital status:      Spouse name: Manan   Tobacco Use   • Smoking status: Never     Passive exposure: Never   • Smokeless tobacco: Never   Vaping Use   • Vaping Use: Never used   Substance and Sexual Activity   • Alcohol use: No   • Drug use: No   • Sexual activity: Defer       Review of Systems   Musculoskeletal:        Long bone pain       Objective     /82 (BP Location: Right arm, Patient Position: Sitting, Cuff Size: Large Adult)   Pulse 71   Temp 97.1 °F (36.2 °C) (Infrared)   Ht 162.6 cm (64\")   Wt 103 kg (228 lb)   SpO2 100%   BMI 39.14 kg/m²     Physical Exam  Constitutional:       General: She is not in acute distress.     Appearance: Normal appearance. " She is obese. She is not ill-appearing, toxic-appearing or diaphoretic.   HENT:      Head: Normocephalic and atraumatic.   Eyes:      General:         Right eye: No discharge.         Left eye: No discharge.      Extraocular Movements: Extraocular movements intact.      Conjunctiva/sclera: Conjunctivae normal.   Neck:      Thyroid: No thyroid mass, thyromegaly or thyroid tenderness.   Pulmonary:      Effort: Pulmonary effort is normal. No respiratory distress.   Skin:     General: Skin is warm.      Coloration: Skin is not jaundiced.   Neurological:      General: No focal deficit present.      Mental Status: She is alert and oriented to person, place, and time.   Psychiatric:         Mood and Affect: Mood normal.         Behavior: Behavior normal.         Thought Content: Thought content normal.         Judgment: Judgment normal.         DIAGNOSTIC DATA:    Calcium 10.8, PTH 85 on 2/9/2023.  SPECT scan results performed at [facility] dated 2/21/2023 were reviewed and were discussed with the patient today.  Ultrasound of the thyroid was ordered today.    ASSESSMENT:    Lung nodules, previously worked up and found to be benign, suspected thyroid nodules requiring further work-up, primary hyperparathyroidism due to apparent parathyroid adenoma.    PLAN:    We had a long discussion today regarding her symptoms, her hypercalcemia, her hyperparathyroidism.  Given her SPECT scan results I did recommend that she undergo a thyroid ultrasound for more complete assessment in case any thyroid related interventions are needed.  Ultimately, I have recommended that she undergo parathyroidectomy for alleviation of her primary hyperparathyroidism.  But will defer any further surgical planning until after her thyroid ultrasound has been performed.          This document has been electronically signed by Jeovany Castro MD on March 23, 2023 09:54 EDT

## 2023-03-23 NOTE — H&P (VIEW-ONLY)
CHIEF COMPLAINT:    Primary hyperparathyroidism    HISTORY OF PRESENT ILLNESS:    Brianna Caldera is a 67 y.o. female who is referred today for primary hyperparathyroidism.  She has a fairly longstanding history of elevated calcium seen on labs, dating back at least 2 years.  Her highest calcium recorded has been 11.7.  She also had concurrent assessment of her PTH which was inappropriately elevated most recent result was a calcium of 10.8 with a PTH of 85.  Given these findings the patient was sent for a nuclear medicine SPECT scan.  This did show a nodule in the right lower lobe region of the thyroid consistent with potential parathyroid adenoma.  There was also a questionable nodule in the right thyroid lobe itself.  She was also found to have multiple pulmonary nodules.  She states that she is aware of these pulmonary nodules and has been worked up for them in the past.    Her main symptom of hypercalcemia appears to be long bone pain.  She has no history of kidney stones, no history of fractures.    Past Medical History:   Diagnosis Date   • Anesthesia complication     face itches   • Ankle edema, bilateral    • Cataract     bilateral   • Cholelithiasis 2001   • Colon polyp    • Constipation    • Diabetes mellitus (HCC) 10/15/2022    A1C now normal   • GERD (gastroesophageal reflux disease)     Impression: Continue OTC medications.   • Glucose intolerance (impaired glucose tolerance)     Impression: With very mild elevation in triglycerides. Improved with diet and weight loss. Continue dietary modifications discussed. No medications needed at this time. Follow-up 6 mo.   • Headache 1998   • Lesion of lung     was told has spots on lungs, and was biopsied (looked like cancer), but was benign   • Morbid obesity (HCC)     >40   • Osteopenia     per DEXA 2020   • Other hyperlipidemia     Impression: I recommended dietary modifications. Try Mediterranean diet. Follow-up 6 mo with fasting labs prior to visit.   •  Overweight     >25   • Peripheral edema     occasonally   • Prediabetes     diet controlled.  No meds.     • Seasonal allergic rhinitis        Past Surgical History:   Procedure Laterality Date   • ADENOIDECTOMY     • CATARACT EXTRACTION, BILATERAL     •  SECTION      two   • COLONOSCOPY      polyps removed   • COLONOSCOPY  10/27/2021    diverticulosis; 1 polyp; repeat in 7 years ()   • ESOPHAGOSCOPY / EGD  10/27/2021    hiatal hernia, dilation   • EXPLORATORY LAPAROTOMY      for small bowel obstruction/intussusception   • EYE SURGERY  3/2021    Cataract   • FRACTURE SURGERY  ,    • INCISIONAL HERNIA REPAIR Bilateral    • LAPAROSCOPIC CHOLECYSTECTOMY     • LUNG BIOPSY     • TONSILLECTOMY     • TONSILLECTOMY     • TOTAL ABDOMINAL HYSTERECTOMY WITH SALPINGO OOPHORECTOMY     • VENTRAL/INCISIONAL HERNIA REPAIR N/A 2019    Procedure: OPEN INCISIONAL HERNIA REPAIR BILATERAL COMPONENT RELEASE WITH MESH.;  Surgeon: Tomas Alamo DO;  Location: [facility] MAIN OR;  Service: General   • WRIST FRACTURE SURGERY      s/p pin placement in  and repair (bone shortening) in  - Branden       Prior to Admission medications    Medication Sig Start Date End Date Taking? Authorizing Provider   ascorbic acid (VITAMIN C) 1000 MG tablet Take 1 tablet by mouth Daily.   Yes Provider, MD Rios   furosemide (LASIX) 20 MG tablet Take 1 tablet by mouth Daily As Needed (swelling).  Patient taking differently: Take 1 tablet by mouth Daily As Needed (swelling). None am of surgery 1/3/23  Yes Danica Will APRN   montelukast (SINGULAIR) 10 MG tablet 1 tablet by mouth at bedtime  Patient taking differently: Take 1 tablet by mouth Every Night. Only seasonally none currently 10/9/22  Yes Danica Will APRN   Multiple Vitamins-Minerals (MULTIVITAMIN ADULTS PO) Take 1 tablet by mouth Daily.   Yes Provider, MD Rios   nystatin (MYCOSTATIN) [id number] UNIT/GM cream  "Apply  topically to the appropriate area as directed 2 (Two) Times a Day As Needed (rash).  Patient taking differently: Apply  topically to the appropriate area as directed 2 (Two) Times a Day As Needed (rash). Not currently using 9/17/21  Yes Maegan Mohr MD   omeprazole (prilOSEC) 10 MG capsule Take 1 capsule by mouth Every Evening. 8/27/18  Yes Rios Zuñiga MD   potassium chloride (K-DUR,KLOR-CON) 10 MEQ CR tablet Take 1 tablet by mouth Daily As Needed. With water pill 8/28/18  Yes Rios Zuñiga MD   ramipril (ALTACE) 10 MG capsule Take 2 capsules by mouth Daily.  Patient taking differently: Take 2 capsules by mouth Every Evening. Last dose 3/22 10/5/22  Yes Danica Will APRN       No Known Allergies    Family History   Problem Relation Age of Onset   • Colon cancer Mother    • Cancer Mother         Colon Cancer   • Kidney cancer Father    • Cancer Father         Kidney Cancer   • Brain cancer Brother    • Cancer Brother         Brain tumors   • Breast cancer Maternal Grandmother    • Diabetes Maternal Grandmother    • Cancer Maternal Grandmother         Breast Cancer   • Cancer Maternal Grandfather         Bladder cancer   • Ovarian cancer Neg Hx        Social History     Socioeconomic History   • Marital status:      Spouse name: Manan   Tobacco Use   • Smoking status: Never     Passive exposure: Never   • Smokeless tobacco: Never   Vaping Use   • Vaping Use: Never used   Substance and Sexual Activity   • Alcohol use: No   • Drug use: No   • Sexual activity: Defer       Review of Systems   Musculoskeletal:        Long bone pain       Objective     /82 (BP Location: Right arm, Patient Position: Sitting, Cuff Size: Large Adult)   Pulse 71   Temp 97.1 °F (36.2 °C) (Infrared)   Ht 162.6 cm (64\")   Wt 103 kg (228 lb)   SpO2 100%   BMI 39.14 kg/m²     Physical Exam  Constitutional:       General: She is not in acute distress.     Appearance: Normal appearance. " She is obese. She is not ill-appearing, toxic-appearing or diaphoretic.   HENT:      Head: Normocephalic and atraumatic.   Eyes:      General:         Right eye: No discharge.         Left eye: No discharge.      Extraocular Movements: Extraocular movements intact.      Conjunctiva/sclera: Conjunctivae normal.   Neck:      Thyroid: No thyroid mass, thyromegaly or thyroid tenderness.   Pulmonary:      Effort: Pulmonary effort is normal. No respiratory distress.   Skin:     General: Skin is warm.      Coloration: Skin is not jaundiced.   Neurological:      General: No focal deficit present.      Mental Status: She is alert and oriented to person, place, and time.   Psychiatric:         Mood and Affect: Mood normal.         Behavior: Behavior normal.         Thought Content: Thought content normal.         Judgment: Judgment normal.         DIAGNOSTIC DATA:    Calcium 10.8, PTH 85 on 2/9/2023.  SPECT scan results performed at [facility] dated 2/21/2023 were reviewed and were discussed with the patient today.  Ultrasound of the thyroid was ordered today.    ASSESSMENT:    Lung nodules, previously worked up and found to be benign, suspected thyroid nodules requiring further work-up, primary hyperparathyroidism due to apparent parathyroid adenoma.    PLAN:    We had a long discussion today regarding her symptoms, her hypercalcemia, her hyperparathyroidism.  Given her SPECT scan results I did recommend that she undergo a thyroid ultrasound for more complete assessment in case any thyroid related interventions are needed.  Ultimately, I have recommended that she undergo parathyroidectomy for alleviation of her primary hyperparathyroidism.  But will defer any further surgical planning until after her thyroid ultrasound has been performed.          This document has been electronically signed by Jeovany Castro MD on March 23, 2023 09:54 EDT

## 2023-03-24 ENCOUNTER — ANESTHESIA EVENT (OUTPATIENT)
Dept: PERIOP | Facility: HOSPITAL | Age: 68
End: 2023-03-24
Payer: COMMERCIAL

## 2023-03-24 ENCOUNTER — HOSPITAL ENCOUNTER (OUTPATIENT)
Facility: HOSPITAL | Age: 68
Setting detail: HOSPITAL OUTPATIENT SURGERY
Discharge: HOME | End: 2023-03-24
Attending: SURGERY | Admitting: SURGERY
Payer: COMMERCIAL

## 2023-03-24 ENCOUNTER — ANESTHESIA (OUTPATIENT)
Dept: PERIOP | Facility: HOSPITAL | Age: 68
End: 2023-03-24
Payer: COMMERCIAL

## 2023-03-24 VITALS
DIASTOLIC BLOOD PRESSURE: 61 MMHG | HEIGHT: 64 IN | WEIGHT: 231.4 LBS | HEART RATE: 87 BPM | TEMPERATURE: 97.7 F | BODY MASS INDEX: 39.5 KG/M2 | OXYGEN SATURATION: 98 % | RESPIRATION RATE: 16 BRPM | SYSTOLIC BLOOD PRESSURE: 111 MMHG

## 2023-03-24 DIAGNOSIS — E21.0 PRIMARY HYPERPARATHYROIDISM: ICD-10-CM

## 2023-03-24 LAB
CALCIUM SPEC-SCNC: 11 MG/DL (ref 8.6–10.5)
PTH-INTACT SERPL-MCNC: 87.1 PG/ML (ref 15–65)
PTH-INTACT SERPL-SCNC: 47.9 PG/ML (ref 15–65)

## 2023-03-24 PROCEDURE — 88331 PATH CONSLTJ SURG 1 BLK 1SPC: CPT | Performed by: PATHOLOGY

## 2023-03-24 PROCEDURE — 25010000002 HYDROMORPHONE 1 MG/ML SOLUTION: Performed by: NURSE ANESTHETIST, CERTIFIED REGISTERED

## 2023-03-24 PROCEDURE — 82310 ASSAY OF CALCIUM: CPT | Performed by: SURGERY

## 2023-03-24 PROCEDURE — 25010000002 DEXAMETHASONE PER 1 MG: Performed by: NURSE ANESTHETIST, CERTIFIED REGISTERED

## 2023-03-24 PROCEDURE — 25010000002 FENTANYL CITRATE (PF) 100 MCG/2ML SOLUTION: Performed by: NURSE ANESTHETIST, CERTIFIED REGISTERED

## 2023-03-24 PROCEDURE — 83970 ASSAY OF PARATHORMONE: CPT | Performed by: SURGERY

## 2023-03-24 PROCEDURE — 60500 EXPLORE PARATHYROID GLANDS: CPT

## 2023-03-24 PROCEDURE — 25010000002 ONDANSETRON PER 1 MG: Performed by: NURSE ANESTHETIST, CERTIFIED REGISTERED

## 2023-03-24 PROCEDURE — 25010000002 MAGNESIUM SULFATE PER 500 MG OF MAGNESIUM: Performed by: NURSE ANESTHETIST, CERTIFIED REGISTERED

## 2023-03-24 PROCEDURE — 25010000002 PROPOFOL 200 MG/20ML EMULSION: Performed by: NURSE ANESTHETIST, CERTIFIED REGISTERED

## 2023-03-24 PROCEDURE — 88305 TISSUE EXAM BY PATHOLOGIST: CPT | Performed by: SURGERY

## 2023-03-24 PROCEDURE — 25010000002 CEFAZOLIN PER 500 MG: Performed by: SURGERY

## 2023-03-24 PROCEDURE — 60500 EXPLORE PARATHYROID GLANDS: CPT | Performed by: SURGERY

## 2023-03-24 DEVICE — FLOSEAL WITH RECOTHROM - 5ML
Type: IMPLANTABLE DEVICE | Site: NECK | Status: FUNCTIONAL
Brand: FLOSEAL HEMOSTATIC MATRIX

## 2023-03-24 DEVICE — LIGACLIP MCA MULTIPLE CLIP APPLIERS, 20 SMALL CLIPS
Type: IMPLANTABLE DEVICE | Site: NECK | Status: FUNCTIONAL
Brand: LIGACLIP

## 2023-03-24 RX ORDER — HYDROCODONE BITARTRATE AND ACETAMINOPHEN 7.5; 325 MG/1; MG/1
2 TABLET ORAL EVERY 4 HOURS PRN
Status: DISCONTINUED | OUTPATIENT
Start: 2023-03-24 | End: 2023-03-24 | Stop reason: HOSPADM

## 2023-03-24 RX ORDER — LABETALOL HYDROCHLORIDE 5 MG/ML
5 INJECTION, SOLUTION INTRAVENOUS
Status: DISCONTINUED | OUTPATIENT
Start: 2023-03-24 | End: 2023-03-24 | Stop reason: HOSPADM

## 2023-03-24 RX ORDER — GLYCOPYRROLATE 0.2 MG/ML
INJECTION INTRAMUSCULAR; INTRAVENOUS AS NEEDED
Status: DISCONTINUED | OUTPATIENT
Start: 2023-03-24 | End: 2023-03-24 | Stop reason: SURG

## 2023-03-24 RX ORDER — ROCURONIUM BROMIDE 10 MG/ML
INJECTION, SOLUTION INTRAVENOUS AS NEEDED
Status: DISCONTINUED | OUTPATIENT
Start: 2023-03-24 | End: 2023-03-24 | Stop reason: SURG

## 2023-03-24 RX ORDER — LIDOCAINE HYDROCHLORIDE 10 MG/ML
0.5 INJECTION, SOLUTION INFILTRATION; PERINEURAL ONCE AS NEEDED
Status: DISCONTINUED | OUTPATIENT
Start: 2023-03-24 | End: 2023-03-24 | Stop reason: HOSPADM

## 2023-03-24 RX ORDER — GLYCOPYRROLATE 0.2 MG/ML
INJECTION INTRAMUSCULAR; INTRAVENOUS AS NEEDED
Status: DISCONTINUED | OUTPATIENT
Start: 2023-03-24 | End: 2023-03-24

## 2023-03-24 RX ORDER — MAGNESIUM SULFATE HEPTAHYDRATE 500 MG/ML
INJECTION, SOLUTION INTRAMUSCULAR; INTRAVENOUS AS NEEDED
Status: DISCONTINUED | OUTPATIENT
Start: 2023-03-24 | End: 2023-03-24 | Stop reason: SURG

## 2023-03-24 RX ORDER — NEOSTIGMINE METHYLSULFATE 5 MG/5 ML
SYRINGE (ML) INTRAVENOUS AS NEEDED
Status: DISCONTINUED | OUTPATIENT
Start: 2023-03-24 | End: 2023-03-24 | Stop reason: SURG

## 2023-03-24 RX ORDER — PROCHLORPERAZINE EDISYLATE 5 MG/ML
10 INJECTION INTRAMUSCULAR; INTRAVENOUS ONCE AS NEEDED
Status: DISCONTINUED | OUTPATIENT
Start: 2023-03-24 | End: 2023-03-24 | Stop reason: HOSPADM

## 2023-03-24 RX ORDER — FENTANYL CITRATE 50 UG/ML
50 INJECTION, SOLUTION INTRAMUSCULAR; INTRAVENOUS
Status: DISCONTINUED | OUTPATIENT
Start: 2023-03-24 | End: 2023-03-24 | Stop reason: HOSPADM

## 2023-03-24 RX ORDER — DEXAMETHASONE SODIUM PHOSPHATE 4 MG/ML
INJECTION, SOLUTION INTRA-ARTICULAR; INTRALESIONAL; INTRAMUSCULAR; INTRAVENOUS; SOFT TISSUE AS NEEDED
Status: DISCONTINUED | OUTPATIENT
Start: 2023-03-24 | End: 2023-03-24 | Stop reason: SURG

## 2023-03-24 RX ORDER — NALOXONE HCL 0.4 MG/ML
0.4 VIAL (ML) INJECTION AS NEEDED
Status: DISCONTINUED | OUTPATIENT
Start: 2023-03-24 | End: 2023-03-24 | Stop reason: HOSPADM

## 2023-03-24 RX ORDER — ONDANSETRON 2 MG/ML
4 INJECTION INTRAMUSCULAR; INTRAVENOUS ONCE AS NEEDED
Status: DISCONTINUED | OUTPATIENT
Start: 2023-03-24 | End: 2023-03-24 | Stop reason: HOSPADM

## 2023-03-24 RX ORDER — ACETAMINOPHEN 325 MG/1
650 TABLET ORAL ONCE AS NEEDED
Status: DISCONTINUED | OUTPATIENT
Start: 2023-03-24 | End: 2023-03-24 | Stop reason: HOSPADM

## 2023-03-24 RX ORDER — ACETAMINOPHEN 650 MG/1
650 SUPPOSITORY RECTAL ONCE AS NEEDED
Status: DISCONTINUED | OUTPATIENT
Start: 2023-03-24 | End: 2023-03-24 | Stop reason: HOSPADM

## 2023-03-24 RX ORDER — SODIUM CHLORIDE, SODIUM LACTATE, POTASSIUM CHLORIDE, CALCIUM CHLORIDE 600; 310; 30; 20 MG/100ML; MG/100ML; MG/100ML; MG/100ML
1000 INJECTION, SOLUTION INTRAVENOUS CONTINUOUS
Status: DISCONTINUED | OUTPATIENT
Start: 2023-03-24 | End: 2023-03-24 | Stop reason: HOSPADM

## 2023-03-24 RX ORDER — EPHEDRINE SULFATE 5 MG/ML
INJECTION INTRAVENOUS AS NEEDED
Status: DISCONTINUED | OUTPATIENT
Start: 2023-03-24 | End: 2023-03-24 | Stop reason: SURG

## 2023-03-24 RX ORDER — FENTANYL CITRATE 50 UG/ML
25 INJECTION, SOLUTION INTRAMUSCULAR; INTRAVENOUS
Status: DISCONTINUED | OUTPATIENT
Start: 2023-03-24 | End: 2023-03-24 | Stop reason: HOSPADM

## 2023-03-24 RX ORDER — LIDOCAINE HYDROCHLORIDE AND EPINEPHRINE 10; 10 MG/ML; UG/ML
INJECTION, SOLUTION INFILTRATION; PERINEURAL AS NEEDED
Status: DISCONTINUED | OUTPATIENT
Start: 2023-03-24 | End: 2023-03-24 | Stop reason: HOSPADM

## 2023-03-24 RX ORDER — HYDROCODONE BITARTRATE AND ACETAMINOPHEN 5; 325 MG/1; MG/1
1 TABLET ORAL EVERY 6 HOURS PRN
Qty: 20 TABLET | Refills: 0 | Status: SHIPPED | OUTPATIENT
Start: 2023-03-24

## 2023-03-24 RX ORDER — PROPOFOL 10 MG/ML
INJECTION, EMULSION INTRAVENOUS AS NEEDED
Status: DISCONTINUED | OUTPATIENT
Start: 2023-03-24 | End: 2023-03-24 | Stop reason: SURG

## 2023-03-24 RX ORDER — ONDANSETRON 2 MG/ML
INJECTION INTRAMUSCULAR; INTRAVENOUS AS NEEDED
Status: DISCONTINUED | OUTPATIENT
Start: 2023-03-24 | End: 2023-03-24 | Stop reason: SURG

## 2023-03-24 RX ORDER — FLUMAZENIL 0.1 MG/ML
0.1 INJECTION INTRAVENOUS AS NEEDED
Status: DISCONTINUED | OUTPATIENT
Start: 2023-03-24 | End: 2023-03-24 | Stop reason: HOSPADM

## 2023-03-24 RX ORDER — PHENYLEPHRINE HCL IN 0.9% NACL 1 MG/10 ML
SYRINGE (ML) INTRAVENOUS AS NEEDED
Status: DISCONTINUED | OUTPATIENT
Start: 2023-03-24 | End: 2023-03-24 | Stop reason: SURG

## 2023-03-24 RX ORDER — SODIUM CHLORIDE 0.9 % (FLUSH) 0.9 %
10 SYRINGE (ML) INJECTION AS NEEDED
Status: DISCONTINUED | OUTPATIENT
Start: 2023-03-24 | End: 2023-03-24 | Stop reason: HOSPADM

## 2023-03-24 RX ORDER — HYDRALAZINE HYDROCHLORIDE 20 MG/ML
5 INJECTION INTRAMUSCULAR; INTRAVENOUS
Status: DISCONTINUED | OUTPATIENT
Start: 2023-03-24 | End: 2023-03-24 | Stop reason: HOSPADM

## 2023-03-24 RX ORDER — FENTANYL CITRATE 50 UG/ML
INJECTION, SOLUTION INTRAMUSCULAR; INTRAVENOUS AS NEEDED
Status: DISCONTINUED | OUTPATIENT
Start: 2023-03-24 | End: 2023-03-24 | Stop reason: SURG

## 2023-03-24 RX ORDER — ALBUTEROL SULFATE 2.5 MG/3ML
2.5 SOLUTION RESPIRATORY (INHALATION) ONCE AS NEEDED
Status: DISCONTINUED | OUTPATIENT
Start: 2023-03-24 | End: 2023-03-24 | Stop reason: HOSPADM

## 2023-03-24 RX ORDER — LIDOCAINE HYDROCHLORIDE 20 MG/ML
INJECTION, SOLUTION EPIDURAL; INFILTRATION; INTRACAUDAL; PERINEURAL AS NEEDED
Status: DISCONTINUED | OUTPATIENT
Start: 2023-03-24 | End: 2023-03-24 | Stop reason: SURG

## 2023-03-24 RX ORDER — DIPHENHYDRAMINE HYDROCHLORIDE 50 MG/ML
12.5 INJECTION INTRAMUSCULAR; INTRAVENOUS
Status: DISCONTINUED | OUTPATIENT
Start: 2023-03-24 | End: 2023-03-24 | Stop reason: HOSPADM

## 2023-03-24 RX ADMIN — FENTANYL CITRATE 50 MCG: 50 INJECTION, SOLUTION INTRAMUSCULAR; INTRAVENOUS at 11:00

## 2023-03-24 RX ADMIN — MAGNESIUM SULFATE HEPTAHYDRATE 1 G: 500 INJECTION, SOLUTION INTRAMUSCULAR; INTRAVENOUS at 10:36

## 2023-03-24 RX ADMIN — Medication 3 MG: at 11:31

## 2023-03-24 RX ADMIN — PROPOFOL 150 MG: 10 INJECTION, EMULSION INTRAVENOUS at 09:51

## 2023-03-24 RX ADMIN — GLYCOPYRROLATE 0.6 MG: 0.2 INJECTION INTRAMUSCULAR; INTRAVENOUS at 11:31

## 2023-03-24 RX ADMIN — FENTANYL CITRATE 50 MCG: 50 INJECTION, SOLUTION INTRAMUSCULAR; INTRAVENOUS at 10:36

## 2023-03-24 RX ADMIN — LIDOCAINE HYDROCHLORIDE 60 MG: 20 INJECTION, SOLUTION EPIDURAL; INFILTRATION; INTRACAUDAL; PERINEURAL at 09:51

## 2023-03-24 RX ADMIN — Medication 200 MCG: at 10:20

## 2023-03-24 RX ADMIN — EPHEDRINE SULFATE 5 MG: 5 INJECTION INTRAVENOUS at 10:20

## 2023-03-24 RX ADMIN — EPHEDRINE SULFATE 10 MG: 5 INJECTION INTRAVENOUS at 10:26

## 2023-03-24 RX ADMIN — HYDROCODONE BITARTRATE AND ACETAMINOPHEN 1 TABLET: 7.5; 325 TABLET ORAL at 12:22

## 2023-03-24 RX ADMIN — ONDANSETRON 4 MG: 2 INJECTION INTRAMUSCULAR; INTRAVENOUS at 11:22

## 2023-03-24 RX ADMIN — LIDOCAINE HYDROCHLORIDE 40 MG: 20 INJECTION, SOLUTION EPIDURAL; INFILTRATION; INTRACAUDAL; PERINEURAL at 11:22

## 2023-03-24 RX ADMIN — Medication 100 MCG: at 09:58

## 2023-03-24 RX ADMIN — ROCURONIUM BROMIDE 10 MG: 10 INJECTION, SOLUTION INTRAVENOUS at 10:32

## 2023-03-24 RX ADMIN — ROCURONIUM BROMIDE 40 MG: 10 INJECTION, SOLUTION INTRAVENOUS at 09:51

## 2023-03-24 RX ADMIN — CEFAZOLIN 2 G: 2 INJECTION, POWDER, FOR SOLUTION INTRAMUSCULAR; INTRAVENOUS at 09:53

## 2023-03-24 RX ADMIN — HYDROMORPHONE HYDROCHLORIDE 0.5 MG: 1 INJECTION, SOLUTION INTRAMUSCULAR; INTRAVENOUS; SUBCUTANEOUS at 12:52

## 2023-03-24 RX ADMIN — HYDROMORPHONE HYDROCHLORIDE 0.5 MG: 1 INJECTION, SOLUTION INTRAMUSCULAR; INTRAVENOUS; SUBCUTANEOUS at 12:18

## 2023-03-24 RX ADMIN — SODIUM CHLORIDE, SODIUM LACTATE, POTASSIUM CHLORIDE, AND CALCIUM CHLORIDE 1000 ML: .6; .31; .03; .02 INJECTION, SOLUTION INTRAVENOUS at 08:18

## 2023-03-24 RX ADMIN — FENTANYL CITRATE 100 MCG: 50 INJECTION, SOLUTION INTRAMUSCULAR; INTRAVENOUS at 09:48

## 2023-03-24 RX ADMIN — DEXAMETHASONE SODIUM PHOSPHATE 4 MG: 4 INJECTION, SOLUTION INTRAMUSCULAR; INTRAVENOUS at 09:58

## 2023-03-24 NOTE — OP NOTE
Operative Note    Brianna Caldera  3/24/2023    Pre-op Diagnosis:   Primary hyperparathyroidism (HCC) [E21.0]    Post-op Diagnosis:     Post-Op Diagnosis Codes:     * Primary hyperparathyroidism (HCC) [E21.0]    Procedure/CPT® Codes:      Procedure(s):  PARATHYROIDECTOMY    Surgeon(s):  Jeovany Castro MD    Anesthesia: General    Staff:   Circulator: Cami Kemp RN  Scrub Person: Rochelle Huff; Marley Killian  Assistant: Nain Najera CSFA; Elizabeth Gilmore CSA    Estimated Blood Loss: 5 mL    Specimens:                ID Type Source Tests Collected by Time   A : right inferior parathyroid gland. ext: [id number] Tissue Parathyroid Gland TISSUE PATHOLOGY EXAM Jeovany Castro MD 3/24/2023 1055         Drains:           Findings: 1.7 g right inferior parathyroid gland    Complications: None    Indication: Primary hyperparathyroidism    Operative Note:    The patient was seen and consent was obtained preoperatively.  Following this she was brought to the operating room and placed in supine position on the OR table.  General anesthetic was administered and the patient was orotracheally intubated without incident.  Her arms were tucked at her sides and a shoulder roll was placed to allow for moderate extension of the neck.  A briefing was performed.  The neck and upper chest were prepped and draped in normal sterile fashion.  A timeout was then performed.    Following timeout the sternal notch was marked on the skin as was the thyroid cartilage notch.  An outline for the skin incision was then made 2 fingerbreadths above the clavicular heads.  Local anesthetic was then injected.  A skin incision was then made with a scalpel and carried through the subcutaneous tissues and platysma muscle using electrocautery.  The platysma muscle was then elevated up and away from the strap muscles of the neck using electrocautery to develop this plane circumferentially around the wound all the way up to  the thyroid cartilage notch superiorly and the sternal notch inferiorly.  A wound protector was then placed to maintain retraction of the myocutaneous flaps.    The midline raphae of the neck was then identified.  The strap muscles were elevated upward and the midline of the neck was opened using electrocautery.  Given the patient's preoperative imaging I chose to explore the right side of her neck first.  The sternohyoid muscle was elevated up and away from the sternothyroid muscle using a combination of blunt dissection as well as electrocautery.  The sternothyroid muscle was then elevated up and off of the right lobe of the thyroid.  A combination of of blunt dissection with a right angle as well as sharp dissection with harmonic was then used to mobilize the muscle up and off of the right lobe of the thyroid all the way to the medial aspect of the thyroid lobe.  Once this was done directly medial and posterior to the right lobe of the thyroid a soft tissue mass was noted.  It appeared to be partially underlying the vessels going towards the inferior pole of the thyroid.  Using careful dissection technique with a right angle the tissue overlying this mass was cleared away exposing what appeared to be a parathyroid adenoma.  It was carefully dissected away from the surrounding tissue and elevated upwards into the wound.  The vascular pedicle going to the base of this was then divided using the harmonic.  Was passed off the field as frozen specimen.    While awaiting frozen specimen a repeat PTH was drawn from the jugular vein pressure was held over the area and it appeared hemostatic.  The frozen specimen was shown to be parathyroid tissue weighing 1.7 g and this was felt to be consistent with the patient's preoperative diagnosis of an adenoma on the right inferior region.    I then turned my attention to closure.  The operative field was inspected and found to be hemostatic.  5 cc of Floseal was placed in the  operative bed.  The midline of the neck was then closed using interrupted 2-0 Vicryl.  The wound protector was removed and the platysma muscle was closed using a running 3-0 Vicryl.  The skin was then closed using running 4-0 Monocryl.  Mastisol and Steri-Strips were then placed.  Patient was then awakened and returned to the recovery room.    Assistant: Nain Najera CSFA; Elizabeth Gilmore CSA was responsible for performing the following activities: Retraction, Suction and Irrigation and their skilled assistance was necessary for the success of this case.          This document has been electronically signed by Jeovany Castro MD on March 24, 2023 11:51 EDT      Jeovany Castro MD     Date: 3/24/2023  Time: 11:46 EDT

## 2023-03-24 NOTE — INTERVAL H&P NOTE
H&P updated. The patient was examined and the following changes are noted:  Has undergone thyroid US as described in most recent office note. No thyroid biopsy is needed currently. Will plan for parathyroidectomy today.  The risks and benefits have been discussed.         This document has been electronically signed by Jeovany Castro MD on March 24, 2023 08:55 EDT

## 2023-03-24 NOTE — ANESTHESIA PROCEDURE NOTES
Airway  Urgency: elective    Date/Time: 3/24/2023 9:53 AM    General Information and Staff    Patient location during procedure: OR  CRNA/CAA: Liban Peterson CRNA    Indications and Patient Condition  Indications for airway management: airway protection    Preoxygenated: yes  Mask difficulty assessment: 2 - vent by mask + OA or adjuvant +/- NMBA    Final Airway Details  Final airway type: endotracheal airway      Successful airway: ETT  Cuffed: yes   Successful intubation technique: direct laryngoscopy  Endotracheal tube insertion site: oral  Blade: Valdez  Blade size: 2  ETT size (mm): 7.0  Cormack-Lehane Classification: grade I - full view of glottis  Placement verified by: chest auscultation and capnometry   Measured from: gums  Number of attempts at approach: 1  Assessment: lips, teeth, and gum same as pre-op and atraumatic intubation

## 2023-03-24 NOTE — ANESTHESIA PREPROCEDURE EVALUATION
Anesthesia Evaluation     Patient summary reviewed and Nursing notes reviewed   NPO Solid Status: > 8 hours             Airway   Mallampati: II  TM distance: >3 FB  Neck ROM: full  No difficulty expected  Dental - normal exam     Pulmonary - negative pulmonary ROS and normal exam   (-) sleep apnea, not a smoker  Cardiovascular - normal exam    ECG reviewed    (+) hypertension,   (-) angina, [id number]      Neuro/Psych- negative ROS  GI/Hepatic/Renal/Endo    (+) morbid obesity,    (-) diabetes    Musculoskeletal     (+) myalgias,   Abdominal  - normal exam    Bowel sounds: normal.   Substance History - negative use     OB/GYN negative ob/gyn ROS         Other                        Anesthesia Plan    ASA 3     general       Anesthetic plan, risks, benefits, and alternatives have been provided, discussed and informed consent has been obtained with: patient.        CODE STATUS:

## 2023-03-24 NOTE — ANESTHESIA POSTPROCEDURE EVALUATION
Patient: Brianna Caldera    Procedure Summary     Date: 03/24/23 Room / Location: [facility] OR  / [facility] MAIN OR    Anesthesia Start: 0943 Anesthesia Stop: 1140    Procedure: PARATHYROIDECTOMY (Neck) Diagnosis:       Primary hyperparathyroidism (HCC)      (Primary hyperparathyroidism (HCC) [E21.0])    Surgeons: Jeovany Castro MD Provider: Jeovany Quan MD    Anesthesia Type: general ASA Status: 3          Anesthesia Type: general    Vitals  Vitals Value Taken Time   /50 03/24/23 1158   Temp 97.4 °F (36.3 °C) 03/24/23 1140   Pulse 78 03/24/23 1201   Resp 17 03/24/23 1155   SpO2 96 % 03/24/23 1201   Vitals shown include unvalidated device data.        Post Anesthesia Care and Evaluation    Patient location during evaluation: PACU  Patient participation: complete - patient participated  Level of consciousness: awake  Pain score: 0  Pain management: adequate  Anesthetic complications: No anesthetic complications  PONV Status: none  Cardiovascular status: acceptable  Respiratory status: acceptable  Hydration status: acceptable

## 2023-03-27 ENCOUNTER — TELEPHONE (OUTPATIENT)
Dept: SURGERY | Facility: CLINIC | Age: 68
End: 2023-03-27
Payer: COMMERCIAL

## 2023-03-27 DIAGNOSIS — I10 PRIMARY HYPERTENSION: ICD-10-CM

## 2023-03-27 LAB
LAB AP CASE REPORT: NORMAL
Lab: NORMAL
PATH REPORT.FINAL DX SPEC: NORMAL
PATH REPORT.GROSS SPEC: NORMAL

## 2023-03-27 RX ORDER — RAMIPRIL 10 MG/1
20 CAPSULE ORAL DAILY
Qty: 180 CAPSULE | Refills: 1 | Status: SHIPPED | OUTPATIENT
Start: 2023-03-27

## 2023-03-27 RX ORDER — FUROSEMIDE 20 MG/1
20 TABLET ORAL DAILY PRN
Qty: 90 TABLET | Refills: 0 | Status: SHIPPED | OUTPATIENT
Start: 2023-03-27

## 2023-03-27 RX ORDER — MONTELUKAST SODIUM 10 MG/1
TABLET ORAL
Qty: 90 TABLET | Refills: 1 | Status: SHIPPED | OUTPATIENT
Start: 2023-03-27

## 2023-03-27 NOTE — TELEPHONE ENCOUNTER
PO FU Call- spoke with pt. Already had 2 wk po appt. Will fu then. Knows to call with any questions or concerns.      Pt also left a message with Benny over weekend about pain meds. [facility]. Called pt and stated doesn't need them feeling fine just some soreness and swelling.

## 2023-04-10 ENCOUNTER — PATIENT MESSAGE (OUTPATIENT)
Dept: SURGERY | Facility: CLINIC | Age: 68
End: 2023-04-10

## 2023-05-03 ENCOUNTER — OFFICE VISIT (OUTPATIENT)
Dept: SURGERY | Facility: CLINIC | Age: 68
End: 2023-05-03
Payer: MEDICARE

## 2023-05-03 VITALS
TEMPERATURE: 98.4 F | SYSTOLIC BLOOD PRESSURE: 112 MMHG | HEART RATE: 72 BPM | DIASTOLIC BLOOD PRESSURE: 79 MMHG | BODY MASS INDEX: 39.71 KG/M2 | HEIGHT: 64 IN | OXYGEN SATURATION: 96 % | WEIGHT: 232.6 LBS

## 2023-05-03 DIAGNOSIS — Z09 ENCOUNTER FOR FOLLOW-UP: Primary | ICD-10-CM

## 2023-05-03 NOTE — PROGRESS NOTES
CHIEF COMPLAINT:    Chief Complaint   Patient presents with   • Post-op     Parathyroidectomy 3/24/23       HISTORY OF PRESENT ILLNESS:    Brianna Caldera is a 67 y.o. female who underwent parathyroidectomy on 3/24/2023 for primary hyperparathyroidism.  Overall she has done well postoperatively.  She did have some redness and swelling at her incision early on which has now resolved.  She notes no pain at the site.  She notes that her preoperative fatigue and aches and pains have essentially gone away since surgery.  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    EXAM:  Vitals:    05/03/23 1356   BP: 112/79   Pulse: 72   Temp: 98.4 °F (36.9 °C)   SpO2: 96%         Healing transverse lower neck incision    ASSESSMENT:    Status post parathyroidectomy    PLAN:    Overall appears to be healing well.  From a symptom standpoint these all have seemingly resolved post parathyroidectomy.  Continue follow-up with PCP and endocrinology.  See us as needed.          This document has been electronically signed by Jeovany Castro MD on May 3, 2023 14:15 EDT

## 2023-07-17 ENCOUNTER — TELEPHONE (OUTPATIENT)
Dept: ORTHOPEDIC SURGERY | Facility: CLINIC | Age: 68
End: 2023-07-17

## 2023-07-17 NOTE — TELEPHONE ENCOUNTER
Provider: BETSY  Caller: SAHIL CASE  Relationship to Patient: PATIENT  Pharmacy: NA  Phone Number: [phone]  Reason for Call: PATIENT WOULD LIKE TO R/S UPCOMING APPT AND SEE DR TRUJILLO FOR HER RT HIP PAIN POST FALL; PATIENT IS EST W/DR TRUJILLO AND NEEDS APPROVAL FOR THIS R/S DUE TO PATIENT AGE  When was the patient last seen: 6.28.23

## 2023-08-18 RX ORDER — FUROSEMIDE 20 MG/1
20 TABLET ORAL DAILY PRN
Qty: 90 TABLET | Refills: 0 | Status: SHIPPED | OUTPATIENT
Start: 2023-08-18

## 2023-09-01 ENCOUNTER — OFFICE VISIT (OUTPATIENT)
Dept: ORTHOPEDIC SURGERY | Facility: CLINIC | Age: 68
End: 2023-09-01
Payer: MEDICARE

## 2023-09-01 VITALS — WEIGHT: 229 LBS | HEIGHT: 64 IN | HEART RATE: 75 BPM | BODY MASS INDEX: 39.09 KG/M2 | OXYGEN SATURATION: 100 %

## 2023-09-01 DIAGNOSIS — M75.51 SUBACROMIAL BURSITIS OF RIGHT SHOULDER JOINT: Primary | ICD-10-CM

## 2023-09-01 PROCEDURE — 99213 OFFICE O/P EST LOW 20 MIN: CPT | Performed by: FAMILY MEDICINE

## 2023-09-01 NOTE — PROGRESS NOTES
Primary Care Sports Medicine Office Visit Note     Patient ID: Brianna Caldera is a 67 y.o. female.    Chief Complaint:  Chief Complaint   Patient presents with    Right Shoulder - Pain, Follow-up     HPI:    Ms. Brianna Caldera is a 67 y.o. female. The patient presents to the clinic today for follow-up evaluation of right shoulder.    The patient reports that the injection she received in 2023 helped for about a month. For the last couple of weeks, the pain has returned. The first injection lasted over a year, and she knows it is too soon for another injection. The patient is still doing physical therapy, resistance bands, and some weights. She is doing as much as she can.    Past Medical History:   Diagnosis Date    Anesthesia complication     face itches    Ankle edema, bilateral     Cataract     bilateral    Cholelithiasis     Colon polyp     Constipation     Diabetes mellitus 10/15/2022    A1C now normal    GERD (gastroesophageal reflux disease)     Impression: Continue OTC medications.    Glucose intolerance (impaired glucose tolerance)     Impression: With very mild elevation in triglycerides. Improved with diet and weight loss. Continue dietary modifications discussed. No medications needed at this time. Follow-up 6 mo.    Headache     Lesion of lung     was told has spots on lungs, and was biopsied (looked like cancer), but was benign    Morbid obesity     >40    Osteopenia     per DEXA     Other hyperlipidemia     Impression: I recommended dietary modifications. Try Mediterranean diet. Follow-up 6 mo with fasting labs prior to visit.    Overweight     >25    Peripheral edema     occasonally    Prediabetes     diet controlled.  No meds.      Seasonal allergic rhinitis        Past Surgical History:   Procedure Laterality Date    ADENOIDECTOMY      CATARACT EXTRACTION, BILATERAL       SECTION      two    COLONOSCOPY      polyps removed    COLONOSCOPY  10/27/2021    diverticulosis; 1  polyp; repeat in 7 years (2028)    ESOPHAGOSCOPY / EGD  10/27/2021    hiatal hernia, dilation    EXPLORATORY LAPAROTOMY  2017    for small bowel obstruction/intussusception    EYE SURGERY  3/2021    Cataract    FRACTURE SURGERY  1989, 2015    INCISIONAL HERNIA REPAIR Bilateral 12/02/20109    LAPAROSCOPIC CHOLECYSTECTOMY  2001    LUNG BIOPSY  2003    PARATHYROIDECTOMY  03/24/2023    PARATHYROIDECTOMY N/A 3/24/2023    Procedure: PARATHYROIDECTOMY;  Surgeon: Jeovany Castro MD;  Location: [facility] MAIN OR;  Service: General;  Laterality: N/A;    TONSILLECTOMY  1967    TONSILLECTOMY      TOTAL ABDOMINAL HYSTERECTOMY WITH SALPINGO OOPHORECTOMY  2003    VENTRAL/INCISIONAL HERNIA REPAIR N/A 12/02/2019    Procedure: OPEN INCISIONAL HERNIA REPAIR BILATERAL COMPONENT RELEASE WITH MESH.;  Surgeon: Tomas Alamo DO;  Location: [facility] MAIN OR;  Service: General    WRIST FRACTURE SURGERY      s/p pin placement in 1990 and repair (bone shortening) in 2016 - [facility]       Family History   Problem Relation Age of Onset    Colon cancer Mother     Cancer Mother         Colon Cancer    Kidney cancer Father     Cancer Father         Kidney Cancer    Brain cancer Brother     Cancer Brother         Brain tumors    Breast cancer Maternal Grandmother     Diabetes Maternal Grandmother     Cancer Maternal Grandmother         Breast Cancer    Cancer Maternal Grandfather         Bladder cancer    Ovarian cancer Neg Hx      Social History     Occupational History     Employer: [employer]   Tobacco Use    Smoking status: Never     Passive exposure: Never    Smokeless tobacco: Never   Vaping Use    Vaping Use: Never used   Substance and Sexual Activity    Alcohol use: No    Drug use: No    Sexual activity: Defer      Review of Systems   Constitutional:  Negative for activity change and fever.   Musculoskeletal:  Positive for arthralgias.   Skin:  Negative for color change and rash.   Neurological:   "Negative for weakness.   Objective:    Pulse 75   Ht 162.6 cm (64\")   Wt 104 kg (229 lb)   SpO2 100%   BMI 39.31 kg/mý     Physical Examination:  Physical Exam  Vitals and nursing note reviewed.   Constitutional:       General: She is not in acute distress.     Appearance: She is well-developed. She is not diaphoretic.   HENT:      Head: Normocephalic and atraumatic.   Eyes:      Conjunctiva/sclera: Conjunctivae normal.   Pulmonary:      Effort: Pulmonary effort is normal. No respiratory distress.   Skin:     General: Skin is warm.      Capillary Refill: Capillary refill takes less than 2 seconds.   Neurological:      Mental Status: She is alert.     Right Shoulder Exam     Range of Motion   The patient has normal right shoulder ROM.  Right shoulder forward flexion: relatively full range of motion with mild pain from 120 degrees to 160 degrees laterally and in lateral abduction and to forward flexion as well.     Comments:  Mary Beth is positive for pain and mild weakness.        Imaging and other tests:  Three-view XR of the right shoulder today reveals moderate joint space narrowing consistent with osteoarthritic disease of the glenohumeral and acromioclavicular joints both.  There is decrease in rotator cuff interval.  No acute findings.    Assessment and Plan:    1. Subacromial bursitis of right shoulder joint  - XR Shoulder 2+ View Right    I discussed with the patient that unfortunately with premature corticosteroid injection failure, I would like for her to start a topical compounded pain cream. She can use this for the next 1 month until at which time we could consider repeat injection. Otherwise, continue home stretching and strengthening program. Strengthening activities were demonstrated today to use resistance bands. Return to clinic in 1 month for follow-up evaluation.    Transcribed from ambient dictation for Jeevan Hearn II, DO by Dia Machuca.  09/01/23   09:50 EDT    Patient or patient " representative verbalized consent to the visit recording.  I have personally performed the services described in this document as transcribed by the above individual, and it is both accurate and complete.    Disclaimer: Please note that areas of this note were completed with computer voice recognition software.  Quite often unanticipated grammatical, syntax, homophones, and other interpretive errors are inadvertently transcribed by the computer software. Please excuse any errors that have escaped final proofreading.

## 2023-11-10 DIAGNOSIS — I10 PRIMARY HYPERTENSION: ICD-10-CM

## 2023-11-11 RX ORDER — RAMIPRIL 10 MG/1
20 CAPSULE ORAL DAILY
Qty: 180 CAPSULE | Refills: 0 | Status: SHIPPED | OUTPATIENT
Start: 2023-11-11 | End: 2023-11-13 | Stop reason: SDUPTHER

## 2023-11-13 DIAGNOSIS — I10 PRIMARY HYPERTENSION: ICD-10-CM

## 2023-11-13 RX ORDER — MONTELUKAST SODIUM 10 MG/1
TABLET ORAL
Qty: 90 TABLET | Refills: 0 | Status: SHIPPED | OUTPATIENT
Start: 2023-11-13

## 2023-11-13 RX ORDER — FUROSEMIDE 20 MG/1
20 TABLET ORAL DAILY PRN
Qty: 90 TABLET | Refills: 0 | Status: SHIPPED | OUTPATIENT
Start: 2023-11-13

## 2023-11-13 RX ORDER — RAMIPRIL 10 MG/1
20 CAPSULE ORAL DAILY
Qty: 180 CAPSULE | Refills: 0 | Status: SHIPPED | OUTPATIENT
Start: 2023-11-13

## 2024-02-08 RX ORDER — MONTELUKAST SODIUM 10 MG/1
TABLET ORAL
Qty: 90 TABLET | Refills: 0 | OUTPATIENT
Start: 2024-02-08

## 2024-02-09 RX ORDER — FUROSEMIDE 20 MG/1
20 TABLET ORAL DAILY PRN
Qty: 90 TABLET | Refills: 0 | OUTPATIENT
Start: 2024-02-09

## 2024-05-02 DIAGNOSIS — I10 PRIMARY HYPERTENSION: ICD-10-CM

## 2024-05-02 RX ORDER — RAMIPRIL 10 MG/1
20 CAPSULE ORAL DAILY
Qty: 180 CAPSULE | Refills: 0 | OUTPATIENT
Start: 2024-05-02

## 2024-08-26 RX ORDER — FUROSEMIDE 20 MG
20 TABLET ORAL DAILY PRN
Qty: 90 TABLET | Refills: 0 | OUTPATIENT
Start: 2024-08-26

## (undated) DEVICE — BLAKE SILICONE DRAIN, 19 FR ROUND, HUBLESS WITH 1/4" BENDABLE TROCAR: Brand: BLAKE

## (undated) DEVICE — SHEET, DRAPE, SPLIT, STERILE: Brand: MEDLINE

## (undated) DEVICE — NDL HYPO PRECISIONGLIDE REG 22G 1 1/2

## (undated) DEVICE — DRAPE SHEET ULTRAGARD: Brand: MEDLINE

## (undated) DEVICE — CABL BIPOL CAUTRY 12FT 1P/U STRL

## (undated) DEVICE — CVR HNDL LT SURG ACCSSRY BLU STRL

## (undated) DEVICE — BINDER: Brand: DEROYAL

## (undated) DEVICE — SUT MONOCRYL 4/0 PS2 27IN Y426H ETY426H

## (undated) DEVICE — ANTIBACTERIAL UNDYED BRAIDED (POLYGLACTIN 910), SYNTHETIC ABSORBABLE SUTURE: Brand: COATED VICRYL

## (undated) DEVICE — SUT VIC COAT 3/0 WO/NDL 18IN

## (undated) DEVICE — PK PROC TURNOVER

## (undated) DEVICE — GLV SURG BIOGEL SENSR LTX PF SZ7.5

## (undated) DEVICE — DRSNG WND BORDR/ADHS NONADHR/GZ LF 4X4IN STRL

## (undated) DEVICE — DRN WND EVAC BULB 100CC

## (undated) DEVICE — 3M™ STERI-STRIP™ REINFORCED ADHESIVE SKIN CLOSURES, R1547, 1/2 IN X 4 IN (12 MM X 100 MM), 6 STRIPS/ENVELOPE: Brand: 3M™ STERI-STRIP™

## (undated) DEVICE — DRAPE,UTILITY,XL,4/PK,STERILE: Brand: MEDLINE

## (undated) DEVICE — UNDERGLV SURG BIOGEL INDICAT PF 8 GRN

## (undated) DEVICE — SOL IRR NACL 0.9PCT 1000ML

## (undated) DEVICE — SUT PROLN 1 CTX 30IN 8455H

## (undated) DEVICE — CUFF SCD HEMOFORCE SEQ CALF STD MD

## (undated) DEVICE — SUT VIC 4/0 SH 27IN J415H

## (undated) DEVICE — SUT ETHLN 2/0 PS 18IN 585H

## (undated) DEVICE — SOL IRRIG H2O 1000ML STRL

## (undated) DEVICE — SUT SILK 3/0 TIES 18IN A184H

## (undated) DEVICE — ADHS SKIN PREMIERPRO EXOFIN TOPICAL HI/VISC .5ML

## (undated) DEVICE — PK MAJ LAPAROTOMY 50

## (undated) DEVICE — 450 ML BOTTLE OF 0.05% CHLORHEXIDINE GLUCONATE IN 99.95% STERILE WATER FOR IRRIGATION, USP AND APPLICATOR.: Brand: IRRISEPT ANTIMICROBIAL WOUND LAVAGE

## (undated) DEVICE — DECANTER: Brand: UNBRANDED

## (undated) DEVICE — BLAKE SILICONE DRAIN, 15 FR ROUND, HUBLESS: Brand: BLAKE

## (undated) DEVICE — HARMONIC FOCUS SHEARS 9CM LENGTH + ADAPTIVE TISSUE TECHNOLOGY FOR USE WITH BLUE HAND PIECE ONLY: Brand: HARMONIC FOCUS

## (undated) DEVICE — SUT VIC 2/0 SH 27IN

## (undated) DEVICE — SUT PERMAHAND SILK 3/0 SH1 18IN

## (undated) DEVICE — WOUND RETRACTOR AND PROTECTOR: Brand: ALEXIS WOUND PROTECTOR-RETRACTOR

## (undated) DEVICE — GLV SURG BIOGEL LTX PF 7 1/2

## (undated) DEVICE — SUT VIC 2/0 CT1 27IN J259H

## (undated) DEVICE — DRP SLUSH MACH OM-ORS-320

## (undated) DEVICE — REFLEX ONE, SKIN STAPLER, 35 WIDE: Brand: REFLEX

## (undated) DEVICE — TBG PENCL TELESCP MEGADYNE SMOKE EVAC 15FT

## (undated) DEVICE — CONTAINER,SPECIMEN,OR STERILE,4OZ: Brand: MEDLINE

## (undated) DEVICE — Device

## (undated) DEVICE — SUT PROLN 1 CT1 30IN 8425H

## (undated) DEVICE — SPNG LAP PREWSH SFTPK 18X18IN STRL PK/5

## (undated) DEVICE — KT SURG TURNOVER 050

## (undated) DEVICE — SUT PROLN 2/0 SH 36IN 8523H

## (undated) DEVICE — ADHS LIQ MASTISOL 2/3ML

## (undated) DEVICE — DRSNG WND BORDR/ADHS NONADHR/GZ LF 4X10IN STRL

## (undated) DEVICE — PK MINOR LAPAROTOMY 50